# Patient Record
Sex: MALE | Race: WHITE | Employment: UNEMPLOYED | URBAN - METROPOLITAN AREA
[De-identification: names, ages, dates, MRNs, and addresses within clinical notes are randomized per-mention and may not be internally consistent; named-entity substitution may affect disease eponyms.]

---

## 2018-06-16 ENCOUNTER — APPOINTMENT (OUTPATIENT)
Dept: GENERAL RADIOLOGY | Age: 48
DRG: 055 | End: 2018-06-16
Payer: COMMERCIAL

## 2018-06-16 ENCOUNTER — APPOINTMENT (OUTPATIENT)
Dept: CT IMAGING | Age: 48
DRG: 055 | End: 2018-06-16
Payer: COMMERCIAL

## 2018-06-16 ENCOUNTER — HOSPITAL ENCOUNTER (INPATIENT)
Age: 48
LOS: 24 days | Discharge: HOME HEALTH | DRG: 055 | End: 2018-07-10
Attending: EMERGENCY MEDICINE | Admitting: SURGERY
Payer: COMMERCIAL

## 2018-06-16 DIAGNOSIS — T14.90XA TRAUMA: Primary | ICD-10-CM

## 2018-06-16 DIAGNOSIS — I60.9 SUBARACHNOID HEMORRHAGE (HCC): ICD-10-CM

## 2018-06-16 PROBLEM — S02.19XA CLOSED FRACTURE OF TEMPORAL BONE (HCC): Status: ACTIVE | Noted: 2018-06-16

## 2018-06-16 LAB
% INHIBITION AA: 0 %
% INHIBITION ADP: 9.1 %
ABO/RH: NORMAL
ACETAMINOPHEN LEVEL: <5 MCG/ML (ref 10–30)
ALBUMIN SERPL-MCNC: 3.9 G/DL (ref 3.5–5.2)
ALBUMIN SERPL-MCNC: 4.1 G/DL (ref 3.5–5.2)
ALP BLD-CCNC: 100 U/L (ref 40–129)
ALP BLD-CCNC: 107 U/L (ref 40–129)
ALT SERPL-CCNC: 26 U/L (ref 0–40)
ALT SERPL-CCNC: 28 U/L (ref 0–40)
AMPHETAMINE SCREEN, URINE: NOT DETECTED
ANGLE (CLOT STRENGTH): 72.8 DEGREE (ref 59–74)
ANION GAP SERPL CALCULATED.3IONS-SCNC: 13 MMOL/L (ref 7–16)
ANION GAP SERPL CALCULATED.3IONS-SCNC: 18 MMOL/L (ref 7–16)
ANTIBODY SCREEN: NORMAL
APTT: 27.2 SEC (ref 24.5–35.1)
AST SERPL-CCNC: 20 U/L (ref 0–39)
AST SERPL-CCNC: 23 U/L (ref 0–39)
B.E.: -1 MMOL/L (ref -3–3)
BARBITURATE SCREEN URINE: NOT DETECTED
BENZODIAZEPINE SCREEN, URINE: NOT DETECTED
BILIRUB SERPL-MCNC: 0.3 MG/DL (ref 0–1.2)
BILIRUB SERPL-MCNC: 0.4 MG/DL (ref 0–1.2)
BUN BLDV-MCNC: 24 MG/DL (ref 6–20)
BUN BLDV-MCNC: 25 MG/DL (ref 6–20)
CALCIUM IONIZED: 1.17 MMOL/L (ref 1.15–1.33)
CALCIUM SERPL-MCNC: 8.8 MG/DL (ref 8.6–10.2)
CALCIUM SERPL-MCNC: 8.9 MG/DL (ref 8.6–10.2)
CANNABINOID SCREEN URINE: POSITIVE
CHLORIDE BLD-SCNC: 93 MMOL/L (ref 98–107)
CHLORIDE BLD-SCNC: 95 MMOL/L (ref 98–107)
CO2: 25 MMOL/L (ref 22–29)
CO2: 28 MMOL/L (ref 22–29)
COCAINE METABOLITE SCREEN URINE: NOT DETECTED
COHB: 1.3 % (ref 0–1.5)
CREAT SERPL-MCNC: 1.2 MG/DL (ref 0.7–1.2)
CREAT SERPL-MCNC: 1.3 MG/DL (ref 0.7–1.2)
CRITICAL: ABNORMAL
DATE ANALYZED: ABNORMAL
DATE OF COLLECTION: ABNORMAL
ETHANOL: <10 MG/DL (ref 0–0.08)
G-TEG: 9.4 K D/SC (ref 4.5–11)
GFR AFRICAN AMERICAN: >60
GFR AFRICAN AMERICAN: >60
GFR NON-AFRICAN AMERICAN: 59 ML/MIN/1.73
GFR NON-AFRICAN AMERICAN: >60 ML/MIN/1.73
GLUCOSE BLD-MCNC: 254 MG/DL (ref 74–109)
GLUCOSE BLD-MCNC: 311 MG/DL (ref 74–109)
HCO3: 23.3 MMOL/L (ref 22–26)
HCT VFR BLD CALC: 41.3 % (ref 37–54)
HCT VFR BLD CALC: 42.4 % (ref 37–54)
HEMOGLOBIN: 14.1 G/DL (ref 12.5–16.5)
HEMOGLOBIN: 14.6 G/DL (ref 12.5–16.5)
HHB: 0.5 % (ref 0–5)
INR BLD: 1.1
K (CLOTTING TIME): 1.1 MIN (ref 1–3)
LAB: ABNORMAL
LACTIC ACID: 4.7 MMOL/L (ref 0.5–2.2)
Lab: 132
MA (MAX AMPLITUDE): 65.4 MM (ref 50–70)
MA-AA: 67.4 MM
MA-ACTIVATED: 14 MM
MA-ADP: 60.7 MM
MA-TEG BASELINE: 65.4 MM
MAGNESIUM: 1.2 MG/DL (ref 1.6–2.6)
MCH RBC QN AUTO: 27.6 PG (ref 26–35)
MCH RBC QN AUTO: 28.1 PG (ref 26–35)
MCHC RBC AUTO-ENTMCNC: 34.1 % (ref 32–34.5)
MCHC RBC AUTO-ENTMCNC: 34.4 % (ref 32–34.5)
MCV RBC AUTO: 80.8 FL (ref 80–99.9)
MCV RBC AUTO: 81.5 FL (ref 80–99.9)
METER GLUCOSE: 155 MG/DL (ref 70–110)
METER GLUCOSE: 224 MG/DL (ref 70–110)
METER GLUCOSE: 234 MG/DL (ref 70–110)
METER GLUCOSE: 85 MG/DL (ref 70–110)
METHADONE SCREEN, URINE: NOT DETECTED
METHB: 0.4 % (ref 0–1.5)
MODE: ABNORMAL
O2 CONTENT: 21.5 ML/DL
O2 SATURATION: 99.5 % (ref 92–98.5)
O2HB: 97.8 % (ref 94–97)
OPERATOR ID: ABNORMAL
OPIATE SCREEN URINE: NOT DETECTED
PATIENT TEMP: 37 C
PCO2: 38.1 MMHG (ref 35–45)
PDW BLD-RTO: 13.1 FL (ref 11.5–15)
PDW BLD-RTO: 13.2 FL (ref 11.5–15)
PH BLOOD GAS: 7.41 (ref 7.35–7.45)
PHENCYCLIDINE SCREEN URINE: NOT DETECTED
PHOSPHORUS: 2.8 MG/DL (ref 2.5–4.5)
PLATELET # BLD: 207 E9/L (ref 130–450)
PLATELET # BLD: 239 E9/L (ref 130–450)
PMV BLD AUTO: 9.5 FL (ref 7–12)
PMV BLD AUTO: 9.6 FL (ref 7–12)
PO2: 348.4 MMHG (ref 60–100)
POTASSIUM REFLEX MAGNESIUM: 3.6 MMOL/L (ref 3.5–5)
POTASSIUM SERPL-SCNC: 3.3 MMOL/L (ref 3.3–5.1)
POTASSIUM SERPL-SCNC: 3.6 MMOL/L (ref 3.5–5)
PROPOXYPHENE SCREEN: NOT DETECTED
PROTHROMBIN TIME: 12.6 SEC (ref 9.3–12.4)
R (REACTION TIME): 3.4 MIN (ref 5–10)
RBC # BLD: 5.11 E12/L (ref 3.8–5.8)
RBC # BLD: 5.2 E12/L (ref 3.8–5.8)
SALICYLATE, SERUM: <0.3 MG/DL (ref 0–30)
SODIUM BLD-SCNC: 136 MMOL/L (ref 132–146)
SODIUM BLD-SCNC: 136 MMOL/L (ref 132–146)
SOURCE, BLOOD GAS: ABNORMAL
THB: 15 G/DL (ref 11.5–16.5)
TIME ANALYZED: 135
TOTAL PROTEIN: 7.3 G/DL (ref 6.4–8.3)
TOTAL PROTEIN: 7.4 G/DL (ref 6.4–8.3)
TRICYCLIC ANTIDEPRESSANTS SCREEN SERUM: NEGATIVE NG/ML
WBC # BLD: 16 E9/L (ref 4.5–11.5)
WBC # BLD: 9.1 E9/L (ref 4.5–11.5)

## 2018-06-16 PROCEDURE — 87081 CULTURE SCREEN ONLY: CPT

## 2018-06-16 PROCEDURE — 2000000000 HC ICU R&B

## 2018-06-16 PROCEDURE — 94002 VENT MGMT INPAT INIT DAY: CPT

## 2018-06-16 PROCEDURE — 82962 GLUCOSE BLOOD TEST: CPT

## 2018-06-16 PROCEDURE — 2580000003 HC RX 258: Performed by: STUDENT IN AN ORGANIZED HEALTH CARE EDUCATION/TRAINING PROGRAM

## 2018-06-16 PROCEDURE — 99285 EMERGENCY DEPT VISIT HI MDM: CPT

## 2018-06-16 PROCEDURE — 83605 ASSAY OF LACTIC ACID: CPT

## 2018-06-16 PROCEDURE — 85384 FIBRINOGEN ACTIVITY: CPT

## 2018-06-16 PROCEDURE — 5A1955Z RESPIRATORY VENTILATION, GREATER THAN 96 CONSECUTIVE HOURS: ICD-10-PCS | Performed by: SURGERY

## 2018-06-16 PROCEDURE — 36415 COLL VENOUS BLD VENIPUNCTURE: CPT

## 2018-06-16 PROCEDURE — 71045 X-RAY EXAM CHEST 1 VIEW: CPT

## 2018-06-16 PROCEDURE — G0480 DRUG TEST DEF 1-7 CLASSES: HCPCS

## 2018-06-16 PROCEDURE — G0390 TRAUMA RESPONS W/HOSP CRITI: HCPCS

## 2018-06-16 PROCEDURE — 85610 PROTHROMBIN TIME: CPT

## 2018-06-16 PROCEDURE — 86900 BLOOD TYPING SEROLOGIC ABO: CPT

## 2018-06-16 PROCEDURE — 2500000003 HC RX 250 WO HCPCS: Performed by: STUDENT IN AN ORGANIZED HEALTH CARE EDUCATION/TRAINING PROGRAM

## 2018-06-16 PROCEDURE — 51702 INSERT TEMP BLADDER CATH: CPT

## 2018-06-16 PROCEDURE — 84100 ASSAY OF PHOSPHORUS: CPT

## 2018-06-16 PROCEDURE — 6360000002 HC RX W HCPCS

## 2018-06-16 PROCEDURE — 80307 DRUG TEST PRSMV CHEM ANLYZR: CPT

## 2018-06-16 PROCEDURE — 6370000000 HC RX 637 (ALT 250 FOR IP): Performed by: STUDENT IN AN ORGANIZED HEALTH CARE EDUCATION/TRAINING PROGRAM

## 2018-06-16 PROCEDURE — 85730 THROMBOPLASTIN TIME PARTIAL: CPT

## 2018-06-16 PROCEDURE — 70450 CT HEAD/BRAIN W/O DYE: CPT

## 2018-06-16 PROCEDURE — 82330 ASSAY OF CALCIUM: CPT

## 2018-06-16 PROCEDURE — 36556 INSERT NON-TUNNEL CV CATH: CPT | Performed by: NURSE PRACTITIONER

## 2018-06-16 PROCEDURE — 83735 ASSAY OF MAGNESIUM: CPT

## 2018-06-16 PROCEDURE — 72125 CT NECK SPINE W/O DYE: CPT

## 2018-06-16 PROCEDURE — 2500000003 HC RX 250 WO HCPCS

## 2018-06-16 PROCEDURE — C9113 INJ PANTOPRAZOLE SODIUM, VIA: HCPCS | Performed by: STUDENT IN AN ORGANIZED HEALTH CARE EDUCATION/TRAINING PROGRAM

## 2018-06-16 PROCEDURE — 86901 BLOOD TYPING SEROLOGIC RH(D): CPT

## 2018-06-16 PROCEDURE — 99291 CRITICAL CARE FIRST HOUR: CPT | Performed by: SURGERY

## 2018-06-16 PROCEDURE — 0BH17EZ INSERTION OF ENDOTRACHEAL AIRWAY INTO TRACHEA, VIA NATURAL OR ARTIFICIAL OPENING: ICD-10-PCS | Performed by: SURGERY

## 2018-06-16 PROCEDURE — 31500 INSERT EMERGENCY AIRWAY: CPT | Performed by: SURGERY

## 2018-06-16 PROCEDURE — 02HV33Z INSERTION OF INFUSION DEVICE INTO SUPERIOR VENA CAVA, PERCUTANEOUS APPROACH: ICD-10-PCS | Performed by: SURGERY

## 2018-06-16 PROCEDURE — 85027 COMPLETE CBC AUTOMATED: CPT

## 2018-06-16 PROCEDURE — 84132 ASSAY OF SERUM POTASSIUM: CPT

## 2018-06-16 PROCEDURE — 6360000002 HC RX W HCPCS: Performed by: STUDENT IN AN ORGANIZED HEALTH CARE EDUCATION/TRAINING PROGRAM

## 2018-06-16 PROCEDURE — 96374 THER/PROPH/DIAG INJ IV PUSH: CPT

## 2018-06-16 PROCEDURE — 82805 BLOOD GASES W/O2 SATURATION: CPT

## 2018-06-16 PROCEDURE — 85576 BLOOD PLATELET AGGREGATION: CPT

## 2018-06-16 PROCEDURE — 86850 RBC ANTIBODY SCREEN: CPT

## 2018-06-16 PROCEDURE — 72170 X-RAY EXAM OF PELVIS: CPT

## 2018-06-16 PROCEDURE — 80053 COMPREHEN METABOLIC PANEL: CPT

## 2018-06-16 PROCEDURE — 85347 COAGULATION TIME ACTIVATED: CPT

## 2018-06-16 RX ORDER — PANTOPRAZOLE SODIUM 40 MG/10ML
40 INJECTION, POWDER, LYOPHILIZED, FOR SOLUTION INTRAVENOUS DAILY
Status: DISCONTINUED | OUTPATIENT
Start: 2018-06-16 | End: 2018-06-17

## 2018-06-16 RX ORDER — POTASSIUM CHLORIDE 20 MEQ/1
40 TABLET, EXTENDED RELEASE ORAL ONCE
Status: DISCONTINUED | OUTPATIENT
Start: 2018-06-16 | End: 2018-06-16

## 2018-06-16 RX ORDER — SODIUM CHLORIDE 0.9 % (FLUSH) 0.9 %
10 SYRINGE (ML) INJECTION PRN
Status: DISCONTINUED | OUTPATIENT
Start: 2018-06-16 | End: 2018-06-23 | Stop reason: SDUPTHER

## 2018-06-16 RX ORDER — PROMETHAZINE HYDROCHLORIDE 25 MG/ML
6.25 INJECTION, SOLUTION INTRAMUSCULAR; INTRAVENOUS EVERY 6 HOURS PRN
Status: DISCONTINUED | OUTPATIENT
Start: 2018-06-16 | End: 2018-07-10 | Stop reason: HOSPADM

## 2018-06-16 RX ORDER — LEVETIRACETAM 5 MG/ML
500 INJECTION INTRAVASCULAR EVERY 12 HOURS
Status: DISCONTINUED | OUTPATIENT
Start: 2018-06-16 | End: 2018-06-17

## 2018-06-16 RX ORDER — MINERAL OIL AND WHITE PETROLATUM 150; 830 MG/G; MG/G
OINTMENT OPHTHALMIC
Status: DISCONTINUED | OUTPATIENT
Start: 2018-06-16 | End: 2018-07-06

## 2018-06-16 RX ORDER — NICOTINE POLACRILEX 4 MG
15 LOZENGE BUCCAL PRN
Status: DISCONTINUED | OUTPATIENT
Start: 2018-06-16 | End: 2018-07-10 | Stop reason: HOSPADM

## 2018-06-16 RX ORDER — ONDANSETRON 2 MG/ML
4 INJECTION INTRAMUSCULAR; INTRAVENOUS EVERY 6 HOURS PRN
Status: DISCONTINUED | OUTPATIENT
Start: 2018-06-16 | End: 2018-07-10 | Stop reason: HOSPADM

## 2018-06-16 RX ORDER — OXYCODONE HCL 5 MG/5 ML
5 SOLUTION, ORAL ORAL EVERY 4 HOURS
Status: DISCONTINUED | OUTPATIENT
Start: 2018-06-16 | End: 2018-06-17

## 2018-06-16 RX ORDER — LISINOPRIL 2.5 MG/1
2.5 TABLET ORAL DAILY
COMMUNITY

## 2018-06-16 RX ORDER — ACETAMINOPHEN 325 MG/1
650 TABLET ORAL ONCE
Status: DISCONTINUED | OUTPATIENT
Start: 2018-06-16 | End: 2018-06-16

## 2018-06-16 RX ORDER — PROMETHAZINE HYDROCHLORIDE 25 MG/ML
INJECTION, SOLUTION INTRAMUSCULAR; INTRAVENOUS
Status: COMPLETED
Start: 2018-06-16 | End: 2018-06-16

## 2018-06-16 RX ORDER — HALOPERIDOL 5 MG/ML
INJECTION INTRAMUSCULAR
Status: COMPLETED
Start: 2018-06-16 | End: 2018-06-16

## 2018-06-16 RX ORDER — ACETAMINOPHEN 325 MG/1
650 TABLET ORAL EVERY 4 HOURS PRN
Status: DISCONTINUED | OUTPATIENT
Start: 2018-06-16 | End: 2018-07-05

## 2018-06-16 RX ORDER — VECURONIUM BROMIDE 1 MG/ML
INJECTION, POWDER, LYOPHILIZED, FOR SOLUTION INTRAVENOUS
Status: DISCONTINUED
Start: 2018-06-16 | End: 2018-06-16 | Stop reason: WASHOUT

## 2018-06-16 RX ORDER — THIAMINE HYDROCHLORIDE 100 MG/ML
100 INJECTION, SOLUTION INTRAMUSCULAR; INTRAVENOUS DAILY
Status: DISCONTINUED | OUTPATIENT
Start: 2018-06-16 | End: 2018-06-17

## 2018-06-16 RX ORDER — BISACODYL 10 MG
10 SUPPOSITORY, RECTAL RECTAL DAILY PRN
Status: DISCONTINUED | OUTPATIENT
Start: 2018-06-16 | End: 2018-07-10 | Stop reason: HOSPADM

## 2018-06-16 RX ORDER — 0.9 % SODIUM CHLORIDE 0.9 %
10 VIAL (ML) INJECTION DAILY
Status: DISCONTINUED | OUTPATIENT
Start: 2018-06-16 | End: 2018-06-17

## 2018-06-16 RX ORDER — SODIUM CHLORIDE 0.9 % (FLUSH) 0.9 %
10 SYRINGE (ML) INJECTION PRN
Status: DISCONTINUED | OUTPATIENT
Start: 2018-06-16 | End: 2018-06-16 | Stop reason: SDUPTHER

## 2018-06-16 RX ORDER — 0.9 % SODIUM CHLORIDE 0.9 %
1000 INTRAVENOUS SOLUTION INTRAVENOUS ONCE
Status: COMPLETED | OUTPATIENT
Start: 2018-06-16 | End: 2018-06-16

## 2018-06-16 RX ORDER — POTASSIUM CHLORIDE 7.45 MG/ML
10 INJECTION INTRAVENOUS
Status: COMPLETED | OUTPATIENT
Start: 2018-06-16 | End: 2018-06-16

## 2018-06-16 RX ORDER — PROPOFOL 10 MG/ML
10 INJECTION, EMULSION INTRAVENOUS
Status: DISCONTINUED | OUTPATIENT
Start: 2018-06-16 | End: 2018-06-21

## 2018-06-16 RX ORDER — HALOPERIDOL 5 MG/ML
5 INJECTION INTRAMUSCULAR EVERY 6 HOURS PRN
Status: DISCONTINUED | OUTPATIENT
Start: 2018-06-16 | End: 2018-06-17

## 2018-06-16 RX ORDER — SODIUM CHLORIDE 0.9 % (FLUSH) 0.9 %
10 SYRINGE (ML) INJECTION EVERY 12 HOURS SCHEDULED
Status: DISCONTINUED | OUTPATIENT
Start: 2018-06-16 | End: 2018-06-16 | Stop reason: SDUPTHER

## 2018-06-16 RX ORDER — SODIUM CHLORIDE 9 MG/ML
INJECTION, SOLUTION INTRAVENOUS CONTINUOUS
Status: DISCONTINUED | OUTPATIENT
Start: 2018-06-16 | End: 2018-06-16

## 2018-06-16 RX ORDER — FOLIC ACID 5 MG/ML
1 INJECTION, SOLUTION INTRAMUSCULAR; INTRAVENOUS; SUBCUTANEOUS DAILY
Status: DISCONTINUED | OUTPATIENT
Start: 2018-06-16 | End: 2018-06-17

## 2018-06-16 RX ORDER — SUCCINYLCHOLINE CHLORIDE 20 MG/ML
3 INJECTION INTRAMUSCULAR; INTRAVENOUS ONCE
Status: COMPLETED | OUTPATIENT
Start: 2018-06-16 | End: 2018-06-16

## 2018-06-16 RX ORDER — ONDANSETRON 2 MG/ML
4 INJECTION INTRAMUSCULAR; INTRAVENOUS EVERY 6 HOURS PRN
Status: DISCONTINUED | OUTPATIENT
Start: 2018-06-16 | End: 2018-06-16

## 2018-06-16 RX ORDER — PROPOFOL 10 MG/ML
INJECTION, EMULSION INTRAVENOUS
Status: COMPLETED
Start: 2018-06-16 | End: 2018-06-16

## 2018-06-16 RX ORDER — CHLORHEXIDINE GLUCONATE 0.12 MG/ML
15 RINSE ORAL 2 TIMES DAILY
Status: DISCONTINUED | OUTPATIENT
Start: 2018-06-16 | End: 2018-06-26

## 2018-06-16 RX ORDER — ETOMIDATE 2 MG/ML
INJECTION INTRAVENOUS
Status: COMPLETED
Start: 2018-06-16 | End: 2018-06-16

## 2018-06-16 RX ORDER — HALOPERIDOL 5 MG/ML
5 INJECTION INTRAMUSCULAR ONCE
Status: COMPLETED | OUTPATIENT
Start: 2018-06-16 | End: 2018-06-16

## 2018-06-16 RX ORDER — SODIUM CHLORIDE 0.9 % (FLUSH) 0.9 %
10 SYRINGE (ML) INJECTION EVERY 12 HOURS SCHEDULED
Status: DISCONTINUED | OUTPATIENT
Start: 2018-06-16 | End: 2018-07-10 | Stop reason: HOSPADM

## 2018-06-16 RX ORDER — SODIUM CHLORIDE 9 MG/ML
INJECTION, SOLUTION INTRAVENOUS CONTINUOUS
Status: DISCONTINUED | OUTPATIENT
Start: 2018-06-16 | End: 2018-06-17

## 2018-06-16 RX ORDER — LORAZEPAM 2 MG/ML
1 INJECTION INTRAMUSCULAR
Status: COMPLETED | OUTPATIENT
Start: 2018-06-16 | End: 2018-06-16

## 2018-06-16 RX ORDER — ETOMIDATE 2 MG/ML
0.3 INJECTION INTRAVENOUS ONCE
Status: COMPLETED | OUTPATIENT
Start: 2018-06-16 | End: 2018-06-16

## 2018-06-16 RX ORDER — DEXTROSE MONOHYDRATE 25 G/50ML
12.5 INJECTION, SOLUTION INTRAVENOUS PRN
Status: DISCONTINUED | OUTPATIENT
Start: 2018-06-16 | End: 2018-07-10 | Stop reason: HOSPADM

## 2018-06-16 RX ORDER — MAGNESIUM SULFATE IN WATER 40 MG/ML
4 INJECTION, SOLUTION INTRAVENOUS ONCE
Status: COMPLETED | OUTPATIENT
Start: 2018-06-16 | End: 2018-06-16

## 2018-06-16 RX ORDER — SUCCINYLCHOLINE CHLORIDE 20 MG/ML
INJECTION INTRAMUSCULAR; INTRAVENOUS
Status: COMPLETED
Start: 2018-06-16 | End: 2018-06-16

## 2018-06-16 RX ORDER — DEXTROSE MONOHYDRATE 50 MG/ML
100 INJECTION, SOLUTION INTRAVENOUS PRN
Status: DISCONTINUED | OUTPATIENT
Start: 2018-06-16 | End: 2018-07-10 | Stop reason: HOSPADM

## 2018-06-16 RX ADMIN — PHENOBARBITAL SODIUM 842.4 MG: 130 INJECTION INTRAMUSCULAR; INTRAVENOUS at 09:28

## 2018-06-16 RX ADMIN — Medication 10 ML: at 08:16

## 2018-06-16 RX ADMIN — SODIUM CHLORIDE 100 ML/HR: 9 INJECTION, SOLUTION INTRAVENOUS at 01:57

## 2018-06-16 RX ADMIN — ETOMIDATE 25.2 MG: 20 INJECTION, SOLUTION INTRAVENOUS at 15:06

## 2018-06-16 RX ADMIN — POTASSIUM CHLORIDE 10 MEQ: 7.46 INJECTION, SOLUTION INTRAVENOUS at 08:18

## 2018-06-16 RX ADMIN — PROPOFOL 40 MCG/KG/MIN: 10 INJECTION, EMULSION INTRAVENOUS at 18:18

## 2018-06-16 RX ADMIN — OXYCODONE HYDROCHLORIDE 5 MG: 5 SOLUTION ORAL at 19:34

## 2018-06-16 RX ADMIN — ETOMIDATE 25.2 MG: 2 INJECTION INTRAVENOUS at 15:06

## 2018-06-16 RX ADMIN — INSULIN LISPRO 6 UNITS: 100 INJECTION, SOLUTION INTRAVENOUS; SUBCUTANEOUS at 08:10

## 2018-06-16 RX ADMIN — CHLORHEXIDINE GLUCONATE 15 ML: 1.2 RINSE ORAL at 20:47

## 2018-06-16 RX ADMIN — HALOPERIDOL LACTATE 5 MG: 5 INJECTION INTRAMUSCULAR at 15:06

## 2018-06-16 RX ADMIN — ONDANSETRON 4 MG: 2 INJECTION, SOLUTION INTRAMUSCULAR; INTRAVENOUS at 01:48

## 2018-06-16 RX ADMIN — THIAMINE HYDROCHLORIDE 100 MG: 100 INJECTION, SOLUTION INTRAMUSCULAR; INTRAVENOUS at 14:18

## 2018-06-16 RX ADMIN — HALOPERIDOL LACTATE 5 MG: 5 INJECTION INTRAMUSCULAR at 09:15

## 2018-06-16 RX ADMIN — INSULIN LISPRO 6 UNITS: 100 INJECTION, SOLUTION INTRAVENOUS; SUBCUTANEOUS at 12:04

## 2018-06-16 RX ADMIN — HALOPERIDOL 5 MG: 5 INJECTION INTRAMUSCULAR at 09:15

## 2018-06-16 RX ADMIN — POTASSIUM CHLORIDE 10 MEQ: 7.46 INJECTION, SOLUTION INTRAVENOUS at 09:00

## 2018-06-16 RX ADMIN — LEVETIRACETAM 500 MG: 5 INJECTION INTRAVENOUS at 03:40

## 2018-06-16 RX ADMIN — SUCCINYLCHOLINE CHLORIDE 252 MG: 20 INJECTION INTRAMUSCULAR; INTRAVENOUS at 15:07

## 2018-06-16 RX ADMIN — PROPOFOL 25 MCG/KG/MIN: 10 INJECTION, EMULSION INTRAVENOUS at 15:00

## 2018-06-16 RX ADMIN — FOLIC ACID 1 MG: 5 INJECTION, SOLUTION INTRAMUSCULAR; INTRAVENOUS; SUBCUTANEOUS at 11:40

## 2018-06-16 RX ADMIN — HALOPERIDOL LACTATE 5 MG: 5 INJECTION INTRAMUSCULAR at 09:20

## 2018-06-16 RX ADMIN — Medication 10 ML: at 20:46

## 2018-06-16 RX ADMIN — MAGNESIUM SULFATE IN WATER 4 G: 40 INJECTION, SOLUTION INTRAVENOUS at 08:21

## 2018-06-16 RX ADMIN — SODIUM CHLORIDE: 9 INJECTION, SOLUTION INTRAVENOUS at 17:17

## 2018-06-16 RX ADMIN — POTASSIUM PHOSPHATE, MONOBASIC AND POTASSIUM PHOSPHATE, DIBASIC 15 MMOL: 224; 236 INJECTION, SOLUTION INTRAVENOUS at 08:21

## 2018-06-16 RX ADMIN — PANTOPRAZOLE SODIUM 40 MG: 40 INJECTION, POWDER, FOR SOLUTION INTRAVENOUS at 08:16

## 2018-06-16 RX ADMIN — PROMETHAZINE HYDROCHLORIDE 6.25 MG: 25 INJECTION INTRAMUSCULAR; INTRAVENOUS at 03:53

## 2018-06-16 RX ADMIN — PROPOFOL 30 MCG/KG/MIN: 10 INJECTION, EMULSION INTRAVENOUS at 15:00

## 2018-06-16 RX ADMIN — LEVETIRACETAM 500 MG: 5 INJECTION INTRAVENOUS at 17:05

## 2018-06-16 RX ADMIN — LORAZEPAM 1 MG: 2 INJECTION INTRAMUSCULAR; INTRAVENOUS at 15:06

## 2018-06-16 RX ADMIN — SODIUM CHLORIDE: 9 INJECTION, SOLUTION INTRAVENOUS at 04:08

## 2018-06-16 RX ADMIN — PROPOFOL 35 MCG/KG/MIN: 10 INJECTION, EMULSION INTRAVENOUS at 23:03

## 2018-06-16 RX ADMIN — SODIUM CHLORIDE 1000 ML: 9 INJECTION, SOLUTION INTRAVENOUS at 03:36

## 2018-06-16 RX ADMIN — INSULIN LISPRO 3 UNITS: 100 INJECTION, SOLUTION INTRAVENOUS; SUBCUTANEOUS at 17:30

## 2018-06-16 RX ADMIN — SUCCINYLCHOLINE CHLORIDE 252 MG: 20 INJECTION, SOLUTION INTRAMUSCULAR; INTRAVENOUS; PARENTERAL at 15:07

## 2018-06-16 RX ADMIN — POTASSIUM CHLORIDE 10 MEQ: 7.46 INJECTION, SOLUTION INTRAVENOUS at 11:40

## 2018-06-16 RX ADMIN — POTASSIUM CHLORIDE 10 MEQ: 7.46 INJECTION, SOLUTION INTRAVENOUS at 10:39

## 2018-06-16 RX ADMIN — PROMETHAZINE HYDROCHLORIDE 6.25 MG: 25 INJECTION INTRAMUSCULAR; INTRAVENOUS at 03:48

## 2018-06-16 ASSESSMENT — PULMONARY FUNCTION TESTS
PIF_VALUE: 40
PIF_VALUE: 17
PIF_VALUE: 30

## 2018-06-16 NOTE — H&P
TRAUMA HISTORY & PHYSICAL  Advanced Practice Nurse/Resident  6/16/2018  1:48 AM    PRIMARY SURVEY    CHIEF COMPLAINT:  52yo male fall backwards from standing height at a party. + ETOH.  Vomiting blood en route    AIRWAY:   Airway normal  EMS ETT Absent   Noisy respirations Absent   Retractions: Absent   Vomiting/bleeding: Present    BREATHING:    Midaxillary breath sound left:  Present  Midaxillary breath sound right: Present  Cough sound intensity:  Good  Respiratory rate: 17  FiO2: 15 liters/min via non-rebreather face mask  SpO2: 100%    SMI: unreliable    CIRCULATION:   Femoral pulse rate: within normal limits  Femoral pulse intensity: present  Palpebral conjunctiva: Pink      BP      P     SpO2       T      F    Patient Vitals for the past 8 hrs:   BP Temp Pulse Resp SpO2 Height Weight   06/16/18 0139 - 96 °F (35.6 °C) - - - - -   06/16/18 0138 127/81 - 90 16 99 % - -   06/16/18 0136 (!) 147/92 - 96 22 97 % - -   06/16/18 0135 - - 93 17 - - -   06/16/18 0130 - - - - - 6' 2\" (1.88 m) 190 lb (86.2 kg)   06/16/18 0129 - - 90 17 100 % - -   06/16/18 0127 122/82 - - - - - -       FAST EXAM: deferred    Central Nervous System    GCS Initial 15 minutes   Eye  Motor  Verbal 4 - Opens eyes on own  5 - Pushes away noxious stimulus  4 - Seems confused, disoriented 4 - Opens eyes on own  5 - Pushes away noxious stimulus  4 - Seems confused, disoriented     Neuromuscular blockade: No  Pupil size:  Left 4 mm    Right 4 mm  Pupil reaction: Yes  Wiggles fingers: Left Yes, not on command Right Yes and not on command  Wiggles toes: Left Yes    Right Yes    Hand grasp:   Left normal       Right normal  Plantar flexion: Left normal     Right normal  Loss of consciousness: unknown    History Obtained From:  EMS  Private Medical Doctor: unknown    Pre-exisiting Medical History:  unknown    Conditions: unknown    Medications: unknown    Allergies: unknown    Social History: unknown    Past Surgical History:  unknown    NSAID use in last 72 hours: unknown  Taken PCN in past:  unknown  Last food/drink: unknown  Last tetanus: unknown    Family History: Noncontributory     Complaints: nausea, vomiting    SECONDARY SURVEY    Head/scalp: posterior cephalohematoma, no laceration noted    Face: Atraumatic    Eyes/ears/nose: blood occluding right tympanic membrane. Cerumen left ear canal    Pharynx/mouth: Atraumatic    Neck: Atraumatic   Cervical spine tenderness: none  ROM:  collar    Chest wall:  Atraumatic    Heart:  Atraumatic, RRR    Abdomen: Atraumatic, Soft,  nondistended  Tenderness:  none    Pelvis: Atraumatic  Tenderness: none    Thoracolumbar spine: Atraumatic  Tenderness:  None apparent    Genitourinary:  Atraumatic. No blood or urine noted    Rectum: Atraumatic. No blood noted. Perineum: Atraumatic. No blood or urine noted.       Extremities:   Sensory normal  Motor normal    Distal Pulses  Left arm Normal   Right arm Normal  Left leg Normal  Right leg Normal    Capillary refill  Left arm normal  Right arm normal  Left leg normal   Right leg normal    Procedures in ED:      Lacerations sutured by: none  Description of repair: none    Radiology: CXR, PXR, CT head, c spine    Consultations:  Neurosurgery    Admission/Diagnosis: 52yo male fall while intoxicated with left intracranial hemorrhage, blood at left ear canal    CODE Status: full    Plan of Treatment:  Admit to SICU  Await final CT reads  Await lab results  IVF  Pain management    Plan discussed with Dr. Estefani Willis    Workup pending: tertiary survey    Jeramie Britton MD on 6/16/2018 at 1:48 AM

## 2018-06-16 NOTE — CONSULTS
[address]                   [address] [id number],  [facility]                                   CONSULTATION    PATIENT NAME: Haja Hill                          :        1970  MED REC NO:   [id number]                            ROOM:       2451  ACCOUNT NO:   [de-identified]                           ADMIT DATE: 2018  PROVIDER:     Magali Naranjo MD    CONSULT DATE:  2018    CHIEF COMPLAINT:  History of fall. HISTORY OF PRESENT ILLNESS:  This 67-year-old male fell backwards from  standing height at a party. Ethanol on board. Vomiting blood en route. The patient underwent CT scan of the brain which was reported to show  stable left temporal subarachnoid hemorrhage and mild hemorrhagic contusion  of the inferior right frontal lobe with mild associated edema and stable  nondisplaced left mastoid temporal bone fracture. CT scan of the cervical  spine was reported to show no acute finding of fractures. PAST MEDICAL HISTORY:  Diabetes mellitus. PAST SURGICAL HISTORY:  No surgical history available. ALLERGIES:  No known allergies. SOCIAL HISTORY:  He is a current smoker. Does use alcohol. PHYSICAL EXAMINATION:  On examination, he is a well developed,  well-nourished male who is intubated and sedated at this time. Pupillary  ciliospinal reflex present. At this time he is not responding to stimuli. He apparently was moving all extremities well spontaneously and extremities  also to command prior to intubation and sedation. DIAGNOSTIC DATA:  I have reviewed the CT scan. IMPRESSION:  Cerebral contusion with subarachnoid hemorrhage, left  frontotemporal lobe. Blood in the left ear canal, possibly basal skull  fracture of temporal bone. RECOMMENDATIONS:  Keppra 500 b.i.d. The repeat scan is relatively stable. We will follow along at this time.   There is no indication for any  neurosurgical

## 2018-06-16 NOTE — PROGRESS NOTES
Trauma Tertiary Survey    Admit Date: 6/16/2018  Hospital day 0    Other fall from standing     CC:  [facility]        Past Medical History:   Diagnosis Date    Diabetes mellitus ([facility] [address]        Alcohol pre-screening:  Men: Unable to assess      Scheduled Meds:   sodium chloride flush  10 mL Intravenous 2 times per day    pantoprazole  40 mg Intravenous Daily    And    sodium chloride (PF)  10 mL Intravenous Daily    levetiracetam  500 mg Intravenous Q12H    insulin lispro  0-18 Units Subcutaneous TID WC    insulin lispro  0-9 Units Subcutaneous Nightly    thiamine  100 mg Intravenous Daily    folic acid  1 mg Intravenous Daily     Continuous Infusions:   sodium chloride 100 mL/hr at 06/16/18 0408    dextrose       PRN Meds:sodium chloride flush, acetaminophen, ondansetron, glucose, dextrose, glucagon (rDNA), dextrose, promethazine, haloperidol lactate, LORazepam    Subjective:     Patient is sedated due to acute agitation and risk of self harm    Objective:   Patient Vitals for the past 8 hrs:   BP Temp Temp src Pulse Resp SpO2   06/16/18 1200 136/84 99.9 °F (37.7 °C) CORE 105 24 100 %   06/16/18 1000 135/86 100.1 °F (37.8 °C) CORE 102 21 97 %   06/16/18 0900 (!) 146/89 - - 96 22 -   06/16/18 0800 (!) 147/86 100.1 °F (37.8 °C) CORE 90 20 92 %   06/16/18 0700 (!) 150/96 - - 89 20 96 %   06/16/18 0600 (!) 150/98 99.1 °F (37.3 °C) CORE 93 22 95 %       I/O last 3 completed shifts: In: 2039 [I.V.:1039; IV Piggyback:1000]  Out: 5598 [BZXRY:1681]  I/O this shift: In: 65 [IV Piggyback:850]  Out: 46 [Urine:347]    Past Medical History:   Diagnosis Date    Diabetes mellitus ([facility] [address]        Radiology:  XR PELVIS (1-2 VIEWS)   Final Result   Limited study, no gross abnormality identified      XR CHEST PORTABLE   Final Result   Nonacute chest.            CT Head WO Contrast   Final Result     Stable left temporal subarachnoid hemorrhage.         Suspect mild hemorrhagic contusion of the inferior right frontal lobe with extra-axial region, possibly secondary to    fracture. THIS REPORT CONTAINS FINDINGS THAT MAY BE CRITICAL TO PATIENT CARE. The    findings were verbally communicated via telephone conference with Dr. Walter Calabrese at    2:41 AM EDT on 6/16/2018. The findings were acknowledged and understood. This addendum has been electronically signed by Tara Benavides MD.      Final      1. Small amount of subarachnoid hemorrhage within the left temporal sulci. 2.  Mild left posterior soft tissue swelling/contusion. 3.  Diffuse paranasal sinusitis. This report has been electronically signed by Tara Benavides MD.      CT Cervical Spine WO Contrast   Final Result      1. No acute findings. 2. Non-acute findings are described above.       This report has been electronically signed by Tara Benavides MD.      CT Head WO Contrast    (Results Pending)       PHYSICAL EXAM:   GCS:  2 - Opens eyes with pain   5 - Pushes away noxious stimulus  4 - Seems confused, disoriented    Pupil size:  Left 3 mm Right 3 mm  Pupil reaction: Yes  Wiggles fingers: Left unable to assess Right unable to assess  Hand grasp:   Left unable to assess    Right unable to assess  Wiggles toes: Left unable to assess    Right unable to assess  Plantar flexion: Left unable to assess  Right unable to assess     CONSTITUTIONAL: NAD  NEURO: GCS 11, E2, V4, M5,   HEENT: PERRL  LUNGS: CTA  CARDIOVASCULAR: RRR, no mumur  ABDOMEN: soft, non distended, no gaurding  MUSCULOSKELETAL: MAEx4      Spine:     Spine Tenderness ROM   Cervical 0 /10 c-collar in place, unable to assess   Thoracic 0 /10 Unable to assess   Lumbar 0 /10 Unable to assess     Musculoskeletal    Joint Tenderness Swelling ROM   Right shoulder absent absent Unable to assess   Left shoulder absent absent Unable to assess   Right elbow absent absent Unable to assess   Left elbow absent absent Unable to assess   Right wrist absent absent Unable to assess   Left wrist absent absent Unable to Citlaly Langley, DO PGY3 on 6/16/18 at 1:14 PM

## 2018-06-16 NOTE — FLOWSHEET NOTE
Restraints released for repositioning and reassessment of restraints. Patient continues to grab IV sites in both arms. Also continues to take aspen collar off and remove EKG leads, blood pressure cuff, and pulse ox. All of which are vital to patient care at this point. Poor safety awareness. Unable to redirect at this time. Bilateral soft wrist restraints continued for patient safety.

## 2018-06-16 NOTE — FLOWSHEET NOTE
Restraints released for repositioning and reassessment of restraints. Patient is extremely agitated and combative. Continues to grab ETT/ng. Poor safety awareness. Unable to redirect at this time. Bilateral soft wrist restraints continued for patient safety.

## 2018-06-16 NOTE — ED PROVIDER NOTES
Department of Emergency Medicine   ED  Provider Note  Admit Date/RoomTime: 6/16/2018  1:22 AM  ED Room: 3807/3807-A                HPI:  6/16/18, Time: 1:40 AM  .       Michaeleen Kanner is a 56 y.o. male presenting to the ED as a trauma fall, beginning just prior to arrival .  The complaint has been constant, moderate in severity, and worsened by nothing. Patient was reportedly drinking alcohol at a party and fell backwards per EMS. GCS was initially 5 per EMS. He had multiple episodes of brown emesis prior to arrival. Unable to obtain a complete HPI due to this patient's due to condition and mental status. Please note, this patient arrived as a Trauma Alert and the trauma service assumed the care of this patient on their arrival    Initial evaluation occurred with trauma services at bedside. This patients disposition will be determined by trauma services. Glascow Coma Scale at time of initial examination  Best Eye Response 4 - Opens eyes on own   Best Verbal Response 3 - Talks, but nonsensical   Best Motor Response 6 - Follows simple motor commands   Total 13         Review of Systems:   Unable to obtain a complete ROS due to this patient's due to condition and intoxication.    --------------------------------------------- PAST HISTORY ---------------------------------------------  Past Medical History:  has a past medical history of Diabetes mellitus ([facility] [address] Past Surgical History:  has no past surgical history on file. Social History:  reports that he has been smoking. He does not have any smokeless tobacco history on file. He reports that he drinks alcohol. Family History: family history is not on file. The patients home medications have been reviewed. Allergies: Patient has no known allergies. ------------------------- NURSING NOTES AND VITALS REVIEWED ---------------------------   The nursing notes within the ED encounter and vital signs as below have been reviewed.    /77 Range    CULTURE, RESPIRATORY Oral Pharyngeal Rhonda absent (A)     Smear, Respiratory       Group 6: <25 PMN's/LPF and <25 Epithelial cells/LPF  Few Polymorphonuclear leukocytes  Rare Epithelial cells  Rare gram positive rods Diphtheroid-like  Rare Gram positive diplococci      Organism Staphylococcus aureus (A)     CULTURE, RESPIRATORY Light growth        Susceptibility    Staphylococcus aureus - BACTERIAL SUSCEPTIBILITY PANEL BY KRISTINE     clindamycin =^0.25 Sensitive mcg/mL     doxycycline <=^0.5 Sensitive mcg/mL     erythromycin >=^8 Resistant mcg/mL     gentamicin <=^0.5 Sensitive mcg/mL     oxacillin =^0.5 Sensitive mcg/mL     tigecycline <=^0.12 Sensitive mcg/mL     trimethoprim-sulfamethoxazole <=^10 Sensitive mcg/mL     vancomycin <=^0.5 Sensitive mcg/mL   Blood Gas, Arterial   Result Value Ref Range    Date Analyzed [id number]     Time Analyzed 0135     Source: Blood Arterial     pH, Blood Gas 7.405 7.350 - 7.450    PCO2 38.1 35.0 - 45.0 mmHg    PO2 348.4 (H) 60.0 - 100.0 mmHg    HCO3 23.3 22.0 - 26.0 mmol/L    B.E. -1.0 -3.0 - 3.0 mmol/L    O2 Sat 99.5 (H) 92.0 - 98.5 %    O2Hb 97.8 (H) 94.0 - 97.0 %    COHb 1.3 0.0 - 1.5 %    MetHb 0.4 0.0 - 1.5 %    O2 Content 21.5 mL/dL    HHb 0.5 0.0 - 5.0 %    tHb (est) 15.0 11.5 - 16.5 g/dL    Potassium 3.30 3.30 - 5.10 mmol/L    Mode NRB 15L     Date Of Collection [id number]     Time Collected 0132     Pt Temp 37.0 C     ID [id number]     Lab [id number]     Critical(s) Notified .  No Critical Values    Comprehensive Metabolic Panel   Result Value Ref Range    Sodium 136 132 - 146 mmol/L    Potassium 3.6 3.5 - 5.0 mmol/L    Chloride 93 (L) 98 - 107 mmol/L    CO2 25 22 - 29 mmol/L    Anion Gap 18 (H) 7 - 16 mmol/L    Glucose 311 (H) 74 - 109 mg/dL    BUN 25 (H) 6 - 20 mg/dL    CREATININE 1.3 (H) 0.7 - 1.2 mg/dL    GFR Non-African American 59 >=60 mL/min/1.73    GFR African American >60     Calcium 8.8 8.6 - 10.2 mg/dL    Total Protein 7.3 6.4 - 8.3 g/dL    Alb 3.9 3.5 - 5.2 American >60     Calcium 8.9 8.6 - 10.2 mg/dL    Total Protein 7.4 6.4 - 8.3 g/dL    Alb 4.1 3.5 - 5.2 g/dL    Total Bilirubin 0.4 0.0 - 1.2 mg/dL    Alkaline Phosphatase 107 40 - 129 U/L    ALT 26 0 - 40 U/L    AST 20 0 - 39 U/L   CBC   Result Value Ref Range    WBC 16.0 (H) 4.5 - 11.5 E9/L    RBC 5.20 3.80 - 5.80 E12/L    Hemoglobin 14.6 12.5 - 16.5 g/dL    Hematocrit 42.4 37.0 - 54.0 %    MCV 81.5 80.0 - 99.9 fL    MCH 28.1 26.0 - 35.0 pg    MCHC 34.4 32.0 - 34.5 %    RDW 13.2 11.5 - 15.0 fL    Platelets 040 901 - 879 E9/L    MPV 9.6 7.0 - 12.0 fL   URINE DRUG SCREEN   Result Value Ref Range    Amphetamine Screen, Urine NOT DETECTED Negative <1000 ng/mL    Barbiturate Screen, Ur NOT DETECTED Negative < 200 ng/mL    Benzodiazepine Screen, Urine NOT DETECTED Negative < 200 ng/mL    Cannabinoid Scrn, Ur POSITIVE (A) Negative < 50ng/mL    COCAINE METABOLITE SCREEN URINE NOT DETECTED Negative < 300 ng/mL    Opiate Scrn, Ur NOT DETECTED Negative < 300ng/mL    PCP Scrn, Ur NOT DETECTED Negative < 25 ng/mL    Methadone Screen, Urine NOT DETECTED Negative <300 ng/mL    Propoxyphene Scrn, Ur NOT DETECTED Negative <300 ng/mL   Magnesium   Result Value Ref Range    Magnesium 1.6 1.6 - 2.6 mg/dL   Phosphorus   Result Value Ref Range    Phosphorus 1.6 (L) 2.5 - 4.5 mg/dL   Calcium, Ionized   Result Value Ref Range    Calcium, Ion 1.21 1.15 - 1.33 mmol/L   Blood gases   Result Value Ref Range    Date Analyzed [id number]     Time Analyzed 0556     Source: Blood Arterial     pH, Blood Gas 7.556 (H) 7.350 - 7.450    PCO2 28.6 (L) 35.0 - 45.0 mmHg    PO2 133.2 (H) 60.0 - 100.0 mmHg    HCO3 24.8 22.0 - 26.0 mmol/L    B.E. 3.3 (H) -3.0 - 3.0 mmol/L    O2 Sat 99.2 (H) 92.0 - 98.5 %    PO2/FIO2 2.66 mmHg/%    AaDO2 179.6 mmHg    RI(T) 135 %    O2Hb 97.8 (H) 94.0 - 97.0 %    COHb 1.2 0.0 - 1.5 %    MetHb 0.2 0.0 - 1.5 %    HHb 0.8 0.0 - 5.0 %    tHb (est) 12.8 11.5 - 16.5 g/dL    Mode AC     FIO2 50.0 %    Rr Mechanical 14.0 b/min    Vt - 10.2 mg/dL    Total Protein 6.2 (L) 6.4 - 8.3 g/dL    Alb 3.0 (L) 3.5 - 5.2 g/dL    Total Bilirubin 0.6 0.0 - 1.2 mg/dL    Alkaline Phosphatase 108 40 - 129 U/L    ALT 56 (H) 0 - 40 U/L    AST 50 (H) 0 - 39 U/L   Cannabinoid, Urine   Result Value Ref Range    Cannabinoids Conf Ur 84 ng/mL   Urinalysis with Microscopic   Result Value Ref Range    Color, UA Yellow Straw/Yellow    Clarity, UA Clear Clear    Glucose, Ur >=1000 (A) Negative mg/dL    Bilirubin Urine Negative Negative    Ketones, Urine TRACE (A) Negative mg/dL    Specific Gravity, UA <=1.005 1.005 - 1.030    Blood, Urine MODERATE (A) Negative    pH, UA 6.0 5.0 - 9.0    Protein, UA Negative Negative mg/dL    Urobilinogen, Urine >=8.0 (A) <2.0 E.U./dL    Nitrite, Urine Negative Negative    Leukocyte Esterase, Urine Negative Negative    WBC, UA 1-3 0 - 5 /HPF    RBC, UA >20 0 - 2 /HPF    Bacteria, UA NONE /HPF   Magnesium   Result Value Ref Range    Magnesium 1.2 (L) 1.6 - 2.6 mg/dL   Phosphorus   Result Value Ref Range    Phosphorus 1.8 (L) 2.5 - 4.5 mg/dL   Calcium, Ionized   Result Value Ref Range    Calcium, Ion 1.21 1.15 - 1.33 mmol/L   Blood gases   Result Value Ref Range    Date Analyzed [id number]     Time Analyzed 0501     Source: Blood Arterial     pH, Blood Gas 7.472 (H) 7.350 - 7.450    PCO2 44.7 35.0 - 45.0 mmHg    PO2 64.1 60.0 - 100.0 mmHg    HCO3 31.9 (H) 22.0 - 26.0 mmol/L    B.E. 7.4 (H) -3.0 - 3.0 mmol/L    O2 Sat 93.3 92.0 - 98.5 %    PO2/FIO2 1.60 mmHg/%    AaDO2 158.9 mmHg    RI(T) 248 %    O2Hb 92.1 (L) 94.0 - 97.0 %    COHb 1.1 0.0 - 1.5 %    MetHb 0.2 0.0 - 1.5 %    HHb 6.6 (H) 0.0 - 5.0 %    tHb (est) 12.1 11.5 - 16.5 g/dL    Mode PSV     FIO2 40.0 %    Peep/Cpap 5.0 cmH? O    PS 8 cmH20    Date Of Collection [id number]     Time Collected 0500     Pt Temp 37.0 C     ID [id number]     Lab [id number]     Critical(s) Notified .  No Critical Values    CBC auto differential   Result Value Ref Range    WBC 14.4 (H) 4.5 - 11.5 E9/L    RBC 4.25 % 1.2 0.0 - 6.0 %    Basophils % 0.2 0.0 - 2.0 %    Neutrophils # 7.97 (H) 1.80 - 7.30 E9/L    Immature Granulocytes # 0.18 E9/L    Lymphocytes # 1.58 1.50 - 4.00 E9/L    Monocytes # 1.15 (H) 0.10 - 0.95 E9/L    Eosinophils # 0.13 0.05 - 0.50 E9/L    Basophils # 0.02 0.00 - 0.20 E9/L   Comprehensive Metabolic Panel w/ Reflex to MG   Result Value Ref Range    Sodium 150 (H) 132 - 146 mmol/L    Potassium reflex Magnesium 3.9 3.5 - 5.0 mmol/L    Chloride 104 98 - 107 mmol/L    CO2 33 (H) 22 - 29 mmol/L    Anion Gap 13 7 - 16 mmol/L    Glucose 230 (H) 74 - 109 mg/dL    BUN 20 6 - 20 mg/dL    CREATININE 1.1 0.7 - 1.2 mg/dL    GFR Non-African American >60 >=60 mL/min/1.73    GFR African American >60     Calcium 9.1 8.6 - 10.2 mg/dL    Total Protein 6.9 6.4 - 8.3 g/dL    Alb 2.9 (L) 3.5 - 5.2 g/dL    Total Bilirubin 0.4 0.0 - 1.2 mg/dL    Alkaline Phosphatase 153 (H) 40 - 129 U/L    ALT 49 (H) 0 - 40 U/L    AST 30 0 - 39 U/L   Magnesium   Result Value Ref Range    Magnesium 2.0 1.6 - 2.6 mg/dL   Phosphorus   Result Value Ref Range    Phosphorus 3.2 2.5 - 4.5 mg/dL   Calcium, Ionized   Result Value Ref Range    Calcium, Ion 1.27 1.15 - 1.33 mmol/L   Blood gases   Result Value Ref Range    Date Analyzed [id number]     Time Analyzed 0515     Source: Blood Arterial     pH, Blood Gas 7.478 (H) 7.350 - 7.450    PCO2 49.6 (H) 35.0 - 45.0 mmHg    PO2 70.9 60.0 - 100.0 mmHg    HCO3 35.9 (H) 22.0 - 26.0 mmol/L    B.E. 10.8 (H) -3.0 - 3.0 mmol/L    O2 Sat 94.2 92.0 - 98.5 %    PO2/FIO2 1.77 mmHg/%    AaDO2 147.3 mmHg    RI(T) 208 %    O2Hb 93.4 (L) 94.0 - 97.0 %    COHb 0.0 0.0 - 1.5 %    MetHb 0.8 0.0 - 1.5 %    O2 Content 17.5 mL/dL    HHb 5.8 (H) 0.0 - 5.0 %    tHb (est) 13.3 11.5 - 16.5 g/dL    Mode SPONT     FIO2 40.0 %    Peep/Cpap 5.0 cmH? O    PS 8 cmH20    Date Of Collection [id number]     Time Collected 0500     Pt Temp 37.0 C     ID [id number]     Lab [id number]     Critical(s) Notified .  No Critical Values    CBC auto differential   Result Value Ref Range    WBC 9.9 4.5 - 11.5 E9/L    RBC 4.04 3.80 - 5.80 E12/L    Hemoglobin 11.2 (L) 12.5 - 16.5 g/dL    Hematocrit 34.8 (L) 37.0 - 54.0 %    MCV 86.1 80.0 - 99.9 fL    MCH 27.7 26.0 - 35.0 pg    MCHC 32.2 32.0 - 34.5 %    RDW 14.2 11.5 - 15.0 fL    Platelets 716 034 - 011 E9/L    MPV 9.2 7.0 - 12.0 fL    Neutrophils % 65.9 43.0 - 80.0 %    Immature Granulocytes % 1.8 0.0 - 5.0 %    Lymphocytes % 19.8 (L) 20.0 - 42.0 %    Monocytes % 10.7 2.0 - 12.0 %    Eosinophils % 1.4 0.0 - 6.0 %    Basophils % 0.4 0.0 - 2.0 %    Neutrophils # 6.55 1.80 - 7.30 E9/L    Immature Granulocytes # 0.18 E9/L    Lymphocytes # 1.97 1.50 - 4.00 E9/L    Monocytes # 1.06 (H) 0.10 - 0.95 E9/L    Eosinophils # 0.14 0.05 - 0.50 E9/L    Basophils # 0.04 0.00 - 0.20 E9/L   Comprehensive Metabolic Panel w/ Reflex to MG   Result Value Ref Range    Sodium 150 (H) 132 - 146 mmol/L    Potassium reflex Magnesium 3.6 3.5 - 5.0 mmol/L    Chloride 103 98 - 107 mmol/L    CO2 36 (H) 22 - 29 mmol/L    Anion Gap 11 7 - 16 mmol/L    Glucose 178 (H) 74 - 109 mg/dL    BUN 29 (H) 6 - 20 mg/dL    CREATININE 1.2 0.7 - 1.2 mg/dL    GFR Non-African American >60 >=60 mL/min/1.73    GFR African American >60     Calcium 9.3 8.6 - 10.2 mg/dL    Total Protein 7.4 6.4 - 8.3 g/dL    Alb 2.9 (L) 3.5 - 5.2 g/dL    Total Bilirubin 0.3 0.0 - 1.2 mg/dL    Alkaline Phosphatase 161 (H) 40 - 129 U/L    ALT 43 (H) 0 - 40 U/L    AST 49 (H) 0 - 39 U/L   Blood gases   Result Value Ref Range    Date Analyzed [id number]     Time Analyzed 7763     Source: Blood Arterial     pH, Blood Gas 7.499 (H) 7.350 - 7.450    PCO2 48.8 (H) 35.0 - 45.0 mmHg    PO2 78.3 60.0 - 100.0 mmHg    HCO3 37.1 (H) 22.0 - 26.0 mmol/L    B.E. 12.4 (H) -3.0 - 3.0 mmol/L    O2 Sat 95.8 92.0 - 98.5 %    PO2/FIO2 1.96 mmHg/%    AaDO2 138.4 mmHg    RI(T) 177 %    O2Hb 94.6 94.0 - 97.0 %    COHb 1.0 0.0 - 1.5 %    MetHb 0.3 0.0 - 1.5 %    HHb 4.1 0.0 - 5.0 %    tHb (est) 11.2 (L) 11.5 - HHb 3.0 0.0 - 5.0 %    tHb (est) 11.9 11.5 - 16.5 g/dL    Mode PSV     FIO2 40.0 %    Peep/Cpap 5.0 cmH? O    PS 5 cmH20    Date Of Collection [id number]     Time Collected 0450     Pt Temp 37.0 C     ID [id number]     Lab 37535     Critical(s) Notified .  No Critical Values    CBC auto differential   Result Value Ref Range    WBC 13.2 (H) 4.5 - 11.5 E9/L    RBC 3.98 3.80 - 5.80 E12/L    Hemoglobin 11.0 (L) 12.5 - 16.5 g/dL    Hematocrit 34.3 (L) 37.0 - 54.0 %    MCV 86.2 80.0 - 99.9 fL    MCH 27.6 26.0 - 35.0 pg    MCHC 32.1 32.0 - 34.5 %    RDW 13.8 11.5 - 15.0 fL    Platelets 927 345 - 030 E9/L    MPV 9.9 7.0 - 12.0 fL    Neutrophils % 71.1 43.0 - 80.0 %    Immature Granulocytes % 1.4 0.0 - 5.0 %    Lymphocytes % 17.7 (L) 20.0 - 42.0 %    Monocytes % 6.4 2.0 - 12.0 %    Eosinophils % 3.1 0.0 - 6.0 %    Basophils % 0.3 0.0 - 2.0 %    Neutrophils # 9.37 (H) 1.80 - 7.30 E9/L    Immature Granulocytes # 0.18 E9/L    Lymphocytes # 2.33 1.50 - 4.00 E9/L    Monocytes # 0.84 0.10 - 0.95 E9/L    Eosinophils # 0.41 0.05 - 0.50 E9/L    Basophils # 0.04 0.00 - 0.20 E9/L    Polychromasia 1+     Poikilocytes 1+     Spherocytes 1+    Basic Metabolic Panel   Result Value Ref Range    Sodium 140 132 - 146 mmol/L    Potassium 3.9 3.5 - 5.0 mmol/L    Chloride 93 (L) 98 - 107 mmol/L    CO2 35 (H) 22 - 29 mmol/L    Anion Gap 12 7 - 16 mmol/L    Glucose 186 (H) 74 - 109 mg/dL    BUN 31 (H) 6 - 20 mg/dL    CREATININE 1.0 0.7 - 1.2 mg/dL    GFR Non-African American >60 >=60 mL/min/1.73    GFR African American >60     Calcium 9.3 8.6 - 10.2 mg/dL   POCT Glucose   Result Value Ref Range    Meter Glucose 234 (H) 70 - 110 mg/dL   POCT Glucose   Result Value Ref Range    Meter Glucose 224 (H) 70 - 110 mg/dL   POCT Glucose   Result Value Ref Range    Meter Glucose 155 (H) 70 - 110 mg/dL   POCT Glucose   Result Value Ref Range    Meter Glucose 85 70 - 110 mg/dL   POCT Glucose   Result Value Ref Range    Meter Glucose 100 70 - 110 mg/dL   POCT effusion with associated right lower lobe airspace opacity. Recommend   clinical correlation. 2. Unchanged lines and tubes as described above. XR CHEST PORTABLE   Final Result   1. Since examination dated 06/17/2018, unchanged right-sided pleural   effusion with associated right lower lobe airspace opacity. Recommend   clinical correlation. 2. Unchanged lines and tubes as described above. XR CHEST PORTABLE   Final Result   Stable abnormal chest.            XR CHEST PORTABLE   Final Result   Interval placement of right internal jugular central venous catheter. Interval repositioning of orogastric tube; distal tip excluded from   view on this study. Patchy and hazy opacities in the right lower lung. Appearance   suggestive of airspace infiltrate and atelectasis in the right lung   base. There may be a small right pleural effusion. XR Chest Abdomen Ng Placement   Final Result      CT Head WO Contrast   Final Result   Persistent but slightly improved subarachnoid and small subdural   hematoma in the left temporal /frontal region. Horizontal fracture through the left temporal bone and pansinusitis            XR CHEST PORTABLE   Final Result   Orogastric tube appears coiled in the esophagus. The proximal sidehole   of the orogastric tube remains above the level of the gastroesophageal   junction. Small patchy foci in the right lung, concerning for airspace   infiltrate. ALERT:  THIS IS AN ABNORMAL REPORT      XR Chest Abdomen Ng Placement   Final Result   Nasogastric tube courses below level of diaphragm with sidehole above   level of gastroesophageal junction. This tube may need to be advanced   8 or 9 cm. XR PELVIS (1-2 VIEWS)   Final Result   Limited study, no gross abnormality identified      XR CHEST PORTABLE   Final Result   Nonacute chest.            CT Head WO Contrast   Final Result     Stable left temporal subarachnoid hemorrhage.         Suspect mild locule of gas    within    the left posterior fossa extra-axial region, possibly secondary to    fracture. THIS REPORT CONTAINS FINDINGS THAT MAY BE CRITICAL TO PATIENT CARE. The    findings were verbally communicated via telephone conference with Dr. Juhi Shrestha at    2:41 AM EDT on 6/16/2018. The findings were acknowledged and understood. This addendum has been electronically signed by Bj Saleh MD.      Final      1. Small amount of subarachnoid hemorrhage within the left temporal sulci. 2.  Mild left posterior soft tissue swelling/contusion. 3.  Diffuse paranasal sinusitis. This report has been electronically signed by Bj Saleh MD.      CT Cervical Spine WO Contrast   Final Result      1. No acute findings. 2. Non-acute findings are described above. This report has been electronically signed by Bj Saleh MD.      XR CHEST PORTABLE    (Results Pending)   XR CHEST PORTABLE    (Results Pending)           ---------------------------------------------------PHYSICAL EXAM--------------------------------------      Primary Survey:  Airway: patient, trachea midline   Breathing: Spontaneous, breath sounds equal bilaterally, symmetric chest rise  Circulation: 2+ femoral pulses, 2+ DP/PT pulses  Disability: GCS 13    Constitutional/General: Alert and awake, says \"thank you\" and \"sorry\"  Head: Normocephalic. Large hematoma - occipital scalp, no lacerations. Eyes: PERRL, EOMI, globes intact, no hyphema, no evidence of entrapment  Ears: Left TM - not visualized due to large amount of red blood. Right TM - cerumen occluded. Mouth: Oropharynx clear, handling secretions, no trismus. No dental trauma, no oral trauma  Neck: Immobilized in cervical collar. Back: No midline cervical, thoracic, lumbar spine tenderness. No Stepoffs, lacerations, or deformities. Abrasions to coccyx. Pulmonary: Lungs clear to auscultation bilaterally, no wheezes, rales, or rhonchi.  Not in respiratory No other abrasions noted. Radiologist noted fluid within the mastoid and then later noted that there was a linear skull fracture into that area which would explain the bright red blood. Critical Care: None    Counseling: The emergency provider has spoken with the patient and discussed todays results, in addition to providing specific details for the plan of care and counseling regarding the diagnosis and prognosis. Questions are answered at this time and they are agreeable with the plan.       --------------------------------- IMPRESSION AND DISPOSITION ---------------------------------    IMPRESSION  1. Trauma    2. Subarachnoid hemorrhage ([facility] [address]        DISPOSITION  Disposition: as per consultation   Patient condition is stable    6/16/18, 1:40 AM.    This note is prepared by Maria Del Carmen Noland, acting as Scribe for Pat Vela DO. Pat Vela DO:  The scribe's documentation has been prepared under my direction and personally reviewed by me in its entirety. I confirm that the note above accurately reflects all work, treatment, procedures, and medical decision making performed by me.            Pat Vela DO  06/25/18 1941

## 2018-06-16 NOTE — PROCEDURES
Magalis Reynoso is a 56 y.o. male patient. 1. Trauma    2. Subarachnoid hemorrhage (HCC)      Past Medical History:   Diagnosis Date    Diabetes mellitus ([facility] [address]      Blood pressure 123/82, pulse 93, temperature 99.9 °F (37.7 °C), temperature source Core, resp. rate 21, height 6' 2\" (1.88 m), weight 185 lb 14.4 oz (84.3 kg), SpO2 100 %. Intubation  Date/Time: 6/16/2018 5:35 PM  Performed by: Neha Gale  Authorized by: Sagar Zepeda   Consent: The procedure was performed in an emergent situation. Indications: respiratory distress and  airway protection  Intubation method: fiberoptic oral  Patient status: paralyzed (RSI)  Preoxygenation: BVM  Sedatives: etomidate  Paralytic: succinylcholine  Laryngoscope size: Hendricks 2  Tube size: 8.0 mm  Tube type: cuffed  Number of attempts: 1  Cricoid pressure: yes  Cords visualized: yes  Post-procedure assessment: chest rise and ETCO2 monitor  Breath sounds: equal and absent over the epigastrium  Cuff inflated: yes  ETT to lip: 24 cm  Tube secured with: ETT kidd  Patient tolerance: Patient tolerated the procedure well with no immediate complications  Comments: Bo Wheeler 135   Attending Physician Statement:  I was present during the entire procedure and was actively supervising and directing the resident. There were no complications.     Jarad Johnson MD, FACS  6/16/2018  5:36 PM            Shivam Luna MD  6/16/2018

## 2018-06-16 NOTE — FLOWSHEET NOTE
Patient extremely agitated. Sitting up in bed, thrashing body over side rails. Kicking legs and trying to pull off cardiac monitor, oxygen, collar. Still not following commands. Patient putting staff and self at risk. Bilateral soft wrist restraints continued and bilateral soft ankle restraints started for patient and staff safety.

## 2018-06-16 NOTE — ED NOTES
GCS 13, pt eyes equal and reactive at 4mm     [facility] Mueller RN  06/16/18 [id number]

## 2018-06-16 NOTE — ED NOTES
Bed: 17A-17  Expected date:   Expected time:   Means of arrival:   Comments:  Hold trauma     Ronit Syed RN  06/16/18 0157

## 2018-06-17 ENCOUNTER — APPOINTMENT (OUTPATIENT)
Dept: GENERAL RADIOLOGY | Age: 48
DRG: 055 | End: 2018-06-17
Payer: COMMERCIAL

## 2018-06-17 LAB
AADO2: 179.6 MMHG
ALBUMIN SERPL-MCNC: 3.2 G/DL (ref 3.5–5.2)
ALP BLD-CCNC: 78 U/L (ref 40–129)
ALT SERPL-CCNC: 18 U/L (ref 0–40)
ANION GAP SERPL CALCULATED.3IONS-SCNC: 11 MMOL/L (ref 7–16)
AST SERPL-CCNC: 19 U/L (ref 0–39)
B.E.: 3.3 MMOL/L (ref -3–3)
BASOPHILS ABSOLUTE: 0.03 E9/L (ref 0–0.2)
BASOPHILS RELATIVE PERCENT: 0.3 % (ref 0–2)
BILIRUB SERPL-MCNC: 0.7 MG/DL (ref 0–1.2)
BUN BLDV-MCNC: 14 MG/DL (ref 6–20)
CALCIUM IONIZED: 1.21 MMOL/L (ref 1.15–1.33)
CALCIUM SERPL-MCNC: 8.3 MG/DL (ref 8.6–10.2)
CHLORIDE BLD-SCNC: 102 MMOL/L (ref 98–107)
CO2: 26 MMOL/L (ref 22–29)
COHB: 1.2 % (ref 0–1.5)
CREAT SERPL-MCNC: 1.2 MG/DL (ref 0.7–1.2)
CRITICAL: ABNORMAL
DATE ANALYZED: ABNORMAL
DATE OF COLLECTION: ABNORMAL
EOSINOPHILS ABSOLUTE: 0.02 E9/L (ref 0.05–0.5)
EOSINOPHILS RELATIVE PERCENT: 0.2 % (ref 0–6)
FIO2: 50 %
GFR AFRICAN AMERICAN: >60
GFR NON-AFRICAN AMERICAN: >60 ML/MIN/1.73
GLUCOSE BLD-MCNC: 141 MG/DL (ref 74–109)
HCO3: 24.8 MMOL/L (ref 22–26)
HCT VFR BLD CALC: 34.8 % (ref 37–54)
HEMOGLOBIN: 12.1 G/DL (ref 12.5–16.5)
HHB: 0.8 % (ref 0–5)
IMMATURE GRANULOCYTES #: 0.06 E9/L
IMMATURE GRANULOCYTES %: 0.5 % (ref 0–5)
LAB: ABNORMAL
LYMPHOCYTES ABSOLUTE: 2.6 E9/L (ref 1.5–4)
LYMPHOCYTES RELATIVE PERCENT: 21.7 % (ref 20–42)
Lab: 550
MAGNESIUM: 1.6 MG/DL (ref 1.6–2.6)
MCH RBC QN AUTO: 28.3 PG (ref 26–35)
MCHC RBC AUTO-ENTMCNC: 34.8 % (ref 32–34.5)
MCV RBC AUTO: 81.3 FL (ref 80–99.9)
METER GLUCOSE: 100 MG/DL (ref 70–110)
METER GLUCOSE: 124 MG/DL (ref 70–110)
METER GLUCOSE: 170 MG/DL (ref 70–110)
METER GLUCOSE: 172 MG/DL (ref 70–110)
METER GLUCOSE: 177 MG/DL (ref 70–110)
METER GLUCOSE: 188 MG/DL (ref 70–110)
METER GLUCOSE: 223 MG/DL (ref 70–110)
METHB: 0.2 % (ref 0–1.5)
MODE: AC
MONOCYTES ABSOLUTE: 1.37 E9/L (ref 0.1–0.95)
MONOCYTES RELATIVE PERCENT: 11.4 % (ref 2–12)
MRSA CULTURE ONLY: NORMAL
NEUTROPHILS ABSOLUTE: 7.91 E9/L (ref 1.8–7.3)
NEUTROPHILS RELATIVE PERCENT: 65.9 % (ref 43–80)
O2 SATURATION: 99.2 % (ref 92–98.5)
O2HB: 97.8 % (ref 94–97)
OPERATOR ID: ABNORMAL
PATIENT TEMP: 37 C
PCO2: 28.6 MMHG (ref 35–45)
PDW BLD-RTO: 13.5 FL (ref 11.5–15)
PEEP/CPAP: 5 CMH?O
PFO2: 2.66 MMHG/%
PH BLOOD GAS: 7.56 (ref 7.35–7.45)
PHOSPHORUS: 1.6 MG/DL (ref 2.5–4.5)
PLATELET # BLD: 166 E9/L (ref 130–450)
PMV BLD AUTO: 9.4 FL (ref 7–12)
PO2: 133.2 MMHG (ref 60–100)
POTASSIUM REFLEX MAGNESIUM: 3 MMOL/L (ref 3.5–5)
RBC # BLD: 4.28 E12/L (ref 3.8–5.8)
RI(T): 135 %
RR MECHANICAL: 14 B/MIN
SODIUM BLD-SCNC: 139 MMOL/L (ref 132–146)
SOURCE, BLOOD GAS: ABNORMAL
THB: 12.8 G/DL (ref 11.5–16.5)
TIME ANALYZED: 556
TOTAL PROTEIN: 6.1 G/DL (ref 6.4–8.3)
VT MECHANICAL: 600 ML
WBC # BLD: 12 E9/L (ref 4.5–11.5)

## 2018-06-17 PROCEDURE — 99291 CRITICAL CARE FIRST HOUR: CPT | Performed by: SURGERY

## 2018-06-17 PROCEDURE — 36415 COLL VENOUS BLD VENIPUNCTURE: CPT

## 2018-06-17 PROCEDURE — 94640 AIRWAY INHALATION TREATMENT: CPT

## 2018-06-17 PROCEDURE — 2000000000 HC ICU R&B

## 2018-06-17 PROCEDURE — 6360000002 HC RX W HCPCS: Performed by: STUDENT IN AN ORGANIZED HEALTH CARE EDUCATION/TRAINING PROGRAM

## 2018-06-17 PROCEDURE — 2580000003 HC RX 258: Performed by: STUDENT IN AN ORGANIZED HEALTH CARE EDUCATION/TRAINING PROGRAM

## 2018-06-17 PROCEDURE — 82962 GLUCOSE BLOOD TEST: CPT

## 2018-06-17 PROCEDURE — 2500000003 HC RX 250 WO HCPCS: Performed by: STUDENT IN AN ORGANIZED HEALTH CARE EDUCATION/TRAINING PROGRAM

## 2018-06-17 PROCEDURE — C9113 INJ PANTOPRAZOLE SODIUM, VIA: HCPCS | Performed by: STUDENT IN AN ORGANIZED HEALTH CARE EDUCATION/TRAINING PROGRAM

## 2018-06-17 PROCEDURE — 80053 COMPREHEN METABOLIC PANEL: CPT

## 2018-06-17 PROCEDURE — 71045 X-RAY EXAM CHEST 1 VIEW: CPT

## 2018-06-17 PROCEDURE — 85025 COMPLETE CBC W/AUTO DIFF WBC: CPT

## 2018-06-17 PROCEDURE — 94667 MNPJ CHEST WALL 1ST: CPT

## 2018-06-17 PROCEDURE — 84100 ASSAY OF PHOSPHORUS: CPT

## 2018-06-17 PROCEDURE — 6370000000 HC RX 637 (ALT 250 FOR IP): Performed by: STUDENT IN AN ORGANIZED HEALTH CARE EDUCATION/TRAINING PROGRAM

## 2018-06-17 PROCEDURE — 82805 BLOOD GASES W/O2 SATURATION: CPT

## 2018-06-17 PROCEDURE — 83735 ASSAY OF MAGNESIUM: CPT

## 2018-06-17 PROCEDURE — 82330 ASSAY OF CALCIUM: CPT

## 2018-06-17 PROCEDURE — 94668 MNPJ CHEST WALL SBSQ: CPT

## 2018-06-17 PROCEDURE — 94003 VENT MGMT INPAT SUBQ DAY: CPT

## 2018-06-17 RX ORDER — MAGNESIUM SULFATE IN WATER 40 MG/ML
4 INJECTION, SOLUTION INTRAVENOUS ONCE
Status: COMPLETED | OUTPATIENT
Start: 2018-06-17 | End: 2018-06-17

## 2018-06-17 RX ORDER — DOCUSATE SODIUM 50 MG/5ML
100 LIQUID ORAL 2 TIMES DAILY
Status: DISCONTINUED | OUTPATIENT
Start: 2018-06-17 | End: 2018-07-09 | Stop reason: CLARIF

## 2018-06-17 RX ORDER — LORAZEPAM 2 MG/ML
1 INJECTION INTRAMUSCULAR EVERY 6 HOURS PRN
Status: DISCONTINUED | OUTPATIENT
Start: 2018-06-17 | End: 2018-06-27

## 2018-06-17 RX ORDER — OXYCODONE HCL 5 MG/5 ML
5 SOLUTION, ORAL ORAL EVERY 4 HOURS
Status: DISCONTINUED | OUTPATIENT
Start: 2018-06-17 | End: 2018-06-20

## 2018-06-17 RX ORDER — THIAMINE MONONITRATE (VIT B1) 100 MG
100 TABLET ORAL DAILY
Status: COMPLETED | OUTPATIENT
Start: 2018-06-18 | End: 2018-06-20

## 2018-06-17 RX ORDER — POTASSIUM CHLORIDE 29.8 MG/ML
20 INJECTION INTRAVENOUS ONCE
Status: COMPLETED | OUTPATIENT
Start: 2018-06-17 | End: 2018-06-17

## 2018-06-17 RX ORDER — OXYCODONE HCL 5 MG/5 ML
10 SOLUTION, ORAL ORAL EVERY 4 HOURS PRN
Status: DISCONTINUED | OUTPATIENT
Start: 2018-06-17 | End: 2018-06-26

## 2018-06-17 RX ORDER — ACETAMINOPHEN 160 MG/5ML
650 SOLUTION ORAL EVERY 4 HOURS PRN
Status: DISCONTINUED | OUTPATIENT
Start: 2018-06-17 | End: 2018-07-06

## 2018-06-17 RX ORDER — LEVETIRACETAM 100 MG/ML
500 SOLUTION ORAL 2 TIMES DAILY
Status: COMPLETED | OUTPATIENT
Start: 2018-06-17 | End: 2018-06-23

## 2018-06-17 RX ORDER — IPRATROPIUM BROMIDE AND ALBUTEROL SULFATE 2.5; .5 MG/3ML; MG/3ML
1 SOLUTION RESPIRATORY (INHALATION)
Status: DISCONTINUED | OUTPATIENT
Start: 2018-06-17 | End: 2018-06-26

## 2018-06-17 RX ORDER — PANTOPRAZOLE SODIUM 40 MG/1
40 GRANULE, DELAYED RELEASE ORAL
Status: DISCONTINUED | OUTPATIENT
Start: 2018-06-18 | End: 2018-07-09 | Stop reason: CLARIF

## 2018-06-17 RX ORDER — FOLIC ACID 1 MG/1
1 TABLET ORAL DAILY
Status: COMPLETED | OUTPATIENT
Start: 2018-06-18 | End: 2018-06-20

## 2018-06-17 RX ADMIN — INSULIN LISPRO 3 UNITS: 100 INJECTION, SOLUTION INTRAVENOUS; SUBCUTANEOUS at 23:52

## 2018-06-17 RX ADMIN — OXYCODONE HYDROCHLORIDE 5 MG: 5 SOLUTION ORAL at 00:01

## 2018-06-17 RX ADMIN — ACETAMINOPHEN 650 MG: 650 SOLUTION ORAL at 01:36

## 2018-06-17 RX ADMIN — LEVETIRACETAM 500 MG: 100 SOLUTION ORAL at 21:14

## 2018-06-17 RX ADMIN — LEVETIRACETAM 500 MG: 5 INJECTION INTRAVENOUS at 02:51

## 2018-06-17 RX ADMIN — Medication 10 ML: at 08:28

## 2018-06-17 RX ADMIN — OXYCODONE HYDROCHLORIDE 5 MG: 5 SOLUTION ORAL at 19:57

## 2018-06-17 RX ADMIN — POTASSIUM CHLORIDE 20 MEQ: 29.8 INJECTION, SOLUTION INTRAVENOUS at 08:29

## 2018-06-17 RX ADMIN — INSULIN LISPRO 3 UNITS: 100 INJECTION, SOLUTION INTRAVENOUS; SUBCUTANEOUS at 11:50

## 2018-06-17 RX ADMIN — POTASSIUM & SODIUM PHOSPHATES POWDER PACK 280-160-250 MG 250 MG: 280-160-250 PACK at 21:14

## 2018-06-17 RX ADMIN — PROPOFOL 35 MCG/KG/MIN: 10 INJECTION, EMULSION INTRAVENOUS at 10:07

## 2018-06-17 RX ADMIN — INSULIN LISPRO 3 UNITS: 100 INJECTION, SOLUTION INTRAVENOUS; SUBCUTANEOUS at 16:02

## 2018-06-17 RX ADMIN — MINERAL OIL AND WHITE PETROLATUM: 150; 830 OINTMENT OPHTHALMIC at 10:15

## 2018-06-17 RX ADMIN — ACETAMINOPHEN 650 MG: 650 SOLUTION ORAL at 23:37

## 2018-06-17 RX ADMIN — POTASSIUM & SODIUM PHOSPHATES POWDER PACK 280-160-250 MG 250 MG: 280-160-250 PACK at 16:02

## 2018-06-17 RX ADMIN — PROPOFOL 35 MCG/KG/MIN: 10 INJECTION, EMULSION INTRAVENOUS at 21:37

## 2018-06-17 RX ADMIN — MINERAL OIL AND WHITE PETROLATUM: 150; 830 OINTMENT OPHTHALMIC at 04:56

## 2018-06-17 RX ADMIN — ACETAMINOPHEN 650 MG: 650 SOLUTION ORAL at 18:26

## 2018-06-17 RX ADMIN — Medication 5 ML: at 21:13

## 2018-06-17 RX ADMIN — CHLORHEXIDINE GLUCONATE 15 ML: 1.2 RINSE ORAL at 08:30

## 2018-06-17 RX ADMIN — MINERAL OIL AND WHITE PETROLATUM: 150; 830 OINTMENT OPHTHALMIC at 14:00

## 2018-06-17 RX ADMIN — LEVETIRACETAM 500 MG: 5 INJECTION INTRAVENOUS at 14:40

## 2018-06-17 RX ADMIN — POTASSIUM PHOSPHATE, MONOBASIC AND POTASSIUM PHOSPHATE, DIBASIC 30 MMOL: 224; 236 INJECTION, SOLUTION INTRAVENOUS at 08:45

## 2018-06-17 RX ADMIN — Medication 10 ML: at 08:29

## 2018-06-17 RX ADMIN — OXYCODONE HYDROCHLORIDE 5 MG: 5 SOLUTION ORAL at 04:24

## 2018-06-17 RX ADMIN — POTASSIUM & SODIUM PHOSPHATES POWDER PACK 280-160-250 MG 250 MG: 280-160-250 PACK at 08:23

## 2018-06-17 RX ADMIN — OXYCODONE HYDROCHLORIDE 5 MG: 5 SOLUTION ORAL at 11:56

## 2018-06-17 RX ADMIN — OXYCODONE HYDROCHLORIDE 5 MG: 5 SOLUTION ORAL at 23:37

## 2018-06-17 RX ADMIN — INSULIN LISPRO 6 UNITS: 100 INJECTION, SOLUTION INTRAVENOUS; SUBCUTANEOUS at 20:03

## 2018-06-17 RX ADMIN — Medication 10 ML: at 21:14

## 2018-06-17 RX ADMIN — IPRATROPIUM BROMIDE AND ALBUTEROL SULFATE 1 AMPULE: 2.5; .5 SOLUTION RESPIRATORY (INHALATION) at 15:50

## 2018-06-17 RX ADMIN — MAGNESIUM SULFATE IN WATER 4 G: 40 INJECTION, SOLUTION INTRAVENOUS at 11:43

## 2018-06-17 RX ADMIN — OXYCODONE HYDROCHLORIDE 5 MG: 5 SOLUTION ORAL at 08:24

## 2018-06-17 RX ADMIN — FOLIC ACID 1 MG: 5 INJECTION, SOLUTION INTRAMUSCULAR; INTRAVENOUS; SUBCUTANEOUS at 08:04

## 2018-06-17 RX ADMIN — PROPOFOL 35 MCG/KG/MIN: 10 INJECTION, EMULSION INTRAVENOUS at 04:45

## 2018-06-17 RX ADMIN — CHLORHEXIDINE GLUCONATE 15 ML: 1.2 RINSE ORAL at 21:14

## 2018-06-17 RX ADMIN — MINERAL OIL AND WHITE PETROLATUM: 150; 830 OINTMENT OPHTHALMIC at 18:31

## 2018-06-17 RX ADMIN — IPRATROPIUM BROMIDE AND ALBUTEROL SULFATE 1 AMPULE: 2.5; .5 SOLUTION RESPIRATORY (INHALATION) at 19:35

## 2018-06-17 RX ADMIN — OXYCODONE HYDROCHLORIDE 5 MG: 5 SOLUTION ORAL at 16:02

## 2018-06-17 RX ADMIN — SODIUM CHLORIDE: 9 INJECTION, SOLUTION INTRAVENOUS at 02:50

## 2018-06-17 RX ADMIN — PANTOPRAZOLE SODIUM 40 MG: 40 INJECTION, POWDER, FOR SOLUTION INTRAVENOUS at 08:25

## 2018-06-17 RX ADMIN — INSULIN LISPRO 3 UNITS: 100 INJECTION, SOLUTION INTRAVENOUS; SUBCUTANEOUS at 04:23

## 2018-06-17 RX ADMIN — PROPOFOL 35 MCG/KG/MIN: 10 INJECTION, EMULSION INTRAVENOUS at 14:38

## 2018-06-17 RX ADMIN — POTASSIUM & SODIUM PHOSPHATES POWDER PACK 280-160-250 MG 250 MG: 280-160-250 PACK at 12:00

## 2018-06-17 RX ADMIN — THIAMINE HYDROCHLORIDE 100 MG: 100 INJECTION, SOLUTION INTRAMUSCULAR; INTRAVENOUS at 08:25

## 2018-06-17 RX ADMIN — DOCUSATE SODIUM 100 MG: 50 LIQUID ORAL at 21:13

## 2018-06-17 ASSESSMENT — PULMONARY FUNCTION TESTS
PIF_VALUE: 18
PIF_VALUE: 13
PIF_VALUE: 21
PIF_VALUE: 18
PIF_VALUE: 18

## 2018-06-17 NOTE — FLOWSHEET NOTE
Restraints released for repositioning and reassessment of restraints. Patient continues to grab ETT/ng. Poor safety awareness. Unable to redirect at this time. Bilateral soft wrist restraints continued for patient safety.

## 2018-06-18 ENCOUNTER — APPOINTMENT (OUTPATIENT)
Dept: GENERAL RADIOLOGY | Age: 48
DRG: 055 | End: 2018-06-18
Payer: COMMERCIAL

## 2018-06-18 LAB
AADO2: 154.7 MMHG
ALBUMIN SERPL-MCNC: 3 G/DL (ref 3.5–5.2)
ALP BLD-CCNC: 108 U/L (ref 40–129)
ALT SERPL-CCNC: 56 U/L (ref 0–40)
ANION GAP SERPL CALCULATED.3IONS-SCNC: 11 MMOL/L (ref 7–16)
AST SERPL-CCNC: 50 U/L (ref 0–39)
B.E.: 5.2 MMOL/L (ref -3–3)
BACTERIA: ABNORMAL /HPF
BASOPHILS ABSOLUTE: 0.03 E9/L (ref 0–0.2)
BASOPHILS RELATIVE PERCENT: 0.2 % (ref 0–2)
BILIRUB SERPL-MCNC: 0.6 MG/DL (ref 0–1.2)
BILIRUBIN URINE: NEGATIVE
BLOOD, URINE: ABNORMAL
BUN BLDV-MCNC: 13 MG/DL (ref 6–20)
CALCIUM IONIZED: 1.18 MMOL/L (ref 1.15–1.33)
CALCIUM SERPL-MCNC: 8.2 MG/DL (ref 8.6–10.2)
CHLORIDE BLD-SCNC: 100 MMOL/L (ref 98–107)
CLARITY: CLEAR
CO2: 30 MMOL/L (ref 22–29)
COHB: 1.4 % (ref 0–1.5)
COLOR: YELLOW
CREAT SERPL-MCNC: 1.2 MG/DL (ref 0.7–1.2)
CRITICAL: ABNORMAL
DATE ANALYZED: ABNORMAL
DATE OF COLLECTION: ABNORMAL
EOSINOPHILS ABSOLUTE: 0.02 E9/L (ref 0.05–0.5)
EOSINOPHILS RELATIVE PERCENT: 0.1 % (ref 0–6)
FIO2: 40 %
GFR AFRICAN AMERICAN: >60
GFR NON-AFRICAN AMERICAN: >60 ML/MIN/1.73
GLUCOSE BLD-MCNC: 215 MG/DL (ref 74–109)
GLUCOSE URINE: >=1000 MG/DL
HCO3: 28 MMOL/L (ref 22–26)
HCT VFR BLD CALC: 36.7 % (ref 37–54)
HEMOGLOBIN: 12 G/DL (ref 12.5–16.5)
HHB: 3.2 % (ref 0–5)
IMMATURE GRANULOCYTES #: 0.11 E9/L
IMMATURE GRANULOCYTES %: 0.7 % (ref 0–5)
KETONES, URINE: ABNORMAL MG/DL
LAB: ABNORMAL
LEUKOCYTE ESTERASE, URINE: NEGATIVE
LYMPHOCYTES ABSOLUTE: 1.96 E9/L (ref 1.5–4)
LYMPHOCYTES RELATIVE PERCENT: 13.3 % (ref 20–42)
Lab: 435
MAGNESIUM: 1.9 MG/DL (ref 1.6–2.6)
MCH RBC QN AUTO: 27.3 PG (ref 26–35)
MCHC RBC AUTO-ENTMCNC: 32.7 % (ref 32–34.5)
MCV RBC AUTO: 83.4 FL (ref 80–99.9)
METER GLUCOSE: 231 MG/DL (ref 70–110)
METER GLUCOSE: 243 MG/DL (ref 70–110)
METER GLUCOSE: 265 MG/DL (ref 70–110)
METER GLUCOSE: 266 MG/DL (ref 70–110)
METER GLUCOSE: 282 MG/DL (ref 70–110)
METER GLUCOSE: 285 MG/DL (ref 70–110)
METHB: 0.3 % (ref 0–1.5)
MODE: AC
MONOCYTES ABSOLUTE: 1.15 E9/L (ref 0.1–0.95)
MONOCYTES RELATIVE PERCENT: 7.8 % (ref 2–12)
NEUTROPHILS ABSOLUTE: 11.51 E9/L (ref 1.8–7.3)
NEUTROPHILS RELATIVE PERCENT: 77.9 % (ref 43–80)
NITRITE, URINE: NEGATIVE
O2 SATURATION: 96.7 % (ref 92–98.5)
O2HB: 95.1 % (ref 94–97)
OPERATOR ID: ABNORMAL
PATIENT TEMP: 37 C
PCO2: 35 MMHG (ref 35–45)
PDW BLD-RTO: 13.6 FL (ref 11.5–15)
PEEP/CPAP: 5 CMH?O
PFO2: 2.02 MMHG/%
PH BLOOD GAS: 7.52 (ref 7.35–7.45)
PH UA: 6 (ref 5–9)
PHOSPHORUS: 2.2 MG/DL (ref 2.5–4.5)
PLATELET # BLD: 167 E9/L (ref 130–450)
PMV BLD AUTO: 9.6 FL (ref 7–12)
PO2: 80.7 MMHG (ref 60–100)
POTASSIUM REFLEX MAGNESIUM: 3.1 MMOL/L (ref 3.5–5)
PROTEIN UA: NEGATIVE MG/DL
RBC # BLD: 4.4 E12/L (ref 3.8–5.8)
RBC UA: >20 /HPF (ref 0–2)
RI(T): 192 %
RR MECHANICAL: 14 B/MIN
SODIUM BLD-SCNC: 141 MMOL/L (ref 132–146)
SOURCE, BLOOD GAS: ABNORMAL
SPECIFIC GRAVITY UA: <=1.005 (ref 1–1.03)
THB: 13.2 G/DL (ref 11.5–16.5)
TIME ANALYZED: 442
TOTAL PROTEIN: 6.2 G/DL (ref 6.4–8.3)
UROBILINOGEN, URINE: >=8 E.U./DL
VT MECHANICAL: 550 ML
WBC # BLD: 14.8 E9/L (ref 4.5–11.5)
WBC UA: ABNORMAL /HPF (ref 0–5)

## 2018-06-18 PROCEDURE — 87070 CULTURE OTHR SPECIMN AEROBIC: CPT

## 2018-06-18 PROCEDURE — 71045 X-RAY EXAM CHEST 1 VIEW: CPT

## 2018-06-18 PROCEDURE — 87186 SC STD MICRODIL/AGAR DIL: CPT

## 2018-06-18 PROCEDURE — 80053 COMPREHEN METABOLIC PANEL: CPT

## 2018-06-18 PROCEDURE — 2580000003 HC RX 258: Performed by: STUDENT IN AN ORGANIZED HEALTH CARE EDUCATION/TRAINING PROGRAM

## 2018-06-18 PROCEDURE — 94668 MNPJ CHEST WALL SBSQ: CPT

## 2018-06-18 PROCEDURE — 36415 COLL VENOUS BLD VENIPUNCTURE: CPT

## 2018-06-18 PROCEDURE — 94003 VENT MGMT INPAT SUBQ DAY: CPT

## 2018-06-18 PROCEDURE — 81001 URINALYSIS AUTO W/SCOPE: CPT

## 2018-06-18 PROCEDURE — 84100 ASSAY OF PHOSPHORUS: CPT

## 2018-06-18 PROCEDURE — 99291 CRITICAL CARE FIRST HOUR: CPT | Performed by: SURGERY

## 2018-06-18 PROCEDURE — 6360000002 HC RX W HCPCS: Performed by: STUDENT IN AN ORGANIZED HEALTH CARE EDUCATION/TRAINING PROGRAM

## 2018-06-18 PROCEDURE — 6370000000 HC RX 637 (ALT 250 FOR IP): Performed by: STUDENT IN AN ORGANIZED HEALTH CARE EDUCATION/TRAINING PROGRAM

## 2018-06-18 PROCEDURE — 94640 AIRWAY INHALATION TREATMENT: CPT

## 2018-06-18 PROCEDURE — 87040 BLOOD CULTURE FOR BACTERIA: CPT

## 2018-06-18 PROCEDURE — 2000000000 HC ICU R&B

## 2018-06-18 PROCEDURE — 82805 BLOOD GASES W/O2 SATURATION: CPT

## 2018-06-18 PROCEDURE — 83735 ASSAY OF MAGNESIUM: CPT

## 2018-06-18 PROCEDURE — 82962 GLUCOSE BLOOD TEST: CPT

## 2018-06-18 PROCEDURE — 82330 ASSAY OF CALCIUM: CPT

## 2018-06-18 PROCEDURE — 87205 SMEAR GRAM STAIN: CPT

## 2018-06-18 PROCEDURE — 85025 COMPLETE CBC W/AUTO DIFF WBC: CPT

## 2018-06-18 RX ORDER — POTASSIUM CHLORIDE 20MEQ/15ML
40 LIQUID (ML) ORAL DAILY
Status: DISCONTINUED | OUTPATIENT
Start: 2018-06-18 | End: 2018-06-24

## 2018-06-18 RX ORDER — INSULIN GLARGINE 100 [IU]/ML
15 INJECTION, SOLUTION SUBCUTANEOUS NIGHTLY
Status: DISCONTINUED | OUTPATIENT
Start: 2018-06-18 | End: 2018-06-19

## 2018-06-18 RX ADMIN — OXYCODONE HYDROCHLORIDE 5 MG: 5 SOLUTION ORAL at 11:49

## 2018-06-18 RX ADMIN — POTASSIUM & SODIUM PHOSPHATES POWDER PACK 280-160-250 MG 500 MG: 280-160-250 PACK at 20:47

## 2018-06-18 RX ADMIN — ENOXAPARIN SODIUM 30 MG: 30 INJECTION SUBCUTANEOUS at 20:47

## 2018-06-18 RX ADMIN — OXYCODONE HYDROCHLORIDE 5 MG: 5 SOLUTION ORAL at 20:47

## 2018-06-18 RX ADMIN — PROPOFOL 30 MCG/KG/MIN: 10 INJECTION, EMULSION INTRAVENOUS at 18:24

## 2018-06-18 RX ADMIN — INSULIN LISPRO 6 UNITS: 100 INJECTION, SOLUTION INTRAVENOUS; SUBCUTANEOUS at 04:30

## 2018-06-18 RX ADMIN — PANTOPRAZOLE SODIUM 40 MG: 40 GRANULE, DELAYED RELEASE ORAL at 05:17

## 2018-06-18 RX ADMIN — INSULIN LISPRO 9 UNITS: 100 INJECTION, SOLUTION INTRAVENOUS; SUBCUTANEOUS at 16:31

## 2018-06-18 RX ADMIN — CHLORHEXIDINE GLUCONATE 15 ML: 1.2 RINSE ORAL at 08:19

## 2018-06-18 RX ADMIN — DOCUSATE SODIUM 100 MG: 50 LIQUID ORAL at 20:44

## 2018-06-18 RX ADMIN — INSULIN GLARGINE 15 UNITS: 100 INJECTION, SOLUTION SUBCUTANEOUS at 20:32

## 2018-06-18 RX ADMIN — PROPOFOL 35 MCG/KG/MIN: 10 INJECTION, EMULSION INTRAVENOUS at 02:21

## 2018-06-18 RX ADMIN — POTASSIUM CHLORIDE 40 MEQ: 20 SOLUTION ORAL at 09:46

## 2018-06-18 RX ADMIN — OXYCODONE HYDROCHLORIDE 5 MG: 5 SOLUTION ORAL at 04:24

## 2018-06-18 RX ADMIN — INSULIN LISPRO 9 UNITS: 100 INJECTION, SOLUTION INTRAVENOUS; SUBCUTANEOUS at 20:32

## 2018-06-18 RX ADMIN — CHLORHEXIDINE GLUCONATE 15 ML: 1.2 RINSE ORAL at 20:45

## 2018-06-18 RX ADMIN — Medication 100 MG: at 08:19

## 2018-06-18 RX ADMIN — POTASSIUM & SODIUM PHOSPHATES POWDER PACK 280-160-250 MG 250 MG: 280-160-250 PACK at 08:19

## 2018-06-18 RX ADMIN — ACETAMINOPHEN 650 MG: 650 SOLUTION ORAL at 04:24

## 2018-06-18 RX ADMIN — Medication 10 ML: at 20:44

## 2018-06-18 RX ADMIN — INSULIN LISPRO 9 UNITS: 100 INJECTION, SOLUTION INTRAVENOUS; SUBCUTANEOUS at 11:57

## 2018-06-18 RX ADMIN — Medication 10 ML: at 08:19

## 2018-06-18 RX ADMIN — POTASSIUM & SODIUM PHOSPHATES POWDER PACK 280-160-250 MG 500 MG: 280-160-250 PACK at 16:23

## 2018-06-18 RX ADMIN — INSULIN LISPRO 9 UNITS: 100 INJECTION, SOLUTION INTRAVENOUS; SUBCUTANEOUS at 08:10

## 2018-06-18 RX ADMIN — PROPOFOL 30 MCG/KG/MIN: 10 INJECTION, EMULSION INTRAVENOUS at 22:52

## 2018-06-18 RX ADMIN — FOLIC ACID 1 MG: 1 TABLET ORAL at 08:19

## 2018-06-18 RX ADMIN — OXYCODONE HYDROCHLORIDE 5 MG: 5 SOLUTION ORAL at 16:23

## 2018-06-18 RX ADMIN — OXYCODONE HYDROCHLORIDE 5 MG: 5 SOLUTION ORAL at 23:52

## 2018-06-18 RX ADMIN — OXYCODONE HYDROCHLORIDE 5 MG: 5 SOLUTION ORAL at 08:21

## 2018-06-18 RX ADMIN — INSULIN LISPRO 6 UNITS: 100 INJECTION, SOLUTION INTRAVENOUS; SUBCUTANEOUS at 23:52

## 2018-06-18 RX ADMIN — POTASSIUM & SODIUM PHOSPHATES POWDER PACK 280-160-250 MG 500 MG: 280-160-250 PACK at 13:05

## 2018-06-18 RX ADMIN — IPRATROPIUM BROMIDE AND ALBUTEROL SULFATE 1 AMPULE: 2.5; .5 SOLUTION RESPIRATORY (INHALATION) at 15:49

## 2018-06-18 RX ADMIN — Medication 5 ML: at 20:44

## 2018-06-18 RX ADMIN — ACETAMINOPHEN 650 MG: 650 SOLUTION ORAL at 18:54

## 2018-06-18 RX ADMIN — PROPOFOL 30 MCG/KG/MIN: 10 INJECTION, EMULSION INTRAVENOUS at 06:58

## 2018-06-18 RX ADMIN — LEVETIRACETAM 500 MG: 100 SOLUTION ORAL at 20:47

## 2018-06-18 RX ADMIN — ACETAMINOPHEN 650 MG: 650 SOLUTION ORAL at 11:51

## 2018-06-18 RX ADMIN — LEVETIRACETAM 500 MG: 100 SOLUTION ORAL at 08:19

## 2018-06-18 RX ADMIN — IPRATROPIUM BROMIDE AND ALBUTEROL SULFATE 1 AMPULE: 2.5; .5 SOLUTION RESPIRATORY (INHALATION) at 12:22

## 2018-06-18 RX ADMIN — IPRATROPIUM BROMIDE AND ALBUTEROL SULFATE 1 AMPULE: 2.5; .5 SOLUTION RESPIRATORY (INHALATION) at 08:07

## 2018-06-18 RX ADMIN — ACETAMINOPHEN 650 MG: 650 SOLUTION ORAL at 23:52

## 2018-06-18 RX ADMIN — DOCUSATE SODIUM 100 MG: 50 LIQUID ORAL at 08:19

## 2018-06-18 ASSESSMENT — PAIN SCALES - GENERAL
PAINLEVEL_OUTOF10: 4
PAINLEVEL_OUTOF10: 0

## 2018-06-18 ASSESSMENT — PULMONARY FUNCTION TESTS
PIF_VALUE: 13
PIF_VALUE: 22
PIF_VALUE: 14
PIF_VALUE: 14
PIF_VALUE: 20
PIF_VALUE: 14
PIF_VALUE: 14
PIF_VALUE: 18
PIF_VALUE: 14
PIF_VALUE: 13
PIF_VALUE: 40

## 2018-06-18 NOTE — PROGRESS NOTES
Surgical Intensive Care Unit   Daily Progress Note     Date of admission: 6/16/2018    Reason for ICU: fall from standing, SAH, temporal bone fx    Pertinent Hospital Course Events:   6/16 admitted to SICU for [facility], became extremely agitated throughout the day, intubated for airway protection, RIJ CL placed, thiamine, folate started  6/17, febrile, tolerated PS trial    Overnight Events:   Continues to have periods of agitation,remains febrile, increased nasal and tracheal secretions    Subjective: intubated and sedated    Physical Exam:   /75   Pulse 77   Temp 100 °F (37.8 °C) (Core)   Resp 14   Ht 6' 2\" (1.88 m)   Wt 183 lb 1.6 oz (83.1 kg)   SpO2 100%   BMI 23.51 kg/m²      CONSTITUTIONAL: intubated  NEURO: intubated and sedated, purposeful movement, not following commands  HEENT: PERRL, 3 mm  NECK aspen collar in place  LUNGS: coarse BS  CARDIOVASCULAR: RRR, no mumur  ABDOMEN: soft, non distended, no gaurding  MUSCULOSKELETAL: intermittently MAEx4    ASSESSMENT / PLAN:     Assessment/Plan:       · Neuro:      s.p fall while standing, SAH, L displaced mastoid fx, temporal bone fx  new R inf frontal lobe contusion on 2nd CT, repeat decrease SAH/frontal lobe, small subdural, pansinusitis  Hx ETOH abuse/use,on thiamine, folate, cont  Monitor neuro status    Neurosurgery following, no interventions    Keppra for seizure prophylaxis  Patient became extremely agitated, given ativan, haldol and phenobarb loading dose, intubated and sedated on propofol with ativan, haldol prn-cont      · CV:   No acute issues   Monitor hemodynamics      · Pulm: Intubated 6/16 for airway protection, on ventilator /14/5/50%  Monitor RR & SpO2    Bronchodilators  Daily CXR,ABG      · GI:   No acute issues. On TF    Monitor bowel function  Phenergan prn    · Renal:   No acute issues.     Monitor electrolytes & replace as needed    Monitor urine output    · ID:   Pansinusitis  Remains febrile  Leukocytosis - obtain BC, U/A, sputum cx     · Endocrine:     Hyperglycemia, HX of diabetes. ISS, lantus started  Monitor BS    · MSK:   No acute issues   PT/OT when able      · Heme:   No acute issues    Monitor CBC      Bowel regimen: MOM  Pain control/Sedation:  Tylenol, Roxicodone  DVT prophylaxis: SCDs, lovenox     GI: Protonix. Glucose protocol:  ISS  Mouth/Eye care: As needed  Cifuentes: Keep in place for critical care monitoring of fluid balance.     Code status:    Full Code    Patient/Family update: as available    Disposition:  Continue current care  Signed:  Saran Coe DO, PGY3  6/18/2018

## 2018-06-18 NOTE — FLOWSHEET NOTE
Pt sedated on ventilator. Pulling at tubes and lines. No evidence of learning with verbal re-direction. Bilateral soft wrist restraints continued.

## 2018-06-18 NOTE — PROGRESS NOTES
[facility] Quality Flow/Interdisciplinary Rounds Progress Note        Quality Flow Rounds held on June 18, 2018    Disciplines Attending:  , Nursing Unit Leadership, Pharmacist, ICU Physician Team.    Goldie Peacock was admitted on 6/16/2018  1:22 AM    Anticipated Discharge Date:  Expected Discharge Date: 06/20/18    Disposition:    Cl Score:  Cl Scale Score: 16    Readmission Risk              Risk of Unplanned Readmission:        13             Discussed patient goal for the day, patient clinical progression, and barriers to discharge. The following Goal(s) of the Day/Commitment(s) have been identified:  Wean ventilator as able. Continue tube feeds. Correct electrolyte imbalance. Continue ICU care.     Ulysses Broker  June 18, 2018

## 2018-06-18 NOTE — PROGRESS NOTES
[facility]  SURGICAL INTENSIVE CARE UNIT (SICU)  ATTENDING PHYSICIAN CRITICAL CARE PROGRESS NOTE     I have examined the patient, reviewed the record, and discussed the case with the APN/ resident. Please refer to the APN/ resident's note. I agree with the assessment and plan. I have reviewed all relevant labs and imaging data. The following summarizes my clinical findings and independent assessment. CC:  Critical care management after fall    Hospital Course/Overnight Events:  6/16--admitted after fall; intubated for agitation  6/18--no issues overnight    Intubated/sedated/narcotized  Agitated off of sedation; purposeful  Not following commands  Eyes to voice  Heart: Regular  Lungs: Fairly clear on the left; coarse on the right  Abdomen: Soft; bowel sounds active; nontender/nondistended  Skin: Warm/dry    Patient Active Problem List    Diagnosis Date Noted    Trauma 06/16/2018    Subarachnoid hemorrhage ([facility] [address] 06/16/2018    Closed fracture of temporal bone ([facility] [address] 06/16/2018       Status post fall  SAH--monitor neuro exam  Temporal bone fracture--pain control  Acute respiratory failure--continue mechanical vent support; wean as able  Hypoalbuminemia/moderate protein calorie malnutrition--continue tube feeds  Elevated LFTs--monitor labs  Electrolyte imbalance (hypokalemia/hypophosphatemia)--correct as able  Leukocytosis/pyrexia--monitor; check U/A  DVT risk--PCDs/Start Lovenox    Patient is at risk for neurologic/respiratory/metabolic deterioration requires ongoing ICU care    Millie Huang MD, FACS  6/18/2018  9:03 AM      Critical care time exclusive of teaching and procedures = 41 minutes     NOTE: This report was transcribed using voice recognition software. Every effort was made to ensure accuracy; however, inadvertent computerized transcription errors may be present.

## 2018-06-18 NOTE — PROGRESS NOTES
Nothing new to add from neurosurgical stand point at this time. IMPRESSION:  Cerebral contusion with subarachnoid hemorrhage, left  frontotemporal lobe.  Blood in the left ear canal, possibly basal skull  fracture of temporal bone. Sedated & intubated at this time.   Reported to move all extremities when not sedated

## 2018-06-18 NOTE — FLOWSHEET NOTE
Patient reaches for ETT and NG when unrestrained. Unable to redirect and reorient patient. Remains in two point soft wrist restraints for patient safety.

## 2018-06-18 NOTE — PLAN OF CARE
Patient's PCP Beatriz Mchugh notified of patient's admission to trauma service at [facility]

## 2018-06-19 ENCOUNTER — APPOINTMENT (OUTPATIENT)
Dept: GENERAL RADIOLOGY | Age: 48
DRG: 055 | End: 2018-06-19
Payer: COMMERCIAL

## 2018-06-19 LAB
AADO2: 158.9 MMHG
ALBUMIN SERPL-MCNC: 2.3 G/DL (ref 3.5–5.2)
ALP BLD-CCNC: 102 U/L (ref 40–129)
ALT SERPL-CCNC: 60 U/L (ref 0–40)
ANION GAP SERPL CALCULATED.3IONS-SCNC: 11 MMOL/L (ref 7–16)
ANION GAP SERPL CALCULATED.3IONS-SCNC: 11 MMOL/L (ref 7–16)
AST SERPL-CCNC: 38 U/L (ref 0–39)
B.E.: 7.4 MMOL/L (ref -3–3)
BASOPHILS ABSOLUTE: 0.04 E9/L (ref 0–0.2)
BASOPHILS RELATIVE PERCENT: 0.3 % (ref 0–2)
BILIRUB SERPL-MCNC: 0.5 MG/DL (ref 0–1.2)
BUN BLDV-MCNC: 10 MG/DL (ref 6–20)
BUN BLDV-MCNC: 12 MG/DL (ref 6–20)
CALCIUM IONIZED: 1.21 MMOL/L (ref 1.15–1.33)
CALCIUM SERPL-MCNC: 6.7 MG/DL (ref 8.6–10.2)
CALCIUM SERPL-MCNC: 8.7 MG/DL (ref 8.6–10.2)
CHLORIDE BLD-SCNC: 104 MMOL/L (ref 98–107)
CHLORIDE BLD-SCNC: 110 MMOL/L (ref 98–107)
CO2: 26 MMOL/L (ref 22–29)
CO2: 32 MMOL/L (ref 22–29)
COHB: 1.1 % (ref 0–1.5)
CREAT SERPL-MCNC: 1.1 MG/DL (ref 0.7–1.2)
CREAT SERPL-MCNC: 1.2 MG/DL (ref 0.7–1.2)
CRITICAL: ABNORMAL
DATE ANALYZED: ABNORMAL
DATE OF COLLECTION: ABNORMAL
EOSINOPHILS ABSOLUTE: 0.04 E9/L (ref 0.05–0.5)
EOSINOPHILS RELATIVE PERCENT: 0.3 % (ref 0–6)
FIO2: 40 %
GFR AFRICAN AMERICAN: >60
GFR AFRICAN AMERICAN: >60
GFR NON-AFRICAN AMERICAN: >60 ML/MIN/1.73
GFR NON-AFRICAN AMERICAN: >60 ML/MIN/1.73
GLUCOSE BLD-MCNC: 187 MG/DL (ref 74–109)
GLUCOSE BLD-MCNC: 274 MG/DL (ref 74–109)
HCO3: 31.9 MMOL/L (ref 22–26)
HCT VFR BLD CALC: 36.5 % (ref 37–54)
HEMOGLOBIN: 11.8 G/DL (ref 12.5–16.5)
HHB: 6.6 % (ref 0–5)
IMMATURE GRANULOCYTES #: 0.11 E9/L
IMMATURE GRANULOCYTES %: 0.8 % (ref 0–5)
LAB: ABNORMAL
LYMPHOCYTES ABSOLUTE: 1.83 E9/L (ref 1.5–4)
LYMPHOCYTES RELATIVE PERCENT: 12.7 % (ref 20–42)
Lab: 500
MAGNESIUM: 1.2 MG/DL (ref 1.6–2.6)
MAGNESIUM: 2.2 MG/DL (ref 1.6–2.6)
MCH RBC QN AUTO: 27.8 PG (ref 26–35)
MCHC RBC AUTO-ENTMCNC: 32.3 % (ref 32–34.5)
MCV RBC AUTO: 85.9 FL (ref 80–99.9)
METER GLUCOSE: 215 MG/DL (ref 70–110)
METER GLUCOSE: 223 MG/DL (ref 70–110)
METER GLUCOSE: 236 MG/DL (ref 70–110)
METER GLUCOSE: 252 MG/DL (ref 70–110)
METER GLUCOSE: 319 MG/DL (ref 70–110)
METER GLUCOSE: 336 MG/DL (ref 70–110)
METHB: 0.2 % (ref 0–1.5)
MODE: ABNORMAL
MONOCYTES ABSOLUTE: 1.17 E9/L (ref 0.1–0.95)
MONOCYTES RELATIVE PERCENT: 8.1 % (ref 2–12)
NEUTROPHILS ABSOLUTE: 11.19 E9/L (ref 1.8–7.3)
NEUTROPHILS RELATIVE PERCENT: 77.8 % (ref 43–80)
O2 SATURATION: 93.3 % (ref 92–98.5)
O2HB: 92.1 % (ref 94–97)
OPERATOR ID: ABNORMAL
PATIENT TEMP: 37 C
PCO2: 44.7 MMHG (ref 35–45)
PDW BLD-RTO: 13.7 FL (ref 11.5–15)
PEEP/CPAP: 5 CMH?O
PFO2: 1.6 MMHG/%
PH BLOOD GAS: 7.47 (ref 7.35–7.45)
PHOSPHORUS: 1.8 MG/DL (ref 2.5–4.5)
PHOSPHORUS: 2.8 MG/DL (ref 2.5–4.5)
PLATELET # BLD: 177 E9/L (ref 130–450)
PMV BLD AUTO: 9.9 FL (ref 7–12)
PO2: 64.1 MMHG (ref 60–100)
POTASSIUM REFLEX MAGNESIUM: 2.8 MMOL/L (ref 3.5–5)
POTASSIUM SERPL-SCNC: 3.9 MMOL/L (ref 3.5–5)
PS: 8 CMH20
RBC # BLD: 4.25 E12/L (ref 3.8–5.8)
RI(T): 248 %
SODIUM BLD-SCNC: 147 MMOL/L (ref 132–146)
SODIUM BLD-SCNC: 147 MMOL/L (ref 132–146)
SOURCE, BLOOD GAS: ABNORMAL
THB: 12.1 G/DL (ref 11.5–16.5)
TIME ANALYZED: 503
TOTAL PROTEIN: 5.3 G/DL (ref 6.4–8.3)
WBC # BLD: 14.4 E9/L (ref 4.5–11.5)

## 2018-06-19 PROCEDURE — 80053 COMPREHEN METABOLIC PANEL: CPT

## 2018-06-19 PROCEDURE — 83735 ASSAY OF MAGNESIUM: CPT

## 2018-06-19 PROCEDURE — 36592 COLLECT BLOOD FROM PICC: CPT | Performed by: NURSE PRACTITIONER

## 2018-06-19 PROCEDURE — 94003 VENT MGMT INPAT SUBQ DAY: CPT

## 2018-06-19 PROCEDURE — 2000000000 HC ICU R&B

## 2018-06-19 PROCEDURE — 84100 ASSAY OF PHOSPHORUS: CPT

## 2018-06-19 PROCEDURE — 82330 ASSAY OF CALCIUM: CPT

## 2018-06-19 PROCEDURE — 6360000002 HC RX W HCPCS: Performed by: STUDENT IN AN ORGANIZED HEALTH CARE EDUCATION/TRAINING PROGRAM

## 2018-06-19 PROCEDURE — 99291 CRITICAL CARE FIRST HOUR: CPT | Performed by: SURGERY

## 2018-06-19 PROCEDURE — 6370000000 HC RX 637 (ALT 250 FOR IP): Performed by: STUDENT IN AN ORGANIZED HEALTH CARE EDUCATION/TRAINING PROGRAM

## 2018-06-19 PROCEDURE — 80048 BASIC METABOLIC PNL TOTAL CA: CPT

## 2018-06-19 PROCEDURE — 2580000003 HC RX 258: Performed by: STUDENT IN AN ORGANIZED HEALTH CARE EDUCATION/TRAINING PROGRAM

## 2018-06-19 PROCEDURE — 71045 X-RAY EXAM CHEST 1 VIEW: CPT

## 2018-06-19 PROCEDURE — 85025 COMPLETE CBC W/AUTO DIFF WBC: CPT

## 2018-06-19 PROCEDURE — 82805 BLOOD GASES W/O2 SATURATION: CPT

## 2018-06-19 PROCEDURE — 94640 AIRWAY INHALATION TREATMENT: CPT

## 2018-06-19 PROCEDURE — 2500000003 HC RX 250 WO HCPCS: Performed by: STUDENT IN AN ORGANIZED HEALTH CARE EDUCATION/TRAINING PROGRAM

## 2018-06-19 PROCEDURE — 36415 COLL VENOUS BLD VENIPUNCTURE: CPT

## 2018-06-19 PROCEDURE — 82962 GLUCOSE BLOOD TEST: CPT

## 2018-06-19 RX ORDER — INSULIN GLARGINE 100 [IU]/ML
30 INJECTION, SOLUTION SUBCUTANEOUS NIGHTLY
Status: DISCONTINUED | OUTPATIENT
Start: 2018-06-19 | End: 2018-06-20

## 2018-06-19 RX ORDER — MAGNESIUM SULFATE IN WATER 40 MG/ML
4 INJECTION, SOLUTION INTRAVENOUS ONCE
Status: COMPLETED | OUTPATIENT
Start: 2018-06-19 | End: 2018-06-19

## 2018-06-19 RX ADMIN — OXYCODONE HYDROCHLORIDE 5 MG: 5 SOLUTION ORAL at 15:30

## 2018-06-19 RX ADMIN — POTASSIUM & SODIUM PHOSPHATES POWDER PACK 280-160-250 MG 500 MG: 280-160-250 PACK at 12:20

## 2018-06-19 RX ADMIN — POTASSIUM CHLORIDE 40 MEQ: 20 SOLUTION ORAL at 08:10

## 2018-06-19 RX ADMIN — PROPOFOL 20 MCG/KG/MIN: 10 INJECTION, EMULSION INTRAVENOUS at 12:19

## 2018-06-19 RX ADMIN — Medication 10 ML: at 10:14

## 2018-06-19 RX ADMIN — DOCUSATE SODIUM 100 MG: 50 LIQUID ORAL at 20:22

## 2018-06-19 RX ADMIN — ENOXAPARIN SODIUM 30 MG: 30 INJECTION SUBCUTANEOUS at 08:34

## 2018-06-19 RX ADMIN — PROPOFOL 20 MCG/KG/MIN: 10 INJECTION, EMULSION INTRAVENOUS at 20:20

## 2018-06-19 RX ADMIN — CHLORHEXIDINE GLUCONATE 15 ML: 1.2 RINSE ORAL at 08:17

## 2018-06-19 RX ADMIN — IPRATROPIUM BROMIDE AND ALBUTEROL SULFATE 1 AMPULE: 2.5; .5 SOLUTION RESPIRATORY (INHALATION) at 07:57

## 2018-06-19 RX ADMIN — IPRATROPIUM BROMIDE AND ALBUTEROL SULFATE 1 AMPULE: 2.5; .5 SOLUTION RESPIRATORY (INHALATION) at 11:49

## 2018-06-19 RX ADMIN — INSULIN LISPRO 6 UNITS: 100 INJECTION, SOLUTION INTRAVENOUS; SUBCUTANEOUS at 20:21

## 2018-06-19 RX ADMIN — INSULIN GLARGINE 30 UNITS: 100 INJECTION, SOLUTION SUBCUTANEOUS at 20:21

## 2018-06-19 RX ADMIN — OXYCODONE HYDROCHLORIDE 5 MG: 5 SOLUTION ORAL at 12:19

## 2018-06-19 RX ADMIN — PROPOFOL 35 MCG/KG/MIN: 10 INJECTION, EMULSION INTRAVENOUS at 04:38

## 2018-06-19 RX ADMIN — VANCOMYCIN HYDROCHLORIDE 1500 MG: 10 INJECTION, POWDER, LYOPHILIZED, FOR SOLUTION INTRAVENOUS at 12:44

## 2018-06-19 RX ADMIN — CHLORHEXIDINE GLUCONATE 15 ML: 1.2 RINSE ORAL at 20:20

## 2018-06-19 RX ADMIN — POTASSIUM & SODIUM PHOSPHATES POWDER PACK 280-160-250 MG 500 MG: 280-160-250 PACK at 20:21

## 2018-06-19 RX ADMIN — DOCUSATE SODIUM 100 MG: 50 LIQUID ORAL at 08:15

## 2018-06-19 RX ADMIN — ACETAMINOPHEN 650 MG: 650 SOLUTION ORAL at 08:10

## 2018-06-19 RX ADMIN — MINERAL OIL AND WHITE PETROLATUM: 150; 830 OINTMENT OPHTHALMIC at 16:33

## 2018-06-19 RX ADMIN — LEVETIRACETAM 500 MG: 100 SOLUTION ORAL at 20:20

## 2018-06-19 RX ADMIN — Medication 100 MG: at 08:15

## 2018-06-19 RX ADMIN — MAGNESIUM SULFATE IN WATER 4 G: 40 INJECTION, SOLUTION INTRAVENOUS at 08:17

## 2018-06-19 RX ADMIN — INSULIN LISPRO 9 UNITS: 100 INJECTION, SOLUTION INTRAVENOUS; SUBCUTANEOUS at 16:36

## 2018-06-19 RX ADMIN — INSULIN LISPRO 6 UNITS: 100 INJECTION, SOLUTION INTRAVENOUS; SUBCUTANEOUS at 04:05

## 2018-06-19 RX ADMIN — LORAZEPAM 1 MG: 2 INJECTION INTRAMUSCULAR; INTRAVENOUS at 04:00

## 2018-06-19 RX ADMIN — LORAZEPAM 1 MG: 2 INJECTION INTRAMUSCULAR; INTRAVENOUS at 10:13

## 2018-06-19 RX ADMIN — OXYCODONE HYDROCHLORIDE 5 MG: 5 SOLUTION ORAL at 08:10

## 2018-06-19 RX ADMIN — Medication 5 ML: at 20:20

## 2018-06-19 RX ADMIN — Medication 10 ML: at 08:28

## 2018-06-19 RX ADMIN — MINERAL OIL AND WHITE PETROLATUM: 150; 830 OINTMENT OPHTHALMIC at 20:20

## 2018-06-19 RX ADMIN — OXYCODONE HYDROCHLORIDE 5 MG: 5 SOLUTION ORAL at 20:20

## 2018-06-19 RX ADMIN — IPRATROPIUM BROMIDE AND ALBUTEROL SULFATE 1 AMPULE: 2.5; .5 SOLUTION RESPIRATORY (INHALATION) at 19:38

## 2018-06-19 RX ADMIN — PANTOPRAZOLE SODIUM 40 MG: 40 GRANULE, DELAYED RELEASE ORAL at 06:30

## 2018-06-19 RX ADMIN — ENOXAPARIN SODIUM 30 MG: 30 INJECTION SUBCUTANEOUS at 20:20

## 2018-06-19 RX ADMIN — INSULIN LISPRO 12 UNITS: 100 INJECTION, SOLUTION INTRAVENOUS; SUBCUTANEOUS at 14:01

## 2018-06-19 RX ADMIN — FOLIC ACID 1 MG: 1 TABLET ORAL at 08:15

## 2018-06-19 RX ADMIN — POTASSIUM PHOSPHATE, MONOBASIC AND POTASSIUM PHOSPHATE, DIBASIC 20 MMOL: 224; 236 INJECTION, SOLUTION INTRAVENOUS at 08:27

## 2018-06-19 RX ADMIN — Medication 10 ML: at 20:22

## 2018-06-19 RX ADMIN — POTASSIUM & SODIUM PHOSPHATES POWDER PACK 280-160-250 MG 500 MG: 280-160-250 PACK at 15:30

## 2018-06-19 RX ADMIN — MINERAL OIL AND WHITE PETROLATUM: 150; 830 OINTMENT OPHTHALMIC at 08:10

## 2018-06-19 RX ADMIN — IPRATROPIUM BROMIDE AND ALBUTEROL SULFATE 1 AMPULE: 2.5; .5 SOLUTION RESPIRATORY (INHALATION) at 16:29

## 2018-06-19 RX ADMIN — INSULIN LISPRO 6 UNITS: 100 INJECTION, SOLUTION INTRAVENOUS; SUBCUTANEOUS at 08:18

## 2018-06-19 RX ADMIN — LEVETIRACETAM 500 MG: 100 SOLUTION ORAL at 08:17

## 2018-06-19 RX ADMIN — OXYCODONE HYDROCHLORIDE 5 MG: 5 SOLUTION ORAL at 04:05

## 2018-06-19 RX ADMIN — ACETAMINOPHEN 650 MG: 650 SOLUTION ORAL at 16:33

## 2018-06-19 RX ADMIN — POTASSIUM & SODIUM PHOSPHATES POWDER PACK 280-160-250 MG 500 MG: 280-160-250 PACK at 08:13

## 2018-06-19 ASSESSMENT — PULMONARY FUNCTION TESTS
PIF_VALUE: 17
PIF_VALUE: 14
PIF_VALUE: 18
PIF_VALUE: 18
PIF_VALUE: 16
PIF_VALUE: 17
PIF_VALUE: 13
PIF_VALUE: 17
PIF_VALUE: 14
PIF_VALUE: 17
PIF_VALUE: 19
PIF_VALUE: 14
PIF_VALUE: 17
PIF_VALUE: 14
PIF_VALUE: 17
PIF_VALUE: 19
PIF_VALUE: 17

## 2018-06-19 ASSESSMENT — PAIN SCALES - GENERAL
PAINLEVEL_OUTOF10: 0

## 2018-06-19 NOTE — PROGRESS NOTES
[facility] Quality Flow/Interdisciplinary Rounds Progress Note        Quality Flow Rounds held on June 19, 2018    Disciplines Attending:  , Nursing Unit Leadership, Pharmacist, ICU Physician Team.    Chay Genao was admitted on 6/16/2018  1:22 AM    Anticipated Discharge Date:  Expected Discharge Date: 06/20/18    Disposition:    Cl Score:  Cl Scale Score: 17    Readmission Risk              Risk of Unplanned Readmission:        14             Discussed patient goal for the day, patient clinical progression, and barriers to discharge. The following Goal(s) of the Day/Commitment(s) have been identified:  Wean vent as able. Start TF. Continue ICU care.     Karissa Orona  June 19, 2018

## 2018-06-19 NOTE — PROGRESS NOTES
Nutrition Assessment    Type and Reason for Visit: Initial, Consult    Nutrition Recommendations: Modify current Tube Feeding   Current propofol providing an additional 200kcals  Recommend Glucerna @60 w/ protein modular BID to provide: 1440ml, 1728kcals, 86gm pro, 1159ml water (with protein modular BID: 1928kcals (2128kcals w/ propofol), 138gm pro)    Malnutrition Assessment:  · Malnutrition Status: At risk for malnutrition  · Context: Acute illness or injury  · Findings of the 6 clinical characteristics of malnutrition (Minimum of 2 out of 6 clinical characteristics is required to make the diagnosis of moderate or severe Protein Calorie Malnutrition based on AND/ASPEN Guidelines):  1. Energy Intake-Less than or equal to 50%,  (x2 days)    2. Weight Loss-Unable to assess, unable to assess  3. Fat Loss-No significant subcutaneous fat loss,    4. Muscle Loss-No significant muscle mass loss,    5. Fluid Accumulation-No significant fluid accumulation,    6.   Strength-Not measured    Nutrition Diagnosis:   · Problem: Inadequate oral intake  · Etiology: related to Impaired respiratory function-inability to consume food     Signs and symptoms:  as evidenced by Intubation, NPO status due to medical condition, Nutrition support - EN    Nutrition Assessment:  · Nutrition-Focused Physical Findings: pt intubated/sedated, MAPS WNL, abd soft, +BS, trace edema, -I/O, hypernatremia, agitation  · Wound Type: None (noted abrasions)  · Current Nutrition Therapies:  · Oral Diet Orders: NPO   · Tube Feeding (TF) Orders:   · Feeding Route: Nasogastric  · Formula: Standard w/Fiber  · Rate (ml/hr):50 ml/hr    · Volume (ml/day): 1200 ml/day  · Duration: Continuous 24hrs  · TF Residuals: Less than or equal to 250ml  · Water Flushes: Per Nursing Protocol (30cc q 4h)  · Current TF & Flush Orders Provides: 1200ml TV, 1440 kcal, 67 g pro, 968 ml water, 1148 ml total water   · Additional Calories: Propofol 7.6mL/hr providing an additional 200 kcals  · Anthropometric Measures:  · Ht: 6' 2\" (188 cm)   · Current Body Wt: 183 lb (83 kg) (Bed Scale 6/19)  · Admission Body Wt: 185 lb (83.9 kg) (6/16 Bed Scale)  · Usual Body Wt:  (UTO)  · % Weight Change: noted wt loss since adm, current fluids negative at this time. Unable to assess overall wt hx at this time ,     · Ideal Body Wt: 190 lb (86.2 kg), % Ideal Body 96%  · BMI Classification: BMI 18.5 - 24.9 Normal Weight  · Comparative Standards (Estimated Nutrition Needs):  · Estimated Daily Total Kcal: 6520-4397 (PS3B: 2146 kcal, MV 5.96, TMax 38.7)  · Estimated Daily Protein (g): 125-150 (1.5-1.8gm/kg CBW)    Estimated Intake vs Estimated Needs: Intake Less Than Needs    Nutrition Risk Level: Moderate    Nutrition Interventions:   Continued Inpatient Monitoring, Education not appropriate at this time, Coordination of Care    Nutrition Evaluation:   · Evaluation: Goals set   · Goals: Tolerate TF at goal rate   · Monitoring: TF Intake, Gastric Residuals, Skin Integrity, Fluid Balance, Ascites/Edema, Mental Status/Confusion, Weight, Comparative Standards, Pertinent Labs, NPO Status, TF Tolerance    See Adult Nutrition Doc Flowsheet for more detail.      Electronically signed by Estuardo Gongora RD, LD on 6/19/18 at 10:25 AM  Contact Number: [phone]

## 2018-06-19 NOTE — PROGRESS NOTES
[facility]  SURGICAL INTENSIVE CARE UNIT (SICU)  ATTENDING PHYSICIAN CRITICAL CARE PROGRESS NOTE     I have examined the patient, reviewed the record, and discussed the case with the APN/ resident. Please refer to the APN/ resident's note. I agree with the assessment and plan. I have reviewed all relevant labs and imaging data. The following summarizes my clinical findings and independent assessment. CC:  Critical care management after fall    Hospital Course/Overnight Events:  6/16--admitted after fall; intubated for agitation  6/18--no issues overnight  6/19--intermittent agitation    Intubated/sedated/narcotized  Agitated off of sedation; purposeful  Not following commands  Eyes to voice  Heart: Regular  Lungs: Fairly clear bilaterally  Abdomen: Soft; bowel sounds active; nontender/nondistended  Skin: Warm/dry    Patient Active Problem List    Diagnosis Date Noted    Trauma 06/16/2018    Subarachnoid hemorrhage ([facility] [address] 06/16/2018    Closed fracture of temporal bone ([facility] [address] 06/16/2018       Status post fall  SAH--monitor neuro exam  Temporal bone fracture--pain control  Acute respiratory failure--continue mechanical vent support; wean as able  Hypoalbuminemia/moderate protein calorie malnutrition--continue tube feeds  Elevated LFTs--monitor labs  Electrolyte imbalance (hypokalemia/hypophosphatemia/hypomagnesemia)--correct as able  Staph in sputum--start vanco--await final cultures  DVT risk--PCDs/Start Lovenox    Patient is at risk for neurologic/respiratory/metabolic deterioration requires ongoing ICU care    Ginny Patel MD, FACS  6/19/2018  11:16 AM      Critical care time exclusive of teaching and procedures = 39 minutes     NOTE: This report was transcribed using voice recognition software. Every effort was made to ensure accuracy; however, inadvertent computerized transcription errors may be present.

## 2018-06-19 NOTE — PROGRESS NOTES
Surgical Intensive Care Unit   Daily Progress Note     Date of admission: 6/16/2018    Reason for ICU: fall from standing, SAH, temporal bone fx    Pertinent Hospital Course Events:   6/16 admitted to SICU for [facility], became extremely agitated throughout the day, intubated for airway protection, RIJ CL placed, thiamine, folate started  6/17, febrile, tolerated PS trial  6/8 remains febrile, pancx    Overnight Events:   Continues to have periods of agitation,remains febrile, with increased tracheal secretions    Subjective: intubated and sedated    Physical Exam:   /71   Pulse 88   Temp 101.2 °F (38.4 °C) (Core)   Resp 18   Ht 6' 2\" (1.88 m)   Wt 183 lb 14.4 oz (83.4 kg)   SpO2 95%   BMI 23.61 kg/m²      CONSTITUTIONAL: intubated  NEURO: intubated and sedated, not following commands  HEENT: PERRL, 3 mm  NECK aspen collar in place  LUNGS: coarse BS  CARDIOVASCULAR: RRR, no mumur  ABDOMEN: soft, non distended, no gaurding  MUSCULOSKELETAL: intermittently MAEx4    ASSESSMENT / PLAN:     Assessment/Plan:       · Neuro:      s.p fall while standing, SAH, L displaced mastoid fx, temporal bone fx  new R inf frontal lobe contusion on 2nd CT, repeat decrease SAH/frontal lobe, small subdural, pansinusitis  Hx ETOH abuse/use,on thiamine, folate, cont  Monitor neuro status    Neurosurgery following, no interventions    Keppra for seizure prophylaxis  Patient became extremely agitated, given ativan, haldol and phenobarb loading dose, intubated and sedated on propofol with ativan, haldol prn-cont      · CV:   No acute issues   Monitor hemodynamics      · Pulm: Intubated 6/16 for airway protection, on ventilator PS 8/5 40%  Monitor RR & SpO2    Bronchodilators  Daily CXR- worsening RLL PNA      · GI:   No acute issues.     On TF    Monitor bowel function  Phenergan prn    · Renal:   Hypokalemia,hypomagnesemia, will replace  Monitor electrolytes & replace as needed    Monitor urine output    · ID:   RLL PNA  Remains

## 2018-06-19 NOTE — PLAN OF CARE
Problem: Nutrition  Goal: Optimal nutrition therapy  Outcome: Ongoing  Nutrition Problem: Inadequate oral intake  Nutritional Goals:  Tolerate TF at goal rate

## 2018-06-19 NOTE — FLOWSHEET NOTE
Patient continues to pull at lines and tubes, education provided to patient. No evidence of learning at this time. Will continue to monitor need for restraints.

## 2018-06-19 NOTE — FLOWSHEET NOTE
Attempts to pull at lines and tubes when not restrained. Currently vented and sedated. Education and re direction ineffective at this time as no evidence of learning. Bilateral soft wrist restraints continue for safety.

## 2018-06-20 ENCOUNTER — APPOINTMENT (OUTPATIENT)
Dept: GENERAL RADIOLOGY | Age: 48
DRG: 055 | End: 2018-06-20
Payer: COMMERCIAL

## 2018-06-20 LAB
AADO2: 148.1 MMHG
ALBUMIN SERPL-MCNC: 2.8 G/DL (ref 3.5–5.2)
ALP BLD-CCNC: 145 U/L (ref 40–129)
ALT SERPL-CCNC: 62 U/L (ref 0–40)
ANION GAP SERPL CALCULATED.3IONS-SCNC: 10 MMOL/L (ref 7–16)
AST SERPL-CCNC: 30 U/L (ref 0–39)
B.E.: 6.4 MMOL/L (ref -3–3)
BASOPHILS ABSOLUTE: 0.03 E9/L (ref 0–0.2)
BASOPHILS RELATIVE PERCENT: 0.2 % (ref 0–2)
BILIRUB SERPL-MCNC: 0.5 MG/DL (ref 0–1.2)
BUN BLDV-MCNC: 14 MG/DL (ref 6–20)
CALCIUM IONIZED: 1.26 MMOL/L (ref 1.15–1.33)
CALCIUM SERPL-MCNC: 8.6 MG/DL (ref 8.6–10.2)
CHLORIDE BLD-SCNC: 102 MMOL/L (ref 98–107)
CO2: 33 MMOL/L (ref 22–29)
COHB: 0.3 % (ref 0–1.5)
CREAT SERPL-MCNC: 1.1 MG/DL (ref 0.7–1.2)
CRITICAL: ABNORMAL
CULTURE, RESPIRATORY: ABNORMAL
CULTURE, RESPIRATORY: ABNORMAL
DATE ANALYZED: ABNORMAL
DATE OF COLLECTION: ABNORMAL
EOSINOPHILS ABSOLUTE: 0.09 E9/L (ref 0.05–0.5)
EOSINOPHILS RELATIVE PERCENT: 0.7 % (ref 0–6)
FIO2: 40 %
GFR AFRICAN AMERICAN: >60
GFR NON-AFRICAN AMERICAN: >60 ML/MIN/1.73
GLUCOSE BLD-MCNC: 242 MG/DL (ref 74–109)
HCO3: 31.7 MMOL/L (ref 22–26)
HCT VFR BLD CALC: 35.7 % (ref 37–54)
HEMOGLOBIN: 11.3 G/DL (ref 12.5–16.5)
HHB: 5.6 % (ref 0–5)
IMMATURE GRANULOCYTES #: 0.12 E9/L
IMMATURE GRANULOCYTES %: 0.9 % (ref 0–5)
LAB: ABNORMAL
LYMPHOCYTES ABSOLUTE: 1.82 E9/L (ref 1.5–4)
LYMPHOCYTES RELATIVE PERCENT: 13.2 % (ref 20–42)
Lab: 440
MAGNESIUM: 1.7 MG/DL (ref 1.6–2.6)
MCH RBC QN AUTO: 27.4 PG (ref 26–35)
MCHC RBC AUTO-ENTMCNC: 31.7 % (ref 32–34.5)
MCV RBC AUTO: 86.7 FL (ref 80–99.9)
METER GLUCOSE: 190 MG/DL (ref 70–110)
METER GLUCOSE: 211 MG/DL (ref 70–110)
METER GLUCOSE: 230 MG/DL (ref 70–110)
METER GLUCOSE: 238 MG/DL (ref 70–110)
METER GLUCOSE: 238 MG/DL (ref 70–110)
METER GLUCOSE: 267 MG/DL (ref 70–110)
METER GLUCOSE: 283 MG/DL (ref 70–110)
METHB: 0.2 % (ref 0–1.5)
MODE: ABNORMAL
MONOCYTES ABSOLUTE: 1.44 E9/L (ref 0.1–0.95)
MONOCYTES RELATIVE PERCENT: 10.5 % (ref 2–12)
NEUTROPHILS ABSOLUTE: 10.27 E9/L (ref 1.8–7.3)
NEUTROPHILS RELATIVE PERCENT: 74.5 % (ref 43–80)
O2 CONTENT: 16.4 ML/DL
O2 SATURATION: 94.4 % (ref 92–98.5)
O2HB: 93.9 % (ref 94–97)
OPERATOR ID: 2067
ORGANISM: ABNORMAL
PATIENT TEMP: 37 C
PCO2: 48.4 MMHG (ref 35–45)
PDW BLD-RTO: 14 FL (ref 11.5–15)
PEEP/CPAP: 8 CMH?O
PFO2: 1.79 MMHG/%
PH BLOOD GAS: 7.43 (ref 7.35–7.45)
PHOSPHORUS: 3.6 MG/DL (ref 2.5–4.5)
PLATELET # BLD: 188 E9/L (ref 130–450)
PMV BLD AUTO: 9.4 FL (ref 7–12)
PO2: 71.4 MMHG (ref 60–100)
POTASSIUM REFLEX MAGNESIUM: 4 MMOL/L (ref 3.5–5)
PS: 8 CMH20
RBC # BLD: 4.12 E12/L (ref 3.8–5.8)
RI(T): 207 %
SMEAR, RESPIRATORY: ABNORMAL
SODIUM BLD-SCNC: 145 MMOL/L (ref 132–146)
SOURCE, BLOOD GAS: ABNORMAL
THB: 12.4 G/DL (ref 11.5–16.5)
TIME ANALYZED: 457
TOTAL PROTEIN: 6.6 G/DL (ref 6.4–8.3)
WBC # BLD: 13.8 E9/L (ref 4.5–11.5)

## 2018-06-20 PROCEDURE — 6360000002 HC RX W HCPCS: Performed by: SURGERY

## 2018-06-20 PROCEDURE — 85025 COMPLETE CBC W/AUTO DIFF WBC: CPT

## 2018-06-20 PROCEDURE — 6360000002 HC RX W HCPCS: Performed by: STUDENT IN AN ORGANIZED HEALTH CARE EDUCATION/TRAINING PROGRAM

## 2018-06-20 PROCEDURE — 6370000000 HC RX 637 (ALT 250 FOR IP): Performed by: STUDENT IN AN ORGANIZED HEALTH CARE EDUCATION/TRAINING PROGRAM

## 2018-06-20 PROCEDURE — 2000000000 HC ICU R&B

## 2018-06-20 PROCEDURE — 83735 ASSAY OF MAGNESIUM: CPT

## 2018-06-20 PROCEDURE — 71045 X-RAY EXAM CHEST 1 VIEW: CPT

## 2018-06-20 PROCEDURE — 2580000003 HC RX 258: Performed by: STUDENT IN AN ORGANIZED HEALTH CARE EDUCATION/TRAINING PROGRAM

## 2018-06-20 PROCEDURE — 36415 COLL VENOUS BLD VENIPUNCTURE: CPT

## 2018-06-20 PROCEDURE — 82962 GLUCOSE BLOOD TEST: CPT

## 2018-06-20 PROCEDURE — 6370000000 HC RX 637 (ALT 250 FOR IP): Performed by: SURGERY

## 2018-06-20 PROCEDURE — 82805 BLOOD GASES W/O2 SATURATION: CPT

## 2018-06-20 PROCEDURE — 84100 ASSAY OF PHOSPHORUS: CPT

## 2018-06-20 PROCEDURE — 80053 COMPREHEN METABOLIC PANEL: CPT

## 2018-06-20 PROCEDURE — 94003 VENT MGMT INPAT SUBQ DAY: CPT

## 2018-06-20 PROCEDURE — 94640 AIRWAY INHALATION TREATMENT: CPT

## 2018-06-20 PROCEDURE — 99291 CRITICAL CARE FIRST HOUR: CPT | Performed by: SURGERY

## 2018-06-20 PROCEDURE — 82330 ASSAY OF CALCIUM: CPT

## 2018-06-20 RX ORDER — MAGNESIUM SULFATE IN WATER 40 MG/ML
2 INJECTION, SOLUTION INTRAVENOUS ONCE
Status: COMPLETED | OUTPATIENT
Start: 2018-06-20 | End: 2018-06-20

## 2018-06-20 RX ORDER — INSULIN GLARGINE 100 [IU]/ML
35 INJECTION, SOLUTION SUBCUTANEOUS 2 TIMES DAILY
Status: DISCONTINUED | OUTPATIENT
Start: 2018-06-20 | End: 2018-06-21

## 2018-06-20 RX ORDER — METHYL SALICYLATE
OIL (ML) MISCELLANEOUS PRN
Status: DISCONTINUED | OUTPATIENT
Start: 2018-06-20 | End: 2018-06-25

## 2018-06-20 RX ORDER — BISACODYL 10 MG
10 SUPPOSITORY, RECTAL RECTAL ONCE
Status: COMPLETED | OUTPATIENT
Start: 2018-06-20 | End: 2018-06-20

## 2018-06-20 RX ORDER — BISACODYL 10 MG
10 SUPPOSITORY, RECTAL RECTAL
Status: ACTIVE | OUTPATIENT
Start: 2018-06-20 | End: 2018-06-20

## 2018-06-20 RX ADMIN — INSULIN LISPRO 9 UNITS: 100 INJECTION, SOLUTION INTRAVENOUS; SUBCUTANEOUS at 16:11

## 2018-06-20 RX ADMIN — VANCOMYCIN HYDROCHLORIDE 1500 MG: 10 INJECTION, POWDER, LYOPHILIZED, FOR SOLUTION INTRAVENOUS at 00:30

## 2018-06-20 RX ADMIN — IPRATROPIUM BROMIDE AND ALBUTEROL SULFATE 1 AMPULE: 2.5; .5 SOLUTION RESPIRATORY (INHALATION) at 15:40

## 2018-06-20 RX ADMIN — Medication 10 ML: at 08:23

## 2018-06-20 RX ADMIN — BISACODYL 10 MG: 10 SUPPOSITORY RECTAL at 11:32

## 2018-06-20 RX ADMIN — INSULIN LISPRO 9 UNITS: 100 INJECTION, SOLUTION INTRAVENOUS; SUBCUTANEOUS at 11:37

## 2018-06-20 RX ADMIN — INSULIN LISPRO 6 UNITS: 100 INJECTION, SOLUTION INTRAVENOUS; SUBCUTANEOUS at 23:38

## 2018-06-20 RX ADMIN — POTASSIUM & SODIUM PHOSPHATES POWDER PACK 280-160-250 MG 250 MG: 280-160-250 PACK at 16:09

## 2018-06-20 RX ADMIN — LORAZEPAM 1 MG: 2 INJECTION INTRAMUSCULAR; INTRAVENOUS at 06:15

## 2018-06-20 RX ADMIN — INSULIN LISPRO 6 UNITS: 100 INJECTION, SOLUTION INTRAVENOUS; SUBCUTANEOUS at 08:15

## 2018-06-20 RX ADMIN — LEVETIRACETAM 500 MG: 100 SOLUTION ORAL at 08:20

## 2018-06-20 RX ADMIN — INSULIN LISPRO 6 UNITS: 100 INJECTION, SOLUTION INTRAVENOUS; SUBCUTANEOUS at 04:33

## 2018-06-20 RX ADMIN — OXYCODONE HYDROCHLORIDE 5 MG: 5 SOLUTION ORAL at 16:09

## 2018-06-20 RX ADMIN — MAGNESIUM SULFATE HEPTAHYDRATE 2 G: 40 INJECTION, SOLUTION INTRAVENOUS at 09:12

## 2018-06-20 RX ADMIN — ACETAMINOPHEN 650 MG: 650 SOLUTION ORAL at 20:24

## 2018-06-20 RX ADMIN — INSULIN GLARGINE 35 UNITS: 100 INJECTION, SOLUTION SUBCUTANEOUS at 09:12

## 2018-06-20 RX ADMIN — Medication 100 MG: at 08:22

## 2018-06-20 RX ADMIN — POTASSIUM CHLORIDE 40 MEQ: 20 SOLUTION ORAL at 08:17

## 2018-06-20 RX ADMIN — INSULIN LISPRO 6 UNITS: 100 INJECTION, SOLUTION INTRAVENOUS; SUBCUTANEOUS at 20:03

## 2018-06-20 RX ADMIN — POTASSIUM & SODIUM PHOSPHATES POWDER PACK 280-160-250 MG 250 MG: 280-160-250 PACK at 20:01

## 2018-06-20 RX ADMIN — FOLIC ACID 1 MG: 1 TABLET ORAL at 08:22

## 2018-06-20 RX ADMIN — IPRATROPIUM BROMIDE AND ALBUTEROL SULFATE 1 AMPULE: 2.5; .5 SOLUTION RESPIRATORY (INHALATION) at 11:21

## 2018-06-20 RX ADMIN — IPRATROPIUM BROMIDE AND ALBUTEROL SULFATE 1 AMPULE: 2.5; .5 SOLUTION RESPIRATORY (INHALATION) at 19:56

## 2018-06-20 RX ADMIN — PANTOPRAZOLE SODIUM 40 MG: 40 GRANULE, DELAYED RELEASE ORAL at 06:15

## 2018-06-20 RX ADMIN — ACETAMINOPHEN 650 MG: 650 SOLUTION ORAL at 08:17

## 2018-06-20 RX ADMIN — Medication 10 ML: at 20:02

## 2018-06-20 RX ADMIN — POTASSIUM & SODIUM PHOSPHATES POWDER PACK 280-160-250 MG 500 MG: 280-160-250 PACK at 08:21

## 2018-06-20 RX ADMIN — DOCUSATE SODIUM 100 MG: 50 LIQUID ORAL at 20:01

## 2018-06-20 RX ADMIN — OXYCODONE HYDROCHLORIDE 5 MG: 5 SOLUTION ORAL at 11:32

## 2018-06-20 RX ADMIN — POTASSIUM & SODIUM PHOSPHATES POWDER PACK 280-160-250 MG 250 MG: 280-160-250 PACK at 13:26

## 2018-06-20 RX ADMIN — ACETAMINOPHEN 650 MG: 650 SOLUTION ORAL at 00:30

## 2018-06-20 RX ADMIN — PROPOFOL 30 MCG/KG/MIN: 10 INJECTION, EMULSION INTRAVENOUS at 04:40

## 2018-06-20 RX ADMIN — ACETAMINOPHEN 650 MG: 650 SOLUTION ORAL at 14:43

## 2018-06-20 RX ADMIN — LORAZEPAM 1 MG: 2 INJECTION INTRAMUSCULAR; INTRAVENOUS at 23:35

## 2018-06-20 RX ADMIN — OXYCODONE HYDROCHLORIDE 5 MG: 5 SOLUTION ORAL at 04:33

## 2018-06-20 RX ADMIN — ENOXAPARIN SODIUM 30 MG: 30 INJECTION SUBCUTANEOUS at 20:02

## 2018-06-20 RX ADMIN — LEVETIRACETAM 500 MG: 100 SOLUTION ORAL at 20:01

## 2018-06-20 RX ADMIN — CEFAZOLIN SODIUM 2 G: 2 SOLUTION INTRAVENOUS at 11:33

## 2018-06-20 RX ADMIN — INSULIN LISPRO 3 UNITS: 100 INJECTION, SOLUTION INTRAVENOUS; SUBCUTANEOUS at 00:29

## 2018-06-20 RX ADMIN — CEFAZOLIN SODIUM 2 G: 2 SOLUTION INTRAVENOUS at 20:02

## 2018-06-20 RX ADMIN — PROPOFOL 19.77 MCG/KG/MIN: 10 INJECTION, EMULSION INTRAVENOUS at 10:45

## 2018-06-20 RX ADMIN — INSULIN GLARGINE 35 UNITS: 100 INJECTION, SOLUTION SUBCUTANEOUS at 20:03

## 2018-06-20 RX ADMIN — OXYCODONE HYDROCHLORIDE 5 MG: 5 SOLUTION ORAL at 08:18

## 2018-06-20 RX ADMIN — DOCUSATE SODIUM 100 MG: 50 LIQUID ORAL at 09:15

## 2018-06-20 RX ADMIN — CHLORHEXIDINE GLUCONATE 15 ML: 1.2 RINSE ORAL at 08:24

## 2018-06-20 RX ADMIN — IPRATROPIUM BROMIDE AND ALBUTEROL SULFATE 1 AMPULE: 2.5; .5 SOLUTION RESPIRATORY (INHALATION) at 07:44

## 2018-06-20 RX ADMIN — Medication 5 ML: at 20:01

## 2018-06-20 RX ADMIN — ENOXAPARIN SODIUM 30 MG: 30 INJECTION SUBCUTANEOUS at 08:24

## 2018-06-20 RX ADMIN — OXYCODONE HYDROCHLORIDE 5 MG: 5 SOLUTION ORAL at 00:29

## 2018-06-20 RX ADMIN — PROPOFOL 19.77 MCG/KG/MIN: 10 INJECTION, EMULSION INTRAVENOUS at 20:01

## 2018-06-20 RX ADMIN — CHLORHEXIDINE GLUCONATE 15 ML: 1.2 RINSE ORAL at 20:01

## 2018-06-20 RX ADMIN — MINERAL OIL AND WHITE PETROLATUM: 150; 830 OINTMENT OPHTHALMIC at 20:02

## 2018-06-20 ASSESSMENT — PULMONARY FUNCTION TESTS
PIF_VALUE: 20
PIF_VALUE: 21
PIF_VALUE: 27
PIF_VALUE: 17
PIF_VALUE: 21
PIF_VALUE: 22
PIF_VALUE: 19
PIF_VALUE: 17

## 2018-06-20 ASSESSMENT — PAIN SCALES - GENERAL
PAINLEVEL_OUTOF10: 0
PAINLEVEL_OUTOF10: 2
PAINLEVEL_OUTOF10: 0
PAINLEVEL_OUTOF10: 3
PAINLEVEL_OUTOF10: 0

## 2018-06-20 NOTE — FLOWSHEET NOTE
Patient continues to pull at lines and tubes. Education provided, no evidence of learning at this time. Will continue to monitor need for restraints.

## 2018-06-20 NOTE — PROGRESS NOTES
P Quality Flow/Interdisciplinary Rounds Progress Note        Quality Flow Rounds held on June 20, 2018    Disciplines Attending:  , Nursing Unit Leadership, Pharmacist, ICU Physician Team.    Barbra Swanson was admitted on 6/16/2018  1:22 AM    Anticipated Discharge Date:  Expected Discharge Date: 06/20/18    Disposition:    Cl Score:  Cl Scale Score: 13    Readmission Risk              Risk of Unplanned Readmission:        12             Discussed patient goal for the day, patient clinical progression, and barriers to discharge. The following Goal(s) of the Day/Commitment(s) have been identified:  Wean vent and sedation as able. Continue ICU care.       Lm Mccartney  June 20, 2018

## 2018-06-20 NOTE — PROGRESS NOTES
DAILY VENTILATOR WEANING ASSESSMENT PERFORMED    P/FIO2 Ratio =  179        (<100= do not Wean)                  Cs =     57                     (<32= Instability)  Plat. Pressure = 16  MV =9  RSBI =    Instabilities:       Cardiovascular =       CNS =       Respiratory =       Metabolic =    Parameters  no    Wean per protocol  no    Ask Physician for a weaning plan yes    Additional Comments:     Performed by Em Butler RRT      Reference Table:    Cardiovascular     CNS      1. Mean BP less than or equal to 75   1. Neuromuscular blockade  2. Heart Rate greater than 130   2. RASS of -3, -4, -5  3. Myocardial Ischemia    3. RASS of +3, +4  4. Mechanical Assist Device    4. ICP greater than 15 or             Intracranial Hypertension         Respiratory      Metabolic  1. PEEP equal to or greater than 10cm/H20  1. Temp. (8hrs) less than 95 or > 103  2. Respiratory Rate greater than 35   2. WBC < 5000 or > 55961  3. Minute Volume greater than 15L  4. pH less than 7.30  5.  Deteriorating chest X-ray

## 2018-06-20 NOTE — FLOWSHEET NOTE
Patient continues to pull at lines and tubes. Education was provided to patient. No evidence of learning will continue to monitor need for restraints.

## 2018-06-21 ENCOUNTER — APPOINTMENT (OUTPATIENT)
Dept: GENERAL RADIOLOGY | Age: 48
DRG: 055 | End: 2018-06-21
Payer: COMMERCIAL

## 2018-06-21 LAB
AADO2: 124.3 MMHG
ALBUMIN SERPL-MCNC: 2.9 G/DL (ref 3.5–5.2)
ALP BLD-CCNC: 153 U/L (ref 40–129)
ALT SERPL-CCNC: 49 U/L (ref 0–40)
ANION GAP SERPL CALCULATED.3IONS-SCNC: 13 MMOL/L (ref 7–16)
AST SERPL-CCNC: 30 U/L (ref 0–39)
B.E.: 11 MMOL/L (ref -3–3)
BASOPHILS ABSOLUTE: 0.02 E9/L (ref 0–0.2)
BASOPHILS RELATIVE PERCENT: 0.2 % (ref 0–2)
BILIRUB SERPL-MCNC: 0.4 MG/DL (ref 0–1.2)
BUN BLDV-MCNC: 20 MG/DL (ref 6–20)
CALCIUM IONIZED: 1.3 MMOL/L (ref 1.15–1.33)
CALCIUM SERPL-MCNC: 9.1 MG/DL (ref 8.6–10.2)
CHLORIDE BLD-SCNC: 104 MMOL/L (ref 98–107)
CO2: 33 MMOL/L (ref 22–29)
COHB: 1.1 % (ref 0–1.5)
CREAT SERPL-MCNC: 1.1 MG/DL (ref 0.7–1.2)
CRITICAL: ABNORMAL
DATE ANALYZED: ABNORMAL
DATE OF COLLECTION: ABNORMAL
EOSINOPHILS ABSOLUTE: 0.13 E9/L (ref 0.05–0.5)
EOSINOPHILS RELATIVE PERCENT: 1.2 % (ref 0–6)
FIO2: 40 %
GFR AFRICAN AMERICAN: >60
GFR NON-AFRICAN AMERICAN: >60 ML/MIN/1.73
GLUCOSE BLD-MCNC: 230 MG/DL (ref 74–109)
HCO3: 35.9 MMOL/L (ref 22–26)
HCT VFR BLD CALC: 35.9 % (ref 37–54)
HEMOGLOBIN: 11.3 G/DL (ref 12.5–16.5)
HHB: 2.2 % (ref 0–5)
IMMATURE GRANULOCYTES #: 0.18 E9/L
IMMATURE GRANULOCYTES %: 1.6 % (ref 0–5)
LAB: ABNORMAL
LYMPHOCYTES ABSOLUTE: 1.58 E9/L (ref 1.5–4)
LYMPHOCYTES RELATIVE PERCENT: 14.3 % (ref 20–42)
Lab: 445
MAGNESIUM: 1.6 MG/DL (ref 1.6–2.6)
MCH RBC QN AUTO: 27.3 PG (ref 26–35)
MCHC RBC AUTO-ENTMCNC: 31.5 % (ref 32–34.5)
MCV RBC AUTO: 86.7 FL (ref 80–99.9)
METER GLUCOSE: 213 MG/DL (ref 70–110)
METER GLUCOSE: 214 MG/DL (ref 70–110)
METER GLUCOSE: 235 MG/DL (ref 70–110)
METER GLUCOSE: 251 MG/DL (ref 70–110)
METER GLUCOSE: 253 MG/DL (ref 70–110)
METER GLUCOSE: 262 MG/DL (ref 70–110)
METHB: 0.1 % (ref 0–1.5)
MODE: ABNORMAL
MONOCYTES ABSOLUTE: 1.15 E9/L (ref 0.1–0.95)
MONOCYTES RELATIVE PERCENT: 10.4 % (ref 2–12)
NEUTROPHILS ABSOLUTE: 7.97 E9/L (ref 1.8–7.3)
NEUTROPHILS RELATIVE PERCENT: 72.3 % (ref 43–80)
O2 SATURATION: 97.8 % (ref 92–98.5)
O2HB: 96.6 % (ref 94–97)
OPERATOR ID: ABNORMAL
PATIENT TEMP: 37 C
PCO2: 49 MMHG (ref 35–45)
PDW BLD-RTO: 14.1 FL (ref 11.5–15)
PEEP/CPAP: 8 CMH?O
PFO2: 2.34 MMHG/%
PH BLOOD GAS: 7.48 (ref 7.35–7.45)
PHOSPHORUS: 3.6 MG/DL (ref 2.5–4.5)
PLATELET # BLD: 214 E9/L (ref 130–450)
PMV BLD AUTO: 9.4 FL (ref 7–12)
PO2: 93.4 MMHG (ref 60–100)
POTASSIUM REFLEX MAGNESIUM: 3.9 MMOL/L (ref 3.5–5)
PS: 8 CMH20
RBC # BLD: 4.14 E12/L (ref 3.8–5.8)
RI(T): 133 %
SODIUM BLD-SCNC: 150 MMOL/L (ref 132–146)
SOURCE, BLOOD GAS: ABNORMAL
THB: 12.1 G/DL (ref 11.5–16.5)
TIME ANALYZED: 451
TOTAL PROTEIN: 6.9 G/DL (ref 6.4–8.3)
WBC # BLD: 11 E9/L (ref 4.5–11.5)

## 2018-06-21 PROCEDURE — 94003 VENT MGMT INPAT SUBQ DAY: CPT

## 2018-06-21 PROCEDURE — 6370000000 HC RX 637 (ALT 250 FOR IP): Performed by: SURGERY

## 2018-06-21 PROCEDURE — 6370000000 HC RX 637 (ALT 250 FOR IP): Performed by: STUDENT IN AN ORGANIZED HEALTH CARE EDUCATION/TRAINING PROGRAM

## 2018-06-21 PROCEDURE — 84100 ASSAY OF PHOSPHORUS: CPT

## 2018-06-21 PROCEDURE — 82962 GLUCOSE BLOOD TEST: CPT

## 2018-06-21 PROCEDURE — 82805 BLOOD GASES W/O2 SATURATION: CPT

## 2018-06-21 PROCEDURE — 94640 AIRWAY INHALATION TREATMENT: CPT

## 2018-06-21 PROCEDURE — 6360000002 HC RX W HCPCS: Performed by: SURGERY

## 2018-06-21 PROCEDURE — 83735 ASSAY OF MAGNESIUM: CPT

## 2018-06-21 PROCEDURE — 2500000003 HC RX 250 WO HCPCS: Performed by: SURGERY

## 2018-06-21 PROCEDURE — 99291 CRITICAL CARE FIRST HOUR: CPT | Performed by: SURGERY

## 2018-06-21 PROCEDURE — 2000000000 HC ICU R&B

## 2018-06-21 PROCEDURE — 2580000003 HC RX 258: Performed by: STUDENT IN AN ORGANIZED HEALTH CARE EDUCATION/TRAINING PROGRAM

## 2018-06-21 PROCEDURE — 6360000002 HC RX W HCPCS: Performed by: STUDENT IN AN ORGANIZED HEALTH CARE EDUCATION/TRAINING PROGRAM

## 2018-06-21 PROCEDURE — 71045 X-RAY EXAM CHEST 1 VIEW: CPT

## 2018-06-21 PROCEDURE — 80053 COMPREHEN METABOLIC PANEL: CPT

## 2018-06-21 PROCEDURE — 36415 COLL VENOUS BLD VENIPUNCTURE: CPT

## 2018-06-21 PROCEDURE — 85025 COMPLETE CBC W/AUTO DIFF WBC: CPT

## 2018-06-21 PROCEDURE — 2580000003 HC RX 258: Performed by: SURGERY

## 2018-06-21 PROCEDURE — 82330 ASSAY OF CALCIUM: CPT

## 2018-06-21 RX ORDER — LORAZEPAM 2 MG/ML
2 CONCENTRATE ORAL EVERY 4 HOURS
Status: DISCONTINUED | OUTPATIENT
Start: 2018-06-21 | End: 2018-06-21 | Stop reason: SDUPTHER

## 2018-06-21 RX ORDER — BISACODYL 10 MG
10 SUPPOSITORY, RECTAL RECTAL ONCE
Status: COMPLETED | OUTPATIENT
Start: 2018-06-21 | End: 2018-06-21

## 2018-06-21 RX ORDER — INSULIN GLARGINE 100 [IU]/ML
45 INJECTION, SOLUTION SUBCUTANEOUS 2 TIMES DAILY
Status: DISCONTINUED | OUTPATIENT
Start: 2018-06-21 | End: 2018-06-22

## 2018-06-21 RX ORDER — LORAZEPAM 1 MG/1
2 TABLET ORAL EVERY 4 HOURS
Status: DISCONTINUED | OUTPATIENT
Start: 2018-06-21 | End: 2018-06-25

## 2018-06-21 RX ORDER — BISACODYL 10 MG
10 SUPPOSITORY, RECTAL RECTAL
Status: ACTIVE | OUTPATIENT
Start: 2018-06-21 | End: 2018-06-21

## 2018-06-21 RX ORDER — MAGNESIUM SULFATE IN WATER 40 MG/ML
4 INJECTION, SOLUTION INTRAVENOUS ONCE
Status: COMPLETED | OUTPATIENT
Start: 2018-06-21 | End: 2018-06-21

## 2018-06-21 RX ADMIN — LEVETIRACETAM 500 MG: 100 SOLUTION ORAL at 08:07

## 2018-06-21 RX ADMIN — ACETAMINOPHEN 650 MG: 650 SOLUTION ORAL at 20:01

## 2018-06-21 RX ADMIN — INSULIN LISPRO 9 UNITS: 100 INJECTION, SOLUTION INTRAVENOUS; SUBCUTANEOUS at 07:51

## 2018-06-21 RX ADMIN — CHLORHEXIDINE GLUCONATE 15 ML: 1.2 RINSE ORAL at 08:07

## 2018-06-21 RX ADMIN — Medication 10 ML: at 20:02

## 2018-06-21 RX ADMIN — ACETAMINOPHEN 650 MG: 650 SOLUTION ORAL at 11:09

## 2018-06-21 RX ADMIN — DOCUSATE SODIUM 100 MG: 50 LIQUID ORAL at 08:07

## 2018-06-21 RX ADMIN — ACETAMINOPHEN 650 MG: 650 SOLUTION ORAL at 02:07

## 2018-06-21 RX ADMIN — INSULIN LISPRO 6 UNITS: 100 INJECTION, SOLUTION INTRAVENOUS; SUBCUTANEOUS at 15:56

## 2018-06-21 RX ADMIN — CEFAZOLIN SODIUM 2 G: 2 SOLUTION INTRAVENOUS at 04:58

## 2018-06-21 RX ADMIN — LORAZEPAM 2 MG: 1 TABLET ORAL at 20:01

## 2018-06-21 RX ADMIN — POTASSIUM & SODIUM PHOSPHATES POWDER PACK 280-160-250 MG 250 MG: 280-160-250 PACK at 15:57

## 2018-06-21 RX ADMIN — ACETAMINOPHEN 650 MG: 650 SOLUTION ORAL at 15:58

## 2018-06-21 RX ADMIN — INSULIN LISPRO 9 UNITS: 100 INJECTION, SOLUTION INTRAVENOUS; SUBCUTANEOUS at 23:59

## 2018-06-21 RX ADMIN — IPRATROPIUM BROMIDE AND ALBUTEROL SULFATE 1 AMPULE: 2.5; .5 SOLUTION RESPIRATORY (INHALATION) at 06:28

## 2018-06-21 RX ADMIN — OXYCODONE HYDROCHLORIDE 10 MG: 5 SOLUTION ORAL at 15:57

## 2018-06-21 RX ADMIN — PANTOPRAZOLE SODIUM 40 MG: 40 GRANULE, DELAYED RELEASE ORAL at 05:12

## 2018-06-21 RX ADMIN — IPRATROPIUM BROMIDE AND ALBUTEROL SULFATE 1 AMPULE: 2.5; .5 SOLUTION RESPIRATORY (INHALATION) at 16:15

## 2018-06-21 RX ADMIN — POTASSIUM & SODIUM PHOSPHATES POWDER PACK 280-160-250 MG 250 MG: 280-160-250 PACK at 14:00

## 2018-06-21 RX ADMIN — Medication 10 ML: at 08:51

## 2018-06-21 RX ADMIN — INSULIN GLARGINE 45 UNITS: 100 INJECTION, SOLUTION SUBCUTANEOUS at 20:04

## 2018-06-21 RX ADMIN — SODIUM CHLORIDE 0.2 MCG/KG/HR: 9 INJECTION, SOLUTION INTRAVENOUS at 10:49

## 2018-06-21 RX ADMIN — MAGNESIUM SULFATE IN WATER 4 G: 40 INJECTION, SOLUTION INTRAVENOUS at 10:49

## 2018-06-21 RX ADMIN — INSULIN LISPRO 6 UNITS: 100 INJECTION, SOLUTION INTRAVENOUS; SUBCUTANEOUS at 04:58

## 2018-06-21 RX ADMIN — IPRATROPIUM BROMIDE AND ALBUTEROL SULFATE 1 AMPULE: 2.5; .5 SOLUTION RESPIRATORY (INHALATION) at 19:57

## 2018-06-21 RX ADMIN — CHLORHEXIDINE GLUCONATE 15 ML: 1.2 RINSE ORAL at 20:01

## 2018-06-21 RX ADMIN — CEFAZOLIN SODIUM 2 G: 2 SOLUTION INTRAVENOUS at 20:02

## 2018-06-21 RX ADMIN — ENOXAPARIN SODIUM 30 MG: 30 INJECTION SUBCUTANEOUS at 08:51

## 2018-06-21 RX ADMIN — POTASSIUM & SODIUM PHOSPHATES POWDER PACK 280-160-250 MG 250 MG: 280-160-250 PACK at 20:03

## 2018-06-21 RX ADMIN — INSULIN GLARGINE 35 UNITS: 100 INJECTION, SOLUTION SUBCUTANEOUS at 08:08

## 2018-06-21 RX ADMIN — DOCUSATE SODIUM 100 MG: 50 LIQUID ORAL at 20:03

## 2018-06-21 RX ADMIN — INSULIN LISPRO 9 UNITS: 100 INJECTION, SOLUTION INTRAVENOUS; SUBCUTANEOUS at 11:47

## 2018-06-21 RX ADMIN — OXYCODONE HYDROCHLORIDE 10 MG: 5 SOLUTION ORAL at 07:38

## 2018-06-21 RX ADMIN — LORAZEPAM 2 MG: 1 TABLET ORAL at 11:02

## 2018-06-21 RX ADMIN — LORAZEPAM 2 MG: 1 TABLET ORAL at 23:54

## 2018-06-21 RX ADMIN — CEFAZOLIN SODIUM 2 G: 2 SOLUTION INTRAVENOUS at 11:48

## 2018-06-21 RX ADMIN — ENOXAPARIN SODIUM 30 MG: 30 INJECTION SUBCUTANEOUS at 20:01

## 2018-06-21 RX ADMIN — BISACODYL 10 MG: 10 SUPPOSITORY RECTAL at 10:49

## 2018-06-21 RX ADMIN — OXYCODONE HYDROCHLORIDE 10 MG: 5 SOLUTION ORAL at 20:21

## 2018-06-21 RX ADMIN — LEVETIRACETAM 500 MG: 100 SOLUTION ORAL at 20:01

## 2018-06-21 RX ADMIN — MINERAL OIL AND WHITE PETROLATUM: 150; 830 OINTMENT OPHTHALMIC at 04:58

## 2018-06-21 RX ADMIN — POTASSIUM & SODIUM PHOSPHATES POWDER PACK 280-160-250 MG 250 MG: 280-160-250 PACK at 08:07

## 2018-06-21 RX ADMIN — INSULIN LISPRO 6 UNITS: 100 INJECTION, SOLUTION INTRAVENOUS; SUBCUTANEOUS at 20:03

## 2018-06-21 RX ADMIN — PROPOFOL 25 MCG/KG/MIN: 10 INJECTION, EMULSION INTRAVENOUS at 04:58

## 2018-06-21 RX ADMIN — POTASSIUM CHLORIDE 40 MEQ: 20 SOLUTION ORAL at 08:06

## 2018-06-21 RX ADMIN — LORAZEPAM 2 MG: 1 TABLET ORAL at 15:19

## 2018-06-21 RX ADMIN — IPRATROPIUM BROMIDE AND ALBUTEROL SULFATE 1 AMPULE: 2.5; .5 SOLUTION RESPIRATORY (INHALATION) at 11:32

## 2018-06-21 ASSESSMENT — PULMONARY FUNCTION TESTS
PIF_VALUE: 16
PIF_VALUE: 14
PIF_VALUE: 16
PIF_VALUE: 13
PIF_VALUE: 14
PIF_VALUE: 13
PIF_VALUE: 17
PIF_VALUE: 14
PIF_VALUE: 17
PIF_VALUE: 22
PIF_VALUE: 17
PIF_VALUE: 14
PIF_VALUE: 17
PIF_VALUE: 13
PIF_VALUE: 17

## 2018-06-21 ASSESSMENT — PAIN SCALES - GENERAL
PAINLEVEL_OUTOF10: 0
PAINLEVEL_OUTOF10: 10
PAINLEVEL_OUTOF10: 2
PAINLEVEL_OUTOF10: 0

## 2018-06-21 NOTE — FLOWSHEET NOTE
While patient's arms are free he attempts to pull at ETT and other lines/tubes. Verbal redirection is unsuccessful at this time. Restraints continued for patient's safety and will continue to monitor.

## 2018-06-21 NOTE — PROGRESS NOTES
[facility] Quality Flow/Interdisciplinary Rounds Progress Note        Quality Flow Rounds held on June 21, 2018    Disciplines Attending:  , Nursing Unit Leadership, Pharmacist, ICU Physician Team.    Carl Cardenas was admitted on 6/16/2018  1:22 AM    Anticipated Discharge Date:  Expected Discharge Date: 06/20/18    Disposition:    Cl Score:  Cl Scale Score: 15    Readmission Risk              Risk of Unplanned Readmission:        12             Discussed patient goal for the day, patient clinical progression, and barriers to discharge. The following Goal(s) of the Day/Commitment(s) have been identified:  Continue to wean sedation and vent as able. Pain control.  Continue ICU care    Isrrael Lr  June 21, 2018

## 2018-06-21 NOTE — PROGRESS NOTES
[facility]  SURGICAL INTENSIVE CARE UNIT (SICU)  ATTENDING PHYSICIAN CRITICAL CARE PROGRESS NOTE     I have examined the patient, reviewed the record, and discussed the case with the APN/ resident. Please refer to the APN/ resident's note. I agree with the assessment and plan. I have reviewed all relevant labs and imaging data. The following summarizes my clinical findings and independent assessment. CC:  Critical care management after fall    Hospital Course/Overnight Events:  6/16--admitted after fall; intubated for agitation  6/18--no issues overnight  6/19--intermittent agitation  6/20--no new issues  6/21--nothing new overnight    Intubated/sedated/narcotized  Agitated off of sedation; purposeful  Not following commands  Heart: Regular  Lungs: Fairly clear bilaterally  Abdomen: Soft; bowel sounds active; nontender/nondistended  Skin: Warm/dry    Patient Active Problem List    Diagnosis Date Noted    Trauma 06/16/2018    Subarachnoid hemorrhage ([facility] [address] 06/16/2018    Closed fracture of temporal bone ([facility] [address] 06/16/2018       Status post fall  SAH--monitor neuro exam  Temporal bone fracture--pain control  Acute respiratory failure--continue mechanical vent support; wean as able  Hypoalbuminemia/moderate protein calorie malnutrition--continue tube feeds  Bowel regimen  Elevated LFTs--monitor labs  MSSA in sputum--Ancef #2  DVT risk--PCDs/Lovenox    Patient is at risk for neurologic/respiratory/metabolic deterioration requires ongoing ICU care    Levar Leal MD, FACS  6/21/2018  9:29 AM      Critical care time exclusive of teaching and procedures = 37 minutes     NOTE: This report was transcribed using voice recognition software. Every effort was made to ensure accuracy; however, inadvertent computerized transcription errors may be present.

## 2018-06-21 NOTE — PROGRESS NOTES
IMPRESSION:  Cerebral contusion with subarachnoid hemorrhage, left  frontotemporal lobe.  Blood in the left ear canal, possibly basal skull  fracture of temporal bone. Sedated & intubated at this time.   Reported to move all extremities when not sedated

## 2018-06-21 NOTE — PLAN OF CARE
Problem: Falls - Risk of:  Goal: Will remain free from falls  Will remain free from falls   Outcome: Met This Shift      Problem: Risk for Impaired Skin Integrity  Goal: Tissue integrity - skin and mucous membranes  Structural intactness and normal physiological function of skin and  mucous membranes. Outcome: Met This Shift      Problem: Restraint Use - Nonviolent/Non-Self-Destructive Behavior:  Goal: Absence of restraint indications  Absence of restraint indications   Outcome: Not Met This Shift    Goal: Absence of restraint-related injury  Absence of restraint-related injury   Outcome: Met This Shift      Comments: Plan of care discussed with patient/family. Patient/family incorporated into plan of care.

## 2018-06-21 NOTE — PROGRESS NOTES
Surgical Intensive Care Unit   Daily Progress Note     Date of admission: 6/16/2018    Reason for ICU: fall from standing, SAH, temporal bone fx    Pertinent Hospital Course Events:   6/16 admitted to SICU for [facility], became extremely agitated throughout the day, intubated for airway protection, RIJ CL placed, thiamine, folate started  6/17, febrile, tolerated PS trial  6/18 remains febrile, pancx  6/19 resp cx + staph aureus, vancomycin started  6/20 MSSA, vanc changed to ancef    Overnight Events:   None    Subjective:   Febrile. Resting comfortably this AM  Doesn't follow commands. Physical Exam:   /77   Pulse 92   Temp 101.6 °F (38.7 °C) (Core)   Resp 16   Ht 6' 2\" (1.88 m)   Wt 178 lb 14.4 oz (81.1 kg)   SpO2 98%   BMI 22.97 kg/m²      CONSTITUTIONAL: intubated  NEURO: intubated and sedated, not following commands  HEENT: PERRL, 3 mm  NECK aspen collar in place  LUNGS: coarse BS bilaterally  CARDIOVASCULAR: RRR, no mumur  ABDOMEN: soft, non distended, no gaurding  MUSCULOSKELETAL: intermittently MAEx4    ASSESSMENT / PLAN:     · Neuro:      s/p fall while standing, SAH, L displaced mastoid fx, temporal bone fx  new R inf frontal lobe contusion on 2nd CT, repeat decrease SAH/frontal lobe, small subdural, pansinusitis  Hx ETOH abuse/use,on thiamine, folate, cont  Monitor neuro status    Neurosurgery following, no interventions    Keppra for seizure prophylaxis        · CV:   No acute issues   Monitor hemodynamics      · Pulm: Intubated 6/16 for airway protection, on ventilator PS 8/8 40%  Monitor RR & SpO2    Bronchodilators  Daily CXR- worsening RLL PNA, on abx  Ventilator wean as able. · GI:   No acute issues.     On TF    Monitor bowel function      · Renal:   Monitor electrolytes & replace as needed    Monitor urine output    · ID:   RLL PNA  Remains febrile  Leukocytosis   Sputum cx + MSSA, vancomycin d/c'd, ancef started D2  BC pending     · Endocrine:     Hyperglycemia-cont, HX of diabetes. ISS, lantus started, will increase lantus to 35 units BID  Monitor BS     · MSK:   No acute issues   PT/OT when able      · Heme:   No acute issues    Monitor CBC      Bowel regimen: MOM  Pain control/Sedation:  Tylenol, Roxicodone  DVT prophylaxis: SCDs, lovenox     GI: Protonix. Glucose protocol:  ISS, lantus  Mouth/Eye care: As needed  Cifuentes: Keep in place for critical care monitoring of fluid balance.     Code status:    Full Code    Patient/Family update: as available    Disposition:  Continue current care    Electronically signed by Sukhi Washburn MD on 6/21/2018 at 6:06 AM

## 2018-06-22 ENCOUNTER — APPOINTMENT (OUTPATIENT)
Dept: GENERAL RADIOLOGY | Age: 48
DRG: 055 | End: 2018-06-22
Payer: COMMERCIAL

## 2018-06-22 LAB
AADO2: 147.3 MMHG
ALBUMIN SERPL-MCNC: 2.9 G/DL (ref 3.5–5.2)
ALP BLD-CCNC: 161 U/L (ref 40–129)
ALT SERPL-CCNC: 43 U/L (ref 0–40)
ANION GAP SERPL CALCULATED.3IONS-SCNC: 11 MMOL/L (ref 7–16)
AST SERPL-CCNC: 49 U/L (ref 0–39)
B.E.: 10.8 MMOL/L (ref -3–3)
BASOPHILS ABSOLUTE: 0.04 E9/L (ref 0–0.2)
BASOPHILS RELATIVE PERCENT: 0.4 % (ref 0–2)
BILIRUB SERPL-MCNC: 0.3 MG/DL (ref 0–1.2)
BUN BLDV-MCNC: 29 MG/DL (ref 6–20)
CALCIUM IONIZED: 1.27 MMOL/L (ref 1.15–1.33)
CALCIUM SERPL-MCNC: 9.3 MG/DL (ref 8.6–10.2)
CANNABINOIDS CONF, URINE: 84 NG/ML
CHLORIDE BLD-SCNC: 103 MMOL/L (ref 98–107)
CO2: 36 MMOL/L (ref 22–29)
COHB: 0 % (ref 0–1.5)
CREAT SERPL-MCNC: 1.2 MG/DL (ref 0.7–1.2)
CRITICAL: ABNORMAL
DATE ANALYZED: ABNORMAL
DATE OF COLLECTION: ABNORMAL
EOSINOPHILS ABSOLUTE: 0.14 E9/L (ref 0.05–0.5)
EOSINOPHILS RELATIVE PERCENT: 1.4 % (ref 0–6)
FIO2: 40 %
GFR AFRICAN AMERICAN: >60
GFR NON-AFRICAN AMERICAN: >60 ML/MIN/1.73
GLUCOSE BLD-MCNC: 178 MG/DL (ref 74–109)
HCO3: 35.9 MMOL/L (ref 22–26)
HCT VFR BLD CALC: 34.8 % (ref 37–54)
HEMOGLOBIN: 11.2 G/DL (ref 12.5–16.5)
HHB: 5.8 % (ref 0–5)
IMMATURE GRANULOCYTES #: 0.18 E9/L
IMMATURE GRANULOCYTES %: 1.8 % (ref 0–5)
LAB: ABNORMAL
LYMPHOCYTES ABSOLUTE: 1.97 E9/L (ref 1.5–4)
LYMPHOCYTES RELATIVE PERCENT: 19.8 % (ref 20–42)
Lab: 500
MAGNESIUM: 2 MG/DL (ref 1.6–2.6)
MCH RBC QN AUTO: 27.7 PG (ref 26–35)
MCHC RBC AUTO-ENTMCNC: 32.2 % (ref 32–34.5)
MCV RBC AUTO: 86.1 FL (ref 80–99.9)
METER GLUCOSE: 161 MG/DL (ref 70–110)
METER GLUCOSE: 169 MG/DL (ref 70–110)
METER GLUCOSE: 173 MG/DL (ref 70–110)
METER GLUCOSE: 197 MG/DL (ref 70–110)
METER GLUCOSE: 231 MG/DL (ref 70–110)
METHB: 0.8 % (ref 0–1.5)
MODE: ABNORMAL
MONOCYTES ABSOLUTE: 1.06 E9/L (ref 0.1–0.95)
MONOCYTES RELATIVE PERCENT: 10.7 % (ref 2–12)
NEUTROPHILS ABSOLUTE: 6.55 E9/L (ref 1.8–7.3)
NEUTROPHILS RELATIVE PERCENT: 65.9 % (ref 43–80)
O2 CONTENT: 17.5 ML/DL
O2 SATURATION: 94.2 % (ref 92–98.5)
O2HB: 93.4 % (ref 94–97)
OPERATOR ID: 1661
PATIENT TEMP: 37 C
PCO2: 49.6 MMHG (ref 35–45)
PDW BLD-RTO: 14.2 FL (ref 11.5–15)
PEEP/CPAP: 5 CMH?O
PFO2: 1.77 MMHG/%
PH BLOOD GAS: 7.48 (ref 7.35–7.45)
PHOSPHORUS: 3.2 MG/DL (ref 2.5–4.5)
PLATELET # BLD: 222 E9/L (ref 130–450)
PMV BLD AUTO: 9.2 FL (ref 7–12)
PO2: 70.9 MMHG (ref 60–100)
POTASSIUM REFLEX MAGNESIUM: 3.6 MMOL/L (ref 3.5–5)
PS: 8 CMH20
RBC # BLD: 4.04 E12/L (ref 3.8–5.8)
RI(T): 208 %
SODIUM BLD-SCNC: 150 MMOL/L (ref 132–146)
SOURCE, BLOOD GAS: ABNORMAL
THB: 13.3 G/DL (ref 11.5–16.5)
TIME ANALYZED: 515
TOTAL PROTEIN: 7.4 G/DL (ref 6.4–8.3)
WBC # BLD: 9.9 E9/L (ref 4.5–11.5)

## 2018-06-22 PROCEDURE — 2500000003 HC RX 250 WO HCPCS: Performed by: SURGERY

## 2018-06-22 PROCEDURE — 36415 COLL VENOUS BLD VENIPUNCTURE: CPT

## 2018-06-22 PROCEDURE — 6360000002 HC RX W HCPCS: Performed by: SURGERY

## 2018-06-22 PROCEDURE — 80053 COMPREHEN METABOLIC PANEL: CPT

## 2018-06-22 PROCEDURE — 82805 BLOOD GASES W/O2 SATURATION: CPT

## 2018-06-22 PROCEDURE — 87205 SMEAR GRAM STAIN: CPT

## 2018-06-22 PROCEDURE — 2580000003 HC RX 258: Performed by: STUDENT IN AN ORGANIZED HEALTH CARE EDUCATION/TRAINING PROGRAM

## 2018-06-22 PROCEDURE — 83735 ASSAY OF MAGNESIUM: CPT

## 2018-06-22 PROCEDURE — 84100 ASSAY OF PHOSPHORUS: CPT

## 2018-06-22 PROCEDURE — 6370000000 HC RX 637 (ALT 250 FOR IP): Performed by: STUDENT IN AN ORGANIZED HEALTH CARE EDUCATION/TRAINING PROGRAM

## 2018-06-22 PROCEDURE — 94640 AIRWAY INHALATION TREATMENT: CPT

## 2018-06-22 PROCEDURE — 82330 ASSAY OF CALCIUM: CPT

## 2018-06-22 PROCEDURE — 87040 BLOOD CULTURE FOR BACTERIA: CPT

## 2018-06-22 PROCEDURE — 82962 GLUCOSE BLOOD TEST: CPT

## 2018-06-22 PROCEDURE — 6370000000 HC RX 637 (ALT 250 FOR IP): Performed by: SURGERY

## 2018-06-22 PROCEDURE — 71045 X-RAY EXAM CHEST 1 VIEW: CPT

## 2018-06-22 PROCEDURE — 87088 URINE BACTERIA CULTURE: CPT

## 2018-06-22 PROCEDURE — 99291 CRITICAL CARE FIRST HOUR: CPT | Performed by: SURGERY

## 2018-06-22 PROCEDURE — 2580000003 HC RX 258: Performed by: SURGERY

## 2018-06-22 PROCEDURE — 85025 COMPLETE CBC W/AUTO DIFF WBC: CPT

## 2018-06-22 PROCEDURE — 87186 SC STD MICRODIL/AGAR DIL: CPT

## 2018-06-22 PROCEDURE — 6360000002 HC RX W HCPCS: Performed by: STUDENT IN AN ORGANIZED HEALTH CARE EDUCATION/TRAINING PROGRAM

## 2018-06-22 PROCEDURE — 87070 CULTURE OTHR SPECIMN AEROBIC: CPT

## 2018-06-22 PROCEDURE — 94003 VENT MGMT INPAT SUBQ DAY: CPT

## 2018-06-22 PROCEDURE — 2000000000 HC ICU R&B

## 2018-06-22 RX ORDER — INSULIN GLARGINE 100 [IU]/ML
55 INJECTION, SOLUTION SUBCUTANEOUS 2 TIMES DAILY
Status: DISCONTINUED | OUTPATIENT
Start: 2018-06-22 | End: 2018-06-25

## 2018-06-22 RX ADMIN — DOCUSATE SODIUM 100 MG: 50 LIQUID ORAL at 08:09

## 2018-06-22 RX ADMIN — IPRATROPIUM BROMIDE AND ALBUTEROL SULFATE 1 AMPULE: 2.5; .5 SOLUTION RESPIRATORY (INHALATION) at 12:01

## 2018-06-22 RX ADMIN — MINERAL OIL AND WHITE PETROLATUM: 150; 830 OINTMENT OPHTHALMIC at 08:08

## 2018-06-22 RX ADMIN — CEFAZOLIN SODIUM 2 G: 2 SOLUTION INTRAVENOUS at 20:02

## 2018-06-22 RX ADMIN — IPRATROPIUM BROMIDE AND ALBUTEROL SULFATE 1 AMPULE: 2.5; .5 SOLUTION RESPIRATORY (INHALATION) at 20:32

## 2018-06-22 RX ADMIN — SODIUM CHLORIDE 0.3 MCG/KG/HR: 9 INJECTION, SOLUTION INTRAVENOUS at 01:55

## 2018-06-22 RX ADMIN — LEVETIRACETAM 500 MG: 100 SOLUTION ORAL at 20:02

## 2018-06-22 RX ADMIN — ENOXAPARIN SODIUM 30 MG: 30 INJECTION SUBCUTANEOUS at 09:26

## 2018-06-22 RX ADMIN — PANTOPRAZOLE SODIUM 40 MG: 40 GRANULE, DELAYED RELEASE ORAL at 05:56

## 2018-06-22 RX ADMIN — LORAZEPAM 2 MG: 1 TABLET ORAL at 07:57

## 2018-06-22 RX ADMIN — DOCUSATE SODIUM 100 MG: 50 LIQUID ORAL at 20:04

## 2018-06-22 RX ADMIN — ACETAMINOPHEN 650 MG: 650 SOLUTION ORAL at 11:59

## 2018-06-22 RX ADMIN — INSULIN LISPRO 6 UNITS: 100 INJECTION, SOLUTION INTRAVENOUS; SUBCUTANEOUS at 20:07

## 2018-06-22 RX ADMIN — LEVETIRACETAM 500 MG: 100 SOLUTION ORAL at 08:09

## 2018-06-22 RX ADMIN — POTASSIUM & SODIUM PHOSPHATES POWDER PACK 280-160-250 MG 250 MG: 280-160-250 PACK at 16:14

## 2018-06-22 RX ADMIN — LORAZEPAM 2 MG: 1 TABLET ORAL at 16:14

## 2018-06-22 RX ADMIN — SODIUM CHLORIDE 0.3 MCG/KG/HR: 9 INJECTION, SOLUTION INTRAVENOUS at 20:03

## 2018-06-22 RX ADMIN — POTASSIUM & SODIUM PHOSPHATES POWDER PACK 280-160-250 MG 250 MG: 280-160-250 PACK at 13:35

## 2018-06-22 RX ADMIN — CEFAZOLIN SODIUM 2 G: 2 SOLUTION INTRAVENOUS at 11:59

## 2018-06-22 RX ADMIN — CHLORHEXIDINE GLUCONATE 15 ML: 1.2 RINSE ORAL at 20:03

## 2018-06-22 RX ADMIN — ENOXAPARIN SODIUM 30 MG: 30 INJECTION SUBCUTANEOUS at 20:03

## 2018-06-22 RX ADMIN — INSULIN LISPRO 3 UNITS: 100 INJECTION, SOLUTION INTRAVENOUS; SUBCUTANEOUS at 11:58

## 2018-06-22 RX ADMIN — MINERAL OIL AND WHITE PETROLATUM: 150; 830 OINTMENT OPHTHALMIC at 20:02

## 2018-06-22 RX ADMIN — OXYCODONE HYDROCHLORIDE 10 MG: 5 SOLUTION ORAL at 20:02

## 2018-06-22 RX ADMIN — INSULIN LISPRO 3 UNITS: 100 INJECTION, SOLUTION INTRAVENOUS; SUBCUTANEOUS at 16:14

## 2018-06-22 RX ADMIN — CEFAZOLIN SODIUM 2 G: 2 SOLUTION INTRAVENOUS at 04:22

## 2018-06-22 RX ADMIN — Medication 10 ML: at 20:03

## 2018-06-22 RX ADMIN — POTASSIUM & SODIUM PHOSPHATES POWDER PACK 280-160-250 MG 250 MG: 280-160-250 PACK at 08:09

## 2018-06-22 RX ADMIN — LORAZEPAM 2 MG: 1 TABLET ORAL at 20:02

## 2018-06-22 RX ADMIN — OXYCODONE HYDROCHLORIDE 10 MG: 5 SOLUTION ORAL at 04:22

## 2018-06-22 RX ADMIN — INSULIN GLARGINE 55 UNITS: 100 INJECTION, SOLUTION SUBCUTANEOUS at 07:48

## 2018-06-22 RX ADMIN — INSULIN GLARGINE 55 UNITS: 100 INJECTION, SOLUTION SUBCUTANEOUS at 20:06

## 2018-06-22 RX ADMIN — LORAZEPAM 2 MG: 1 TABLET ORAL at 11:59

## 2018-06-22 RX ADMIN — IPRATROPIUM BROMIDE AND ALBUTEROL SULFATE 1 AMPULE: 2.5; .5 SOLUTION RESPIRATORY (INHALATION) at 16:31

## 2018-06-22 RX ADMIN — OXYCODONE HYDROCHLORIDE 10 MG: 5 SOLUTION ORAL at 00:18

## 2018-06-22 RX ADMIN — ACETAMINOPHEN 650 MG: 650 SOLUTION ORAL at 00:16

## 2018-06-22 RX ADMIN — INSULIN LISPRO 3 UNITS: 100 INJECTION, SOLUTION INTRAVENOUS; SUBCUTANEOUS at 07:48

## 2018-06-22 RX ADMIN — CHLORHEXIDINE GLUCONATE 15 ML: 1.2 RINSE ORAL at 08:09

## 2018-06-22 RX ADMIN — POTASSIUM & SODIUM PHOSPHATES POWDER PACK 280-160-250 MG 250 MG: 280-160-250 PACK at 20:03

## 2018-06-22 RX ADMIN — POTASSIUM CHLORIDE 40 MEQ: 20 SOLUTION ORAL at 07:57

## 2018-06-22 RX ADMIN — ACETAMINOPHEN 650 MG: 650 SOLUTION ORAL at 20:26

## 2018-06-22 RX ADMIN — ACETAMINOPHEN 650 MG: 650 SOLUTION ORAL at 16:26

## 2018-06-22 RX ADMIN — LORAZEPAM 1 MG: 2 INJECTION INTRAMUSCULAR; INTRAVENOUS at 06:48

## 2018-06-22 RX ADMIN — LORAZEPAM 2 MG: 1 TABLET ORAL at 04:22

## 2018-06-22 RX ADMIN — Medication 5 ML: at 20:03

## 2018-06-22 RX ADMIN — Medication 10 ML: at 08:10

## 2018-06-22 RX ADMIN — IPRATROPIUM BROMIDE AND ALBUTEROL SULFATE 1 AMPULE: 2.5; .5 SOLUTION RESPIRATORY (INHALATION) at 08:39

## 2018-06-22 ASSESSMENT — PAIN SCALES - GENERAL
PAINLEVEL_OUTOF10: 2
PAINLEVEL_OUTOF10: 0
PAINLEVEL_OUTOF10: 2
PAINLEVEL_OUTOF10: 0
PAINLEVEL_OUTOF10: 2
PAINLEVEL_OUTOF10: 0
PAINLEVEL_OUTOF10: 2
PAINLEVEL_OUTOF10: 3

## 2018-06-22 ASSESSMENT — PULMONARY FUNCTION TESTS
PIF_VALUE: 13
PIF_VALUE: 14
PIF_VALUE: 16
PIF_VALUE: 13
PIF_VALUE: 14
PIF_VALUE: 15
PIF_VALUE: 14
PIF_VALUE: 13
PIF_VALUE: 14

## 2018-06-22 NOTE — PROGRESS NOTES
improving  Sputum cx + MSSA, vancomycin d/c'd, ancef started D3  BCs neg     · Endocrine:     Hyperglycemia-cont, HX of diabetes. ISS, lantus started, will increase lantus to 55 units BID  Monitor BS     · MSK:   No acute issues   PT/OT when able      · Heme:   No acute issues    Monitor CBC      Bowel regimen: MOM  Pain control/Sedation:  Tylenol, Roxicodone  DVT prophylaxis: SCDs, lovenox     GI: Protonix. Glucose protocol:  ISS, lantus  Mouth/Eye care: As needed  Cifuentes: Keep in place for critical care monitoring of fluid balance.     Code status:    Full Code    Patient/Family update: as available    Disposition:  Continue current care    Electronically signed by Nani Galeas DO on 6/22/2018 at 6:51 AM

## 2018-06-22 NOTE — CARE COORDINATION
Spoke with Pt's mom over phone, updated her on his status. Discussed transition of care plans. Discussed LTAC as previously mentioned to her. She is currently in [facility]. Her daughter and other family members will be visiting. She requested that I leave an LTAC list in room. She will be home Monday. Will follow up with her then  re: choices.

## 2018-06-22 NOTE — PROGRESS NOTES
mod BID: 1928kcals, 138gm pro)  · Additional Calories:  (propofol weaned at this time)  · Anthropometric Measures:  · Ht: 6' 2\" (188 cm)   · Current Body Wt: 177 lb (80.3 kg) (6/22 actual)  · Admission Body Wt: 185 lb (83.9 kg) (6/16 Bed Scale)  · Usual Body Wt:  (UTO)  · % Weight Change: noted 4.3% wt loss since adm, however questionable accuracy of bedscale vs fluid shifts ,     · Ideal Body Wt: 190 lb (86.2 kg), % Ideal Body 93%  · BMI Classification: BMI 18.5 - 24.9 Normal Weight  · Comparative Standards (Estimated Nutrition Needs):  · Estimated Daily Total Kcal:  (PS3B Tmax 39.1, MV 7.78; 2243)  · Estimated Daily Protein (g): 120-140 (1.5-1.8gm/kg CBW)    Estimated Intake vs Estimated Needs: Intake Less Than Needs    Nutrition Risk Level: Moderate    Nutrition Interventions:   Continued Inpatient Monitoring, Education not appropriate at this time, Coordination of Care    Nutrition Evaluation:   · Evaluation: Goals set   · Goals: Pt to tolerate TF at goal rate    · Monitoring: NPO Status, Supplement Intake, TF Intake, TF Tolerance, Gastric Residuals, Skin Integrity, Wound Healing, Fluid Balance, Ascites/Edema, Mental Status/Confusion, Weight, Comparative Standards, Pertinent Labs    See Adult Nutrition Doc Flowsheet for more detail.      Electronically signed by Michael Min RD, LD on 6/22/18 at 1:07 PM  Contact Number: [phone]

## 2018-06-23 ENCOUNTER — APPOINTMENT (OUTPATIENT)
Dept: GENERAL RADIOLOGY | Age: 48
DRG: 055 | End: 2018-06-23
Payer: COMMERCIAL

## 2018-06-23 PROBLEM — J95.821 ACUTE RESPIRATORY FAILURE FOLLOWING TRAUMA AND SURGERY (HCC): Status: ACTIVE | Noted: 2018-06-23

## 2018-06-23 PROBLEM — D62 ACUTE BLOOD LOSS ANEMIA: Status: ACTIVE | Noted: 2018-06-23
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
## 2018-06-23 PROBLEM — J15.211 PNEUMONIA OF LEFT LOWER LOBE DUE TO METHICILLIN SUSCEPTIBLE STAPHYLOCOCCUS AUREUS (MSSA) (HCC): Status: ACTIVE | Noted: 2018-06-23

## 2018-06-23 LAB
AADO2: 138.4 MMHG
ANION GAP SERPL CALCULATED.3IONS-SCNC: 10 MMOL/L (ref 7–16)
B.E.: 12.4 MMOL/L (ref -3–3)
BASOPHILS ABSOLUTE: 0.03 E9/L (ref 0–0.2)
BASOPHILS RELATIVE PERCENT: 0.3 % (ref 0–2)
BLOOD CULTURE, ROUTINE: NORMAL
BUN BLDV-MCNC: 35 MG/DL (ref 6–20)
CALCIUM SERPL-MCNC: 9 MG/DL (ref 8.6–10.2)
CHLORIDE BLD-SCNC: 100 MMOL/L (ref 98–107)
CO2: 37 MMOL/L (ref 22–29)
COHB: 1 % (ref 0–1.5)
CREAT SERPL-MCNC: 1.1 MG/DL (ref 0.7–1.2)
CRITICAL: ABNORMAL
CULTURE, BLOOD 2: NORMAL
DATE ANALYZED: ABNORMAL
DATE OF COLLECTION: ABNORMAL
EOSINOPHILS ABSOLUTE: 0.16 E9/L (ref 0.05–0.5)
EOSINOPHILS RELATIVE PERCENT: 1.5 % (ref 0–6)
FIO2: 40 %
GFR AFRICAN AMERICAN: >60
GFR NON-AFRICAN AMERICAN: >60 ML/MIN/1.73
GLUCOSE BLD-MCNC: 171 MG/DL (ref 74–109)
HCO3: 37.1 MMOL/L (ref 22–26)
HCT VFR BLD CALC: 34.1 % (ref 37–54)
HEMOGLOBIN: 10.7 G/DL (ref 12.5–16.5)
HHB: 4.1 % (ref 0–5)
IMMATURE GRANULOCYTES #: 0.18 E9/L
IMMATURE GRANULOCYTES %: 1.6 % (ref 0–5)
LAB: ABNORMAL
LYMPHOCYTES ABSOLUTE: 2.01 E9/L (ref 1.5–4)
LYMPHOCYTES RELATIVE PERCENT: 18.2 % (ref 20–42)
Lab: 436
MCH RBC QN AUTO: 27.5 PG (ref 26–35)
MCHC RBC AUTO-ENTMCNC: 31.4 % (ref 32–34.5)
MCV RBC AUTO: 87.7 FL (ref 80–99.9)
METER GLUCOSE: 159 MG/DL (ref 70–110)
METER GLUCOSE: 168 MG/DL (ref 70–110)
METER GLUCOSE: 172 MG/DL (ref 70–110)
METER GLUCOSE: 173 MG/DL (ref 70–110)
METER GLUCOSE: 174 MG/DL (ref 70–110)
METER GLUCOSE: 185 MG/DL (ref 70–110)
METER GLUCOSE: 209 MG/DL (ref 70–110)
METHB: 0.3 % (ref 0–1.5)
MODE: ABNORMAL
MONOCYTES ABSOLUTE: 1.07 E9/L (ref 0.1–0.95)
MONOCYTES RELATIVE PERCENT: 9.7 % (ref 2–12)
NEUTROPHILS ABSOLUTE: 7.58 E9/L (ref 1.8–7.3)
NEUTROPHILS RELATIVE PERCENT: 68.7 % (ref 43–80)
O2 SATURATION: 95.8 % (ref 92–98.5)
O2HB: 94.6 % (ref 94–97)
OPERATOR ID: ABNORMAL
PATIENT TEMP: 37 C
PCO2: 48.8 MMHG (ref 35–45)
PDW BLD-RTO: 14.3 FL (ref 11.5–15)
PEEP/CPAP: 5 CMH?O
PFO2: 1.96 MMHG/%
PH BLOOD GAS: 7.5 (ref 7.35–7.45)
PLATELET # BLD: 236 E9/L (ref 130–450)
PMV BLD AUTO: 9.8 FL (ref 7–12)
PO2: 78.3 MMHG (ref 60–100)
POTASSIUM SERPL-SCNC: 3.4 MMOL/L (ref 3.5–5)
PS: 8 CMH20
RBC # BLD: 3.89 E12/L (ref 3.8–5.8)
RI(T): 177 %
SODIUM BLD-SCNC: 147 MMOL/L (ref 132–146)
SOURCE, BLOOD GAS: ABNORMAL
THB: 11.2 G/DL (ref 11.5–16.5)
TIME ANALYZED: 443
WBC # BLD: 11 E9/L (ref 4.5–11.5)

## 2018-06-23 PROCEDURE — 6370000000 HC RX 637 (ALT 250 FOR IP): Performed by: STUDENT IN AN ORGANIZED HEALTH CARE EDUCATION/TRAINING PROGRAM

## 2018-06-23 PROCEDURE — 6360000002 HC RX W HCPCS: Performed by: STUDENT IN AN ORGANIZED HEALTH CARE EDUCATION/TRAINING PROGRAM

## 2018-06-23 PROCEDURE — 36415 COLL VENOUS BLD VENIPUNCTURE: CPT

## 2018-06-23 PROCEDURE — 6370000000 HC RX 637 (ALT 250 FOR IP): Performed by: SURGERY

## 2018-06-23 PROCEDURE — 2580000003 HC RX 258: Performed by: STUDENT IN AN ORGANIZED HEALTH CARE EDUCATION/TRAINING PROGRAM

## 2018-06-23 PROCEDURE — 76937 US GUIDE VASCULAR ACCESS: CPT

## 2018-06-23 PROCEDURE — 82805 BLOOD GASES W/O2 SATURATION: CPT

## 2018-06-23 PROCEDURE — 94003 VENT MGMT INPAT SUBQ DAY: CPT

## 2018-06-23 PROCEDURE — 94640 AIRWAY INHALATION TREATMENT: CPT

## 2018-06-23 PROCEDURE — C1751 CATH, INF, PER/CENT/MIDLINE: HCPCS

## 2018-06-23 PROCEDURE — 85025 COMPLETE CBC W/AUTO DIFF WBC: CPT

## 2018-06-23 PROCEDURE — 6360000002 HC RX W HCPCS: Performed by: SURGERY

## 2018-06-23 PROCEDURE — 82962 GLUCOSE BLOOD TEST: CPT

## 2018-06-23 PROCEDURE — 99291 CRITICAL CARE FIRST HOUR: CPT | Performed by: SURGERY

## 2018-06-23 PROCEDURE — 71045 X-RAY EXAM CHEST 1 VIEW: CPT

## 2018-06-23 PROCEDURE — 2000000000 HC ICU R&B

## 2018-06-23 PROCEDURE — 36569 INSJ PICC 5 YR+ W/O IMAGING: CPT

## 2018-06-23 PROCEDURE — 80048 BASIC METABOLIC PNL TOTAL CA: CPT

## 2018-06-23 RX ORDER — HEPARIN SODIUM (PORCINE) LOCK FLUSH IV SOLN 100 UNIT/ML 100 UNIT/ML
3 SOLUTION INTRAVENOUS EVERY 12 HOURS SCHEDULED
Status: DISCONTINUED | OUTPATIENT
Start: 2018-06-23 | End: 2018-07-10 | Stop reason: HOSPADM

## 2018-06-23 RX ORDER — HEPARIN SODIUM (PORCINE) LOCK FLUSH IV SOLN 100 UNIT/ML 100 UNIT/ML
3 SOLUTION INTRAVENOUS PRN
Status: DISCONTINUED | OUTPATIENT
Start: 2018-06-23 | End: 2018-07-10 | Stop reason: HOSPADM

## 2018-06-23 RX ORDER — SODIUM CHLORIDE 0.9 % (FLUSH) 0.9 %
10 SYRINGE (ML) INJECTION PRN
Status: DISCONTINUED | OUTPATIENT
Start: 2018-06-23 | End: 2018-07-10 | Stop reason: HOSPADM

## 2018-06-23 RX ORDER — OXYCODONE HCL 5 MG/5 ML
10 SOLUTION, ORAL ORAL EVERY 4 HOURS
Status: DISCONTINUED | OUTPATIENT
Start: 2018-06-23 | End: 2018-06-26

## 2018-06-23 RX ORDER — OXYCODONE HCL 5 MG/5 ML
5 SOLUTION, ORAL ORAL EVERY 4 HOURS
Status: DISCONTINUED | OUTPATIENT
Start: 2018-06-23 | End: 2018-06-23

## 2018-06-23 RX ORDER — LIDOCAINE HYDROCHLORIDE 10 MG/ML
5 INJECTION, SOLUTION EPIDURAL; INFILTRATION; INTRACAUDAL; PERINEURAL ONCE
Status: DISCONTINUED | OUTPATIENT
Start: 2018-06-23 | End: 2018-06-25

## 2018-06-23 RX ADMIN — POTASSIUM & SODIUM PHOSPHATES POWDER PACK 280-160-250 MG 250 MG: 280-160-250 PACK at 08:34

## 2018-06-23 RX ADMIN — LORAZEPAM 2 MG: 1 TABLET ORAL at 20:05

## 2018-06-23 RX ADMIN — INSULIN LISPRO 3 UNITS: 100 INJECTION, SOLUTION INTRAVENOUS; SUBCUTANEOUS at 00:18

## 2018-06-23 RX ADMIN — LORAZEPAM 2 MG: 1 TABLET ORAL at 00:10

## 2018-06-23 RX ADMIN — INSULIN LISPRO 3 UNITS: 100 INJECTION, SOLUTION INTRAVENOUS; SUBCUTANEOUS at 04:38

## 2018-06-23 RX ADMIN — OXYCODONE HYDROCHLORIDE 10 MG: 5 SOLUTION ORAL at 02:00

## 2018-06-23 RX ADMIN — CHLORHEXIDINE GLUCONATE 15 ML: 1.2 RINSE ORAL at 08:33

## 2018-06-23 RX ADMIN — POTASSIUM CHLORIDE 40 MEQ: 20 SOLUTION ORAL at 08:33

## 2018-06-23 RX ADMIN — LORAZEPAM 1 MG: 2 INJECTION INTRAMUSCULAR; INTRAVENOUS at 21:00

## 2018-06-23 RX ADMIN — INSULIN LISPRO 3 UNITS: 100 INJECTION, SOLUTION INTRAVENOUS; SUBCUTANEOUS at 11:59

## 2018-06-23 RX ADMIN — LORAZEPAM 2 MG: 1 TABLET ORAL at 08:19

## 2018-06-23 RX ADMIN — OXYCODONE HYDROCHLORIDE 10 MG: 5 SOLUTION ORAL at 15:00

## 2018-06-23 RX ADMIN — POTASSIUM & SODIUM PHOSPHATES POWDER PACK 280-160-250 MG 250 MG: 280-160-250 PACK at 16:42

## 2018-06-23 RX ADMIN — DOCUSATE SODIUM 100 MG: 50 LIQUID ORAL at 20:05

## 2018-06-23 RX ADMIN — INSULIN GLARGINE 55 UNITS: 100 INJECTION, SOLUTION SUBCUTANEOUS at 08:03

## 2018-06-23 RX ADMIN — CEFAZOLIN SODIUM 2 G: 2 SOLUTION INTRAVENOUS at 04:33

## 2018-06-23 RX ADMIN — INSULIN LISPRO 3 UNITS: 100 INJECTION, SOLUTION INTRAVENOUS; SUBCUTANEOUS at 08:04

## 2018-06-23 RX ADMIN — IPRATROPIUM BROMIDE AND ALBUTEROL SULFATE 1 AMPULE: 2.5; .5 SOLUTION RESPIRATORY (INHALATION) at 16:36

## 2018-06-23 RX ADMIN — CHLORHEXIDINE GLUCONATE 15 ML: 1.2 RINSE ORAL at 20:05

## 2018-06-23 RX ADMIN — INSULIN LISPRO 3 UNITS: 100 INJECTION, SOLUTION INTRAVENOUS; SUBCUTANEOUS at 23:39

## 2018-06-23 RX ADMIN — Medication 10 ML: at 20:04

## 2018-06-23 RX ADMIN — INSULIN LISPRO 3 UNITS: 100 INJECTION, SOLUTION INTRAVENOUS; SUBCUTANEOUS at 20:07

## 2018-06-23 RX ADMIN — ENOXAPARIN SODIUM 30 MG: 30 INJECTION SUBCUTANEOUS at 20:05

## 2018-06-23 RX ADMIN — CEFAZOLIN SODIUM 2 G: 2 SOLUTION INTRAVENOUS at 20:04

## 2018-06-23 RX ADMIN — IPRATROPIUM BROMIDE AND ALBUTEROL SULFATE 1 AMPULE: 2.5; .5 SOLUTION RESPIRATORY (INHALATION) at 11:57

## 2018-06-23 RX ADMIN — PANTOPRAZOLE SODIUM 40 MG: 40 GRANULE, DELAYED RELEASE ORAL at 05:35

## 2018-06-23 RX ADMIN — LORAZEPAM 2 MG: 1 TABLET ORAL at 23:28

## 2018-06-23 RX ADMIN — Medication 5 ML: at 20:05

## 2018-06-23 RX ADMIN — OXYCODONE HYDROCHLORIDE 10 MG: 5 SOLUTION ORAL at 19:01

## 2018-06-23 RX ADMIN — CEFAZOLIN SODIUM 2 G: 2 SOLUTION INTRAVENOUS at 11:42

## 2018-06-23 RX ADMIN — ACETAMINOPHEN 650 MG: 325 TABLET ORAL at 10:34

## 2018-06-23 RX ADMIN — ACETAMINOPHEN 650 MG: 650 SOLUTION ORAL at 02:00

## 2018-06-23 RX ADMIN — ENOXAPARIN SODIUM 30 MG: 30 INJECTION SUBCUTANEOUS at 08:34

## 2018-06-23 RX ADMIN — LORAZEPAM 2 MG: 1 TABLET ORAL at 04:33

## 2018-06-23 RX ADMIN — Medication 10 ML: at 08:33

## 2018-06-23 RX ADMIN — LEVETIRACETAM 500 MG: 100 SOLUTION ORAL at 08:34

## 2018-06-23 RX ADMIN — OXYCODONE HYDROCHLORIDE 10 MG: 5 SOLUTION ORAL at 11:46

## 2018-06-23 RX ADMIN — LORAZEPAM 2 MG: 1 TABLET ORAL at 11:42

## 2018-06-23 RX ADMIN — POTASSIUM & SODIUM PHOSPHATES POWDER PACK 280-160-250 MG 250 MG: 280-160-250 PACK at 12:31

## 2018-06-23 RX ADMIN — LORAZEPAM 2 MG: 1 TABLET ORAL at 16:44

## 2018-06-23 RX ADMIN — OXYCODONE HYDROCHLORIDE 10 MG: 5 SOLUTION ORAL at 09:25

## 2018-06-23 RX ADMIN — DOCUSATE SODIUM 100 MG: 50 LIQUID ORAL at 08:33

## 2018-06-23 RX ADMIN — POTASSIUM & SODIUM PHOSPHATES POWDER PACK 280-160-250 MG 250 MG: 280-160-250 PACK at 20:05

## 2018-06-23 RX ADMIN — OXYCODONE HYDROCHLORIDE 10 MG: 5 SOLUTION ORAL at 23:28

## 2018-06-23 RX ADMIN — SODIUM CHLORIDE, PRESERVATIVE FREE 300 UNITS: 5 INJECTION INTRAVENOUS at 20:04

## 2018-06-23 RX ADMIN — IPRATROPIUM BROMIDE AND ALBUTEROL SULFATE 1 AMPULE: 2.5; .5 SOLUTION RESPIRATORY (INHALATION) at 21:22

## 2018-06-23 RX ADMIN — OXYCODONE HYDROCHLORIDE 10 MG: 5 SOLUTION ORAL at 14:35

## 2018-06-23 RX ADMIN — INSULIN LISPRO 6 UNITS: 100 INJECTION, SOLUTION INTRAVENOUS; SUBCUTANEOUS at 16:40

## 2018-06-23 RX ADMIN — IPRATROPIUM BROMIDE AND ALBUTEROL SULFATE 1 AMPULE: 2.5; .5 SOLUTION RESPIRATORY (INHALATION) at 06:11

## 2018-06-23 RX ADMIN — INSULIN GLARGINE 55 UNITS: 100 INJECTION, SOLUTION SUBCUTANEOUS at 20:06

## 2018-06-23 ASSESSMENT — PAIN SCALES - GENERAL
PAINLEVEL_OUTOF10: 2
PAINLEVEL_OUTOF10: 0
PAINLEVEL_OUTOF10: 0
PAINLEVEL_OUTOF10: 10
PAINLEVEL_OUTOF10: 2
PAINLEVEL_OUTOF10: 2
PAINLEVEL_OUTOF10: 0
PAINLEVEL_OUTOF10: 0
PAINLEVEL_OUTOF10: 10

## 2018-06-23 ASSESSMENT — PULMONARY FUNCTION TESTS
PIF_VALUE: 14
PIF_VALUE: 14
PIF_VALUE: 15
PIF_VALUE: 18
PIF_VALUE: 15
PIF_VALUE: 16
PIF_VALUE: 16
PIF_VALUE: 14
PIF_VALUE: 14
PIF_VALUE: 17

## 2018-06-23 NOTE — PROGRESS NOTES
Sedated & intubated. IMPRESSION:  Cerebral contusion with subarachnoid hemorrhage, left  frontotemporal lobe.  Blood in the left ear canal, possibly basal skull  fracture of temporal bone. Sedated & intubated at this time.   Reported to move all extremities when not sedateden not sedated moving all extremities

## 2018-06-23 NOTE — FLOWSHEET NOTE
Pt agitated and restless kicking legs over bed siderails, reaching for ETT and NG and pulling at lines pt is unable to be redirected at this time restraints remain in place for pt safety

## 2018-06-23 NOTE — FLOWSHEET NOTE
Pt agitated/restless reaching for ETT/NG and pulling at lines pt is unable to be redirected at this time restraints remain in place for pt safety will continue to monitor

## 2018-06-23 NOTE — PROGRESS NOTES
Surgical Intensive Care Unit   Daily Progress Note     Date of admission: 6/16/2018    Reason for ICU: fall from standing, SAH, temporal bone fx    Pertinent Hospital Course Events:   6/16 admitted to SICU for [facility], became extremely agitated throughout the day, intubated for airway protection, RIJ CL placed, thiamine, folate started  6/17, febrile, tolerated PS trial  6/18 remains febrile, pancx  6/19 resp cx + staph aureus, vancomycin started  6/20 MSSA, vanc changed to ancef  6/21 propofol dc'd, precedex, ativan added, increased lantus    Overnight Events:   No acute events overnight, less agitated. Subjective: Intubated, sedated    Physical Exam:   /79   Pulse 77   Temp 101.7 °F (38.7 °C) (Axillary)   Resp 14   Ht 6' 2\" (1.88 m)   Wt 180 lb 4.8 oz (81.8 kg)   SpO2 98%   BMI 23.15 kg/m²      CONSTITUTIONAL: intubated  NEURO:  GCS 11T, follows commands  HEENT: PERRL, 3 mm  NECK aspen collar in place  LUNGS: coarse BS bilaterally  CARDIOVASCULAR: RRR, no mumur  ABDOMEN: soft, non distended, no gaurding  MUSCULOSKELETAL:  MAEx4, no edema or cyanosis    ASSESSMENT / PLAN:     · Neuro:      s/p fall while standing, SAH, L displaced mastoid fx, temporal bone fx  new R inf frontal lobe contusion on 2nd CT, repeat decrease SAH/frontal lobe, small subdural, pansinusitis  Hx ETOH abuse/use  Monitor neuro status    Neurosurgery following, no interventions    Keppra for seizure prophylaxis  precedex and ativan for sedation and agitation, wean off precedex today. Scheduled oxycodone for pain. Also attempt to wean ativan        · CV:   No acute issues   Monitor hemodynamics      · Pulm: Intubated 6/16 for airway protection, on ventilator PS 8/5 40%, tolerating  Monitor RR & SpO2    Bronchodilators  Daily CXR- RLL PNA, on abx  Ventilator wean as able  May need trach      · GI:   No acute issues.     On TF    Monitor bowel function  May need PEG      · Renal:   Monitor electrolytes & replace as needed

## 2018-06-24 ENCOUNTER — APPOINTMENT (OUTPATIENT)
Dept: GENERAL RADIOLOGY | Age: 48
DRG: 055 | End: 2018-06-24
Payer: COMMERCIAL

## 2018-06-24 LAB
AADO2: 140.1 MMHG
ANION GAP SERPL CALCULATED.3IONS-SCNC: 11 MMOL/L (ref 7–16)
B.E.: 11.6 MMOL/L (ref -3–3)
BASOPHILS ABSOLUTE: 0.04 E9/L (ref 0–0.2)
BASOPHILS RELATIVE PERCENT: 0.3 % (ref 0–2)
BUN BLDV-MCNC: 31 MG/DL (ref 6–20)
CALCIUM SERPL-MCNC: 9.1 MG/DL (ref 8.6–10.2)
CHLORIDE BLD-SCNC: 97 MMOL/L (ref 98–107)
CO2: 37 MMOL/L (ref 22–29)
COHB: 0.6 % (ref 0–1.5)
CREAT SERPL-MCNC: 1.1 MG/DL (ref 0.7–1.2)
CRITICAL: ABNORMAL
CULTURE, RESPIRATORY: ABNORMAL
CULTURE, RESPIRATORY: ABNORMAL
DATE ANALYZED: ABNORMAL
DATE OF COLLECTION: ABNORMAL
EOSINOPHILS ABSOLUTE: 0.31 E9/L (ref 0.05–0.5)
EOSINOPHILS RELATIVE PERCENT: 2.5 % (ref 0–6)
FIO2: 40 %
GFR AFRICAN AMERICAN: >60
GFR NON-AFRICAN AMERICAN: >60 ML/MIN/1.73
GLUCOSE BLD-MCNC: 165 MG/DL (ref 74–109)
HCO3: 36.7 MMOL/L (ref 22–26)
HCT VFR BLD CALC: 35.2 % (ref 37–54)
HEMOGLOBIN: 10.9 G/DL (ref 12.5–16.5)
HHB: 4.5 % (ref 0–5)
IMMATURE GRANULOCYTES #: 0.23 E9/L
IMMATURE GRANULOCYTES %: 1.8 % (ref 0–5)
LAB: ABNORMAL
LYMPHOCYTES ABSOLUTE: 2.35 E9/L (ref 1.5–4)
LYMPHOCYTES RELATIVE PERCENT: 18.8 % (ref 20–42)
Lab: 510
MCH RBC QN AUTO: 26.9 PG (ref 26–35)
MCHC RBC AUTO-ENTMCNC: 31 % (ref 32–34.5)
MCV RBC AUTO: 86.9 FL (ref 80–99.9)
METER GLUCOSE: 142 MG/DL (ref 70–110)
METER GLUCOSE: 143 MG/DL (ref 70–110)
METER GLUCOSE: 152 MG/DL (ref 70–110)
METER GLUCOSE: 159 MG/DL (ref 70–110)
METER GLUCOSE: 179 MG/DL (ref 70–110)
METER GLUCOSE: 185 MG/DL (ref 70–110)
METHB: 0.3 % (ref 0–1.5)
MODE: ABNORMAL
MONOCYTES ABSOLUTE: 1.06 E9/L (ref 0.1–0.95)
MONOCYTES RELATIVE PERCENT: 8.5 % (ref 2–12)
NEUTROPHILS ABSOLUTE: 8.5 E9/L (ref 1.8–7.3)
NEUTROPHILS RELATIVE PERCENT: 68.1 % (ref 43–80)
O2 CONTENT: 16.2 ML/DL
O2 SATURATION: 95.5 % (ref 92–98.5)
O2HB: 94.6 % (ref 94–97)
OPERATOR ID: 1874
ORGANISM: ABNORMAL
PATIENT TEMP: 37 C
PCO2: 49.9 MMHG (ref 35–45)
PDW BLD-RTO: 14.1 FL (ref 11.5–15)
PEEP/CPAP: 5 CMH?O
PFO2: 1.94 MMHG/%
PH BLOOD GAS: 7.48 (ref 7.35–7.45)
PLATELET # BLD: 261 E9/L (ref 130–450)
PMV BLD AUTO: 9.7 FL (ref 7–12)
PO2: 77.7 MMHG (ref 60–100)
POTASSIUM SERPL-SCNC: 3.3 MMOL/L (ref 3.5–5)
PS: 8 CMH20
RBC # BLD: 4.05 E12/L (ref 3.8–5.8)
RI(T): 180 %
SMEAR, RESPIRATORY: ABNORMAL
SODIUM BLD-SCNC: 145 MMOL/L (ref 132–146)
SOURCE, BLOOD GAS: ABNORMAL
THB: 12.1 G/DL (ref 11.5–16.5)
TIME ANALYZED: 517
URINE CULTURE, ROUTINE: NORMAL
WBC # BLD: 12.5 E9/L (ref 4.5–11.5)

## 2018-06-24 PROCEDURE — 85025 COMPLETE CBC W/AUTO DIFF WBC: CPT

## 2018-06-24 PROCEDURE — 6370000000 HC RX 637 (ALT 250 FOR IP): Performed by: STUDENT IN AN ORGANIZED HEALTH CARE EDUCATION/TRAINING PROGRAM

## 2018-06-24 PROCEDURE — 6370000000 HC RX 637 (ALT 250 FOR IP): Performed by: SURGERY

## 2018-06-24 PROCEDURE — 36592 COLLECT BLOOD FROM PICC: CPT

## 2018-06-24 PROCEDURE — 71045 X-RAY EXAM CHEST 1 VIEW: CPT

## 2018-06-24 PROCEDURE — 94640 AIRWAY INHALATION TREATMENT: CPT

## 2018-06-24 PROCEDURE — 6360000002 HC RX W HCPCS: Performed by: SURGERY

## 2018-06-24 PROCEDURE — 80048 BASIC METABOLIC PNL TOTAL CA: CPT

## 2018-06-24 PROCEDURE — 6360000002 HC RX W HCPCS: Performed by: STUDENT IN AN ORGANIZED HEALTH CARE EDUCATION/TRAINING PROGRAM

## 2018-06-24 PROCEDURE — 2000000000 HC ICU R&B

## 2018-06-24 PROCEDURE — 2580000003 HC RX 258: Performed by: STUDENT IN AN ORGANIZED HEALTH CARE EDUCATION/TRAINING PROGRAM

## 2018-06-24 PROCEDURE — 36415 COLL VENOUS BLD VENIPUNCTURE: CPT

## 2018-06-24 PROCEDURE — 99291 CRITICAL CARE FIRST HOUR: CPT | Performed by: SURGERY

## 2018-06-24 PROCEDURE — 82962 GLUCOSE BLOOD TEST: CPT

## 2018-06-24 PROCEDURE — 82805 BLOOD GASES W/O2 SATURATION: CPT

## 2018-06-24 PROCEDURE — 94003 VENT MGMT INPAT SUBQ DAY: CPT

## 2018-06-24 RX ORDER — POTASSIUM CHLORIDE 20MEQ/15ML
40 LIQUID (ML) ORAL 2 TIMES DAILY
Status: DISCONTINUED | OUTPATIENT
Start: 2018-06-24 | End: 2018-07-05

## 2018-06-24 RX ORDER — METHYLPREDNISOLONE SODIUM SUCCINATE 40 MG/ML
40 INJECTION, POWDER, LYOPHILIZED, FOR SOLUTION INTRAMUSCULAR; INTRAVENOUS ONCE
Status: COMPLETED | OUTPATIENT
Start: 2018-06-24 | End: 2018-06-24

## 2018-06-24 RX ORDER — DIMETHICONE, OXYBENZONE, AND PADIMATE O 2; 2.5; 6.6 G/100G; G/100G; G/100G
STICK TOPICAL PRN
Status: DISCONTINUED | OUTPATIENT
Start: 2018-06-24 | End: 2018-07-10 | Stop reason: HOSPADM

## 2018-06-24 RX ORDER — DEXAMETHASONE SODIUM PHOSPHATE 10 MG/ML
10 INJECTION INTRAMUSCULAR; INTRAVENOUS ONCE
Status: COMPLETED | OUTPATIENT
Start: 2018-06-25 | End: 2018-06-25

## 2018-06-24 RX ORDER — FUROSEMIDE 10 MG/ML
20 INJECTION INTRAMUSCULAR; INTRAVENOUS ONCE
Status: COMPLETED | OUTPATIENT
Start: 2018-06-24 | End: 2018-06-24

## 2018-06-24 RX ADMIN — DOCUSATE SODIUM 100 MG: 50 LIQUID ORAL at 07:36

## 2018-06-24 RX ADMIN — SODIUM CHLORIDE, PRESERVATIVE FREE 300 UNITS: 5 INJECTION INTRAVENOUS at 08:49

## 2018-06-24 RX ADMIN — POTASSIUM & SODIUM PHOSPHATES POWDER PACK 280-160-250 MG 250 MG: 280-160-250 PACK at 20:19

## 2018-06-24 RX ADMIN — ENOXAPARIN SODIUM 30 MG: 30 INJECTION SUBCUTANEOUS at 08:46

## 2018-06-24 RX ADMIN — LORAZEPAM 2 MG: 1 TABLET ORAL at 07:35

## 2018-06-24 RX ADMIN — ACETAMINOPHEN 650 MG: 650 SOLUTION ORAL at 16:22

## 2018-06-24 RX ADMIN — CEFAZOLIN SODIUM 2 G: 2 SOLUTION INTRAVENOUS at 11:44

## 2018-06-24 RX ADMIN — PANTOPRAZOLE SODIUM 40 MG: 40 GRANULE, DELAYED RELEASE ORAL at 06:27

## 2018-06-24 RX ADMIN — POTASSIUM CHLORIDE 40 MEQ: 20 SOLUTION ORAL at 20:18

## 2018-06-24 RX ADMIN — CHLORHEXIDINE GLUCONATE 15 ML: 1.2 RINSE ORAL at 20:18

## 2018-06-24 RX ADMIN — INSULIN GLARGINE 55 UNITS: 100 INJECTION, SOLUTION SUBCUTANEOUS at 20:18

## 2018-06-24 RX ADMIN — OXYCODONE HYDROCHLORIDE 10 MG: 5 SOLUTION ORAL at 07:35

## 2018-06-24 RX ADMIN — ACETAMINOPHEN 650 MG: 650 SOLUTION ORAL at 12:17

## 2018-06-24 RX ADMIN — ACETAMINOPHEN 650 MG: 650 SOLUTION ORAL at 23:45

## 2018-06-24 RX ADMIN — OXYCODONE HYDROCHLORIDE 10 MG: 5 SOLUTION ORAL at 20:19

## 2018-06-24 RX ADMIN — Medication 10 ML: at 07:42

## 2018-06-24 RX ADMIN — POTASSIUM & SODIUM PHOSPHATES POWDER PACK 280-160-250 MG 250 MG: 280-160-250 PACK at 07:36

## 2018-06-24 RX ADMIN — IPRATROPIUM BROMIDE AND ALBUTEROL SULFATE 1 AMPULE: 2.5; .5 SOLUTION RESPIRATORY (INHALATION) at 16:29

## 2018-06-24 RX ADMIN — CHLORHEXIDINE GLUCONATE 15 ML: 1.2 RINSE ORAL at 07:36

## 2018-06-24 RX ADMIN — METHYLPREDNISOLONE SODIUM SUCCINATE 40 MG: 40 INJECTION, POWDER, FOR SOLUTION INTRAMUSCULAR; INTRAVENOUS at 09:46

## 2018-06-24 RX ADMIN — ACETAMINOPHEN 650 MG: 650 SOLUTION ORAL at 00:51

## 2018-06-24 RX ADMIN — FUROSEMIDE 20 MG: 10 INJECTION, SOLUTION INTRAMUSCULAR; INTRAVENOUS at 13:07

## 2018-06-24 RX ADMIN — IPRATROPIUM BROMIDE AND ALBUTEROL SULFATE 1 AMPULE: 2.5; .5 SOLUTION RESPIRATORY (INHALATION) at 12:30

## 2018-06-24 RX ADMIN — LORAZEPAM 2 MG: 1 TABLET ORAL at 23:45

## 2018-06-24 RX ADMIN — SODIUM CHLORIDE, PRESERVATIVE FREE 300 UNITS: 5 INJECTION INTRAVENOUS at 20:18

## 2018-06-24 RX ADMIN — CEFAZOLIN SODIUM 2 G: 2 SOLUTION INTRAVENOUS at 20:19

## 2018-06-24 RX ADMIN — OXYCODONE HYDROCHLORIDE 10 MG: 5 SOLUTION ORAL at 23:45

## 2018-06-24 RX ADMIN — POTASSIUM CHLORIDE 40 MEQ: 20 SOLUTION ORAL at 07:36

## 2018-06-24 RX ADMIN — INSULIN LISPRO 3 UNITS: 100 INJECTION, SOLUTION INTRAVENOUS; SUBCUTANEOUS at 11:40

## 2018-06-24 RX ADMIN — INSULIN LISPRO 3 UNITS: 100 INJECTION, SOLUTION INTRAVENOUS; SUBCUTANEOUS at 23:46

## 2018-06-24 RX ADMIN — CEFAZOLIN SODIUM 2 G: 2 SOLUTION INTRAVENOUS at 04:25

## 2018-06-24 RX ADMIN — OXYCODONE HYDROCHLORIDE 10 MG: 5 SOLUTION ORAL at 03:08

## 2018-06-24 RX ADMIN — LORAZEPAM 1 MG: 2 INJECTION INTRAMUSCULAR; INTRAVENOUS at 09:16

## 2018-06-24 RX ADMIN — IPRATROPIUM BROMIDE AND ALBUTEROL SULFATE 1 AMPULE: 2.5; .5 SOLUTION RESPIRATORY (INHALATION) at 07:50

## 2018-06-24 RX ADMIN — LORAZEPAM 1 MG: 2 INJECTION INTRAMUSCULAR; INTRAVENOUS at 15:09

## 2018-06-24 RX ADMIN — ENOXAPARIN SODIUM 30 MG: 30 INJECTION SUBCUTANEOUS at 20:30

## 2018-06-24 RX ADMIN — DOCUSATE SODIUM 100 MG: 50 LIQUID ORAL at 23:45

## 2018-06-24 RX ADMIN — INSULIN LISPRO 3 UNITS: 100 INJECTION, SOLUTION INTRAVENOUS; SUBCUTANEOUS at 07:49

## 2018-06-24 RX ADMIN — OXYCODONE HYDROCHLORIDE 10 MG: 5 SOLUTION ORAL at 11:43

## 2018-06-24 RX ADMIN — POTASSIUM & SODIUM PHOSPHATES POWDER PACK 280-160-250 MG 250 MG: 280-160-250 PACK at 12:17

## 2018-06-24 RX ADMIN — LORAZEPAM 2 MG: 1 TABLET ORAL at 11:43

## 2018-06-24 RX ADMIN — INSULIN GLARGINE 55 UNITS: 100 INJECTION, SOLUTION SUBCUTANEOUS at 06:27

## 2018-06-24 RX ADMIN — ACETAMINOPHEN 650 MG: 650 SOLUTION ORAL at 07:36

## 2018-06-24 RX ADMIN — INSULIN LISPRO 3 UNITS: 100 INJECTION, SOLUTION INTRAVENOUS; SUBCUTANEOUS at 20:19

## 2018-06-24 RX ADMIN — INSULIN LISPRO 3 UNITS: 100 INJECTION, SOLUTION INTRAVENOUS; SUBCUTANEOUS at 04:23

## 2018-06-24 RX ADMIN — IPRATROPIUM BROMIDE AND ALBUTEROL SULFATE 1 AMPULE: 2.5; .5 SOLUTION RESPIRATORY (INHALATION) at 22:29

## 2018-06-24 RX ADMIN — OXYCODONE HYDROCHLORIDE 10 MG: 5 SOLUTION ORAL at 16:24

## 2018-06-24 RX ADMIN — INSULIN LISPRO 3 UNITS: 100 INJECTION, SOLUTION INTRAVENOUS; SUBCUTANEOUS at 16:30

## 2018-06-24 RX ADMIN — POTASSIUM & SODIUM PHOSPHATES POWDER PACK 280-160-250 MG 250 MG: 280-160-250 PACK at 16:23

## 2018-06-24 RX ADMIN — Medication 10 ML: at 20:18

## 2018-06-24 RX ADMIN — LORAZEPAM 2 MG: 1 TABLET ORAL at 20:20

## 2018-06-24 RX ADMIN — LORAZEPAM 2 MG: 1 TABLET ORAL at 16:23

## 2018-06-24 RX ADMIN — LORAZEPAM 2 MG: 1 TABLET ORAL at 04:27

## 2018-06-24 ASSESSMENT — PULMONARY FUNCTION TESTS
PIF_VALUE: 10
PIF_VALUE: 11
PIF_VALUE: 10
PIF_VALUE: 16
PIF_VALUE: 10
PIF_VALUE: 13
PIF_VALUE: 10
PIF_VALUE: 11
PIF_VALUE: 18
PIF_VALUE: 21

## 2018-06-24 ASSESSMENT — PAIN SCALES - GENERAL
PAINLEVEL_OUTOF10: 0
PAINLEVEL_OUTOF10: 6
PAINLEVEL_OUTOF10: 0
PAINLEVEL_OUTOF10: 6
PAINLEVEL_OUTOF10: 0

## 2018-06-24 NOTE — PROGRESS NOTES
Extubated  IMPRESSION:  Cerebral contusion with subarachnoid hemorrhage, left  frontotemporal lobe.  Blood in the left ear canal, possibly basal skull  fracture of temporal bone.   Moves all extremities

## 2018-06-24 NOTE — PROGRESS NOTES
Wean parameter done    VT= 483 mls  F= 18 B/M  V= 8.7 l/m  NIF= -41 cmH2O  VC= 1.62 L  RSBI = 37  Patient w/no leak.

## 2018-06-24 NOTE — PROGRESS NOTES
[facility]     Attending Physician Statement:   I have examined the patient, reviewed the record, and discussed the case with the ICU team. I agree with the assessment and plan for today with the corrections/additions detailed below.     CC: Ground-level fall, traumatic brain injury    Problems/Plans:  Traumatic brain injury with subarachnoid hemorrhage, basilar skull fracture  · Clinically improving  · Continue neuro checks  · Wean dexmedetomidine  · Continue oxycodone and lorazepam     Acute respiratory failure due to MSSA pneumonia and trauma  · Tolerating spontaneous breathing trial  · Extubate today  · Continue Ancef    Inability to feed/moderate protein calorie malnutrition  · Continue enteral nutrition with diabetic formula    Acute blood loss anemia   · Hemoglobin 10.7  · No active bleeding  · Monitor H&H    Fever  · T-max 101.9  · Continue Ancef  · Blood/urine cultures from 6/22/18 show no growth  · Sputum culture from 6/22/18 shows light staph aureus growth    VTE risk  · SCDs  · lovenox        Significant 24 hour events/new complaints:  6/16--admitted after fall; intubated for agitation  6/18--no issues overnight  6/19--intermittent agitation  6/20--no new issues  6/21--nothing new overnight  6/22--unchanged overnight  6/23/18remains intermittently restless and agitated with RASS -2 to +2; remains on pressure support ventilation   6/24/18no acute changes; remains on pressure support ventilation     Physical Exam:  Vitals:    06/24/18 0600 06/24/18 0700 06/24/18 0800 06/24/18 0803   BP: 134/75 (!) 144/94 (!) 155/96    Pulse: 81 100 106 124   Resp: 23 22 13 (!) 48   Temp: 100.6 °F (38.1 °C)  101.8 °F (38.8 °C)    TempSrc: Axillary  Core    SpO2: 99% 100% 100% 100%   Weight: 183 lb 14.4 oz (83.4 kg)      Height:          In: 2162 [I.V.:95; NG/GT:1967]  Out: 2132 [Urine:2132]   I/O this shift:  In: -   Out: 124 [Urine:124]     General:  Intermittent agitation and

## 2018-06-24 NOTE — FLOWSHEET NOTE
Patient attempts to reach for lines and tubes necessary for care. Very restless and agitated, unable to be redirected. Bilateral wrist restraints continued for patient safety. Will continue to monitor.

## 2018-06-24 NOTE — PLAN OF CARE
Problem: Risk for Impaired Skin Integrity  Goal: Tissue integrity - skin and mucous membranes  Structural intactness and normal physiological function of skin and  mucous membranes.    Outcome: Met This Shift      Problem: Restraint Use - Nonviolent/Non-Self-Destructive Behavior:  Goal: Absence of restraint indications  Absence of restraint indications   Outcome: Not Met This Shift    Goal: Absence of restraint-related injury  Absence of restraint-related injury   Outcome: Met This Shift

## 2018-06-24 NOTE — PROGRESS NOTES
PEG      · Renal:   Monitor electrolytes & replace as needed    Monitor urine output    · ID:   RLL PNA  Remains febrile  Leukocytosis improving  Sputum cx + MSSA, vancomycin d/c'd, ancef started D3  BCs neg, repeat cultures pending. · Endocrine:     Hyperglycemia-cont, HX of diabetes. ISS, lantus 55 units BID  Monitor BS     · MSK:   No acute issues   PT/OT when able      · Heme:   No acute issues    Monitor CBC      Bowel regimen: MOM, colace, senna  Pain control/Sedation:  Tylenol, Roxicodone, precidex (wean off), ativan  DVT prophylaxis: SCDs, lovenox     GI: Protonix. Glucose protocol:  ISS, lantus   Mouth/Eye care: As needed  Cifuentes: Keep in place for critical care monitoring of fluid balance.     Code status:    Full Code    Patient/Family update: as available    Disposition:  Continue current care, may need trach/peg    Electronically signed by Ani Roth MD on 6/24/2018 at 8:39 AM

## 2018-06-25 ENCOUNTER — APPOINTMENT (OUTPATIENT)
Dept: GENERAL RADIOLOGY | Age: 48
DRG: 055 | End: 2018-06-25
Payer: COMMERCIAL

## 2018-06-25 LAB
AADO2: 137.2 MMHG
ANION GAP SERPL CALCULATED.3IONS-SCNC: 12 MMOL/L (ref 7–16)
B.E.: 12.4 MMOL/L (ref -3–3)
BASOPHILS ABSOLUTE: 0.04 E9/L (ref 0–0.2)
BASOPHILS RELATIVE PERCENT: 0.3 % (ref 0–2)
BUN BLDV-MCNC: 31 MG/DL (ref 6–20)
CALCIUM SERPL-MCNC: 9.3 MG/DL (ref 8.6–10.2)
CHLORIDE BLD-SCNC: 93 MMOL/L (ref 98–107)
CO2: 35 MMOL/L (ref 22–29)
COHB: 1.4 % (ref 0–1.5)
CREAT SERPL-MCNC: 1 MG/DL (ref 0.7–1.2)
CRITICAL: ABNORMAL
DATE ANALYZED: ABNORMAL
DATE OF COLLECTION: ABNORMAL
EOSINOPHILS ABSOLUTE: 0.41 E9/L (ref 0.05–0.5)
EOSINOPHILS RELATIVE PERCENT: 3.1 % (ref 0–6)
FIO2: 40 %
GFR AFRICAN AMERICAN: >60
GFR NON-AFRICAN AMERICAN: >60 ML/MIN/1.73
GLUCOSE BLD-MCNC: 186 MG/DL (ref 74–109)
HCO3: 37 MMOL/L (ref 22–26)
HCT VFR BLD CALC: 34.3 % (ref 37–54)
HEMOGLOBIN: 11 G/DL (ref 12.5–16.5)
HHB: 3 % (ref 0–5)
IMMATURE GRANULOCYTES #: 0.18 E9/L
IMMATURE GRANULOCYTES %: 1.4 % (ref 0–5)
LAB: ABNORMAL
LYMPHOCYTES ABSOLUTE: 2.33 E9/L (ref 1.5–4)
LYMPHOCYTES RELATIVE PERCENT: 17.7 % (ref 20–42)
Lab: 450
MCH RBC QN AUTO: 27.6 PG (ref 26–35)
MCHC RBC AUTO-ENTMCNC: 32.1 % (ref 32–34.5)
MCV RBC AUTO: 86.2 FL (ref 80–99.9)
METER GLUCOSE: 153 MG/DL (ref 70–110)
METER GLUCOSE: 187 MG/DL (ref 70–110)
METER GLUCOSE: 207 MG/DL (ref 70–110)
METER GLUCOSE: 209 MG/DL (ref 70–110)
METER GLUCOSE: 270 MG/DL (ref 70–110)
METER GLUCOSE: 281 MG/DL (ref 70–110)
METHB: 0.1 % (ref 0–1.5)
MODE: ABNORMAL
MONOCYTES ABSOLUTE: 0.84 E9/L (ref 0.1–0.95)
MONOCYTES RELATIVE PERCENT: 6.4 % (ref 2–12)
NEUTROPHILS ABSOLUTE: 9.37 E9/L (ref 1.8–7.3)
NEUTROPHILS RELATIVE PERCENT: 71.1 % (ref 43–80)
O2 SATURATION: 97 % (ref 92–98.5)
O2HB: 95.5 % (ref 94–97)
OPERATOR ID: ABNORMAL
PATIENT TEMP: 37 C
PCO2: 47.5 MMHG (ref 35–45)
PDW BLD-RTO: 13.8 FL (ref 11.5–15)
PEEP/CPAP: 5 CMH?O
PFO2: 2.12 MMHG/%
PH BLOOD GAS: 7.51 (ref 7.35–7.45)
PLATELET # BLD: 287 E9/L (ref 130–450)
PMV BLD AUTO: 9.9 FL (ref 7–12)
PO2: 84.6 MMHG (ref 60–100)
POIKILOCYTES: ABNORMAL
POLYCHROMASIA: ABNORMAL
POTASSIUM SERPL-SCNC: 3.9 MMOL/L (ref 3.5–5)
PS: 5 CMH20
RBC # BLD: 3.98 E12/L (ref 3.8–5.8)
RI(T): 162 %
SODIUM BLD-SCNC: 140 MMOL/L (ref 132–146)
SOURCE, BLOOD GAS: ABNORMAL
SPHEROCYTES: ABNORMAL
THB: 11.9 G/DL (ref 11.5–16.5)
TIME ANALYZED: 505
WBC # BLD: 13.2 E9/L (ref 4.5–11.5)

## 2018-06-25 PROCEDURE — 6360000002 HC RX W HCPCS: Performed by: STUDENT IN AN ORGANIZED HEALTH CARE EDUCATION/TRAINING PROGRAM

## 2018-06-25 PROCEDURE — 71045 X-RAY EXAM CHEST 1 VIEW: CPT

## 2018-06-25 PROCEDURE — 6370000000 HC RX 637 (ALT 250 FOR IP): Performed by: STUDENT IN AN ORGANIZED HEALTH CARE EDUCATION/TRAINING PROGRAM

## 2018-06-25 PROCEDURE — 2580000003 HC RX 258: Performed by: STUDENT IN AN ORGANIZED HEALTH CARE EDUCATION/TRAINING PROGRAM

## 2018-06-25 PROCEDURE — 94799 UNLISTED PULMONARY SVC/PX: CPT

## 2018-06-25 PROCEDURE — 82805 BLOOD GASES W/O2 SATURATION: CPT

## 2018-06-25 PROCEDURE — 99291 CRITICAL CARE FIRST HOUR: CPT | Performed by: SURGERY

## 2018-06-25 PROCEDURE — 94640 AIRWAY INHALATION TREATMENT: CPT

## 2018-06-25 PROCEDURE — 2000000000 HC ICU R&B

## 2018-06-25 PROCEDURE — 94003 VENT MGMT INPAT SUBQ DAY: CPT

## 2018-06-25 PROCEDURE — 80048 BASIC METABOLIC PNL TOTAL CA: CPT

## 2018-06-25 PROCEDURE — 85025 COMPLETE CBC W/AUTO DIFF WBC: CPT

## 2018-06-25 PROCEDURE — 6360000002 HC RX W HCPCS: Performed by: SURGERY

## 2018-06-25 PROCEDURE — 36415 COLL VENOUS BLD VENIPUNCTURE: CPT

## 2018-06-25 PROCEDURE — 6370000000 HC RX 637 (ALT 250 FOR IP): Performed by: SURGERY

## 2018-06-25 PROCEDURE — 82962 GLUCOSE BLOOD TEST: CPT

## 2018-06-25 RX ORDER — INSULIN GLARGINE 100 [IU]/ML
60 INJECTION, SOLUTION SUBCUTANEOUS 2 TIMES DAILY
Status: DISCONTINUED | OUTPATIENT
Start: 2018-06-25 | End: 2018-06-27

## 2018-06-25 RX ORDER — DEXAMETHASONE SODIUM PHOSPHATE 10 MG/ML
10 INJECTION INTRAMUSCULAR; INTRAVENOUS EVERY 6 HOURS
Status: DISCONTINUED | OUTPATIENT
Start: 2018-06-25 | End: 2018-06-26

## 2018-06-25 RX ORDER — LORAZEPAM 1 MG/1
1 TABLET ORAL EVERY 4 HOURS
Status: DISCONTINUED | OUTPATIENT
Start: 2018-06-25 | End: 2018-06-26

## 2018-06-25 RX ORDER — PROPRANOLOL HYDROCHLORIDE 10 MG/1
10 TABLET ORAL 3 TIMES DAILY
Status: DISCONTINUED | OUTPATIENT
Start: 2018-06-25 | End: 2018-06-28

## 2018-06-25 RX ADMIN — DEXAMETHASONE SODIUM PHOSPHATE 10 MG: 10 INJECTION INTRAMUSCULAR; INTRAVENOUS at 22:07

## 2018-06-25 RX ADMIN — CEFAZOLIN SODIUM 2 G: 2 SOLUTION INTRAVENOUS at 19:57

## 2018-06-25 RX ADMIN — SODIUM CHLORIDE, PRESERVATIVE FREE 300 UNITS: 5 INJECTION INTRAVENOUS at 21:00

## 2018-06-25 RX ADMIN — LORAZEPAM 1 MG: 1 TABLET ORAL at 11:17

## 2018-06-25 RX ADMIN — ENOXAPARIN SODIUM 30 MG: 30 INJECTION SUBCUTANEOUS at 08:00

## 2018-06-25 RX ADMIN — LORAZEPAM 1 MG: 2 INJECTION INTRAMUSCULAR; INTRAVENOUS at 18:35

## 2018-06-25 RX ADMIN — IPRATROPIUM BROMIDE AND ALBUTEROL SULFATE 1 AMPULE: 2.5; .5 SOLUTION RESPIRATORY (INHALATION) at 11:46

## 2018-06-25 RX ADMIN — IPRATROPIUM BROMIDE AND ALBUTEROL SULFATE 1 AMPULE: 2.5; .5 SOLUTION RESPIRATORY (INHALATION) at 16:53

## 2018-06-25 RX ADMIN — OXYCODONE HYDROCHLORIDE 10 MG: 5 SOLUTION ORAL at 04:31

## 2018-06-25 RX ADMIN — SODIUM CHLORIDE, PRESERVATIVE FREE 300 UNITS: 5 INJECTION INTRAVENOUS at 07:53

## 2018-06-25 RX ADMIN — OXYCODONE HYDROCHLORIDE 10 MG: 5 SOLUTION ORAL at 15:16

## 2018-06-25 RX ADMIN — DOCUSATE SODIUM 100 MG: 50 LIQUID ORAL at 08:00

## 2018-06-25 RX ADMIN — INSULIN LISPRO 6 UNITS: 100 INJECTION, SOLUTION INTRAVENOUS; SUBCUTANEOUS at 07:49

## 2018-06-25 RX ADMIN — POTASSIUM & SODIUM PHOSPHATES POWDER PACK 280-160-250 MG 250 MG: 280-160-250 PACK at 08:00

## 2018-06-25 RX ADMIN — CHLORHEXIDINE GLUCONATE 15 ML: 1.2 RINSE ORAL at 08:00

## 2018-06-25 RX ADMIN — ENOXAPARIN SODIUM 30 MG: 30 INJECTION SUBCUTANEOUS at 20:30

## 2018-06-25 RX ADMIN — OXYCODONE HYDROCHLORIDE 10 MG: 5 SOLUTION ORAL at 19:58

## 2018-06-25 RX ADMIN — OXYCODONE HYDROCHLORIDE 10 MG: 5 SOLUTION ORAL at 23:29

## 2018-06-25 RX ADMIN — Medication 10 ML: at 04:31

## 2018-06-25 RX ADMIN — INSULIN LISPRO 11 UNITS: 100 INJECTION, SOLUTION INTRAVENOUS; SUBCUTANEOUS at 11:22

## 2018-06-25 RX ADMIN — CEFAZOLIN SODIUM 2 G: 2 SOLUTION INTRAVENOUS at 11:17

## 2018-06-25 RX ADMIN — INSULIN LISPRO 3 UNITS: 100 INJECTION, SOLUTION INTRAVENOUS; SUBCUTANEOUS at 04:31

## 2018-06-25 RX ADMIN — CEFAZOLIN SODIUM 2 G: 2 SOLUTION INTRAVENOUS at 04:32

## 2018-06-25 RX ADMIN — LORAZEPAM 1 MG: 1 TABLET ORAL at 15:30

## 2018-06-25 RX ADMIN — LORAZEPAM 1 MG: 2 INJECTION INTRAMUSCULAR; INTRAVENOUS at 04:30

## 2018-06-25 RX ADMIN — DEXAMETHASONE SODIUM PHOSPHATE 10 MG: 10 INJECTION INTRAMUSCULAR; INTRAVENOUS at 04:31

## 2018-06-25 RX ADMIN — Medication 10 ML: at 20:30

## 2018-06-25 RX ADMIN — INSULIN GLARGINE 55 UNITS: 100 INJECTION, SOLUTION SUBCUTANEOUS at 07:50

## 2018-06-25 RX ADMIN — INSULIN LISPRO 11 UNITS: 100 INJECTION, SOLUTION INTRAVENOUS; SUBCUTANEOUS at 15:30

## 2018-06-25 RX ADMIN — POTASSIUM CHLORIDE 40 MEQ: 20 SOLUTION ORAL at 08:00

## 2018-06-25 RX ADMIN — INSULIN GLARGINE 60 UNITS: 100 INJECTION, SOLUTION SUBCUTANEOUS at 20:06

## 2018-06-25 RX ADMIN — INSULIN LISPRO 9 UNITS: 100 INJECTION, SOLUTION INTRAVENOUS; SUBCUTANEOUS at 20:06

## 2018-06-25 RX ADMIN — POTASSIUM CHLORIDE 40 MEQ: 20 SOLUTION ORAL at 22:07

## 2018-06-25 RX ADMIN — OXYCODONE HYDROCHLORIDE 10 MG: 5 SOLUTION ORAL at 07:28

## 2018-06-25 RX ADMIN — DEXAMETHASONE SODIUM PHOSPHATE 10 MG: 10 INJECTION INTRAMUSCULAR; INTRAVENOUS at 09:46

## 2018-06-25 RX ADMIN — LORAZEPAM 1 MG: 1 TABLET ORAL at 19:58

## 2018-06-25 RX ADMIN — INSULIN LISPRO 5 UNITS: 100 INJECTION, SOLUTION INTRAVENOUS; SUBCUTANEOUS at 23:30

## 2018-06-25 RX ADMIN — PROPRANOLOL HYDROCHLORIDE 10 MG: 10 TABLET ORAL at 19:58

## 2018-06-25 RX ADMIN — LORAZEPAM 1 MG: 1 TABLET ORAL at 23:30

## 2018-06-25 RX ADMIN — CHLORHEXIDINE GLUCONATE 15 ML: 1.2 RINSE ORAL at 20:30

## 2018-06-25 RX ADMIN — DEXAMETHASONE SODIUM PHOSPHATE 10 MG: 10 INJECTION INTRAMUSCULAR; INTRAVENOUS at 15:17

## 2018-06-25 RX ADMIN — Medication 5 ML: at 19:57

## 2018-06-25 RX ADMIN — IPRATROPIUM BROMIDE AND ALBUTEROL SULFATE 1 AMPULE: 2.5; .5 SOLUTION RESPIRATORY (INHALATION) at 08:48

## 2018-06-25 RX ADMIN — ACETAMINOPHEN 650 MG: 650 SOLUTION ORAL at 05:53

## 2018-06-25 RX ADMIN — Medication 10 ML: at 07:52

## 2018-06-25 RX ADMIN — DOCUSATE SODIUM 100 MG: 50 LIQUID ORAL at 19:57

## 2018-06-25 RX ADMIN — LORAZEPAM 2 MG: 1 TABLET ORAL at 07:51

## 2018-06-25 RX ADMIN — PROPRANOLOL HYDROCHLORIDE 10 MG: 10 TABLET ORAL at 13:26

## 2018-06-25 RX ADMIN — OXYCODONE HYDROCHLORIDE 10 MG: 5 SOLUTION ORAL at 11:17

## 2018-06-25 RX ADMIN — PANTOPRAZOLE SODIUM 40 MG: 40 GRANULE, DELAYED RELEASE ORAL at 06:02

## 2018-06-25 ASSESSMENT — PULMONARY FUNCTION TESTS
PIF_VALUE: 17
PIF_VALUE: 11
PIF_VALUE: 20
PIF_VALUE: 22
PIF_VALUE: 17
PIF_VALUE: 18

## 2018-06-25 ASSESSMENT — PAIN SCALES - GENERAL
PAINLEVEL_OUTOF10: 4
PAINLEVEL_OUTOF10: 0
PAINLEVEL_OUTOF10: 0
PAINLEVEL_OUTOF10: 4
PAINLEVEL_OUTOF10: 6
PAINLEVEL_OUTOF10: 0
PAINLEVEL_OUTOF10: 0
PAINLEVEL_OUTOF10: 6
PAINLEVEL_OUTOF10: 0

## 2018-06-25 NOTE — PLAN OF CARE
Problem: Falls - Risk of:  Goal: Will remain free from falls  Will remain free from falls   Outcome: Met This Shift      Problem: Restraint Use - Nonviolent/Non-Self-Destructive Behavior:  Goal: Absence of restraint indications  Absence of restraint indications   Outcome: Not Met This Shift    Goal: Absence of restraint-related injury  Absence of restraint-related injury   Outcome: Met This Shift

## 2018-06-25 NOTE — PROGRESS NOTES
Surgical Intensive Care Unit   Daily Progress Note     Date of admission: 6/16/2018    Reason for ICU: fall from standing, SAH, temporal bone fx    Pertinent Hospital Course Events:   6/16 admitted to SICU for [facility], became extremely agitated throughout the day, intubated for airway protection, RIJ CL placed, thiamine, folate started  6/17, febrile, tolerated PS trial  6/18 remains febrile, pancx  6/19 resp cx + staph aureus, vancomycin started  6/20 MSSA, vanc changed to ancef  6/21 propofol dc'd, precedex, ativan added, increased lantus  6/24 following commands, no cuff leak, decadron 40 mg given    Overnight Events:   No acute events overnight, less agitated, following commands, remains febrile    Subjective: Intubated, sedated    Physical Exam:   /82   Pulse 102   Temp 99.7 °F (37.6 °C) (Core)   Resp 12   Ht 6' 2\" (1.88 m)   Wt 182 lb 4.8 oz (82.7 kg)   SpO2 98%   BMI 23.41 kg/m²      CONSTITUTIONAL: intubated  NEURO:  GCS 11T, follows commands  HEENT: PERRL, 3 mm  NECK aspen collar in place  LUNGS: coarse BS b/l  CARDIOVASCULAR: RRR, no mumur  ABDOMEN: soft, non distended, no gaurding  MUSCULOSKELETAL:  Moves all extremities equally, no edema or cyanosis    ASSESSMENT / PLAN:     · Neuro:      s/p fall while standing, SAH, L displaced mastoid fx, temporal bone fx, completed keppra  new R inf frontal lobe contusion on 2nd CT, repeat decrease SAH/frontal lobe, small subdural, pansinusitis  Hx ETOH abuse/use  Monitor neuro status, improving    Neurosurgery following, no interventions    Keppra for seizure prophylaxis  Off precedex  Ativan for agitation, decrease to 1 mg  Scheduled oxycodone for pain. · CV:   Tachycardia, will start propranolol  Monitor hemodynamics      · Pulm:    Intubated 6/16 for airway protection, on ventilator PS 5/5 40%, con to tolerate  Monitor RR & SpO2    Bronchodilators  Daily CXR- RLL PNA, on abx-ancef  Ventilator wean as able 5/5  Unable to extubate yesterday, no

## 2018-06-25 NOTE — PROGRESS NOTES
[facility]  ATTENDING PHYSICIAN PROGRESS NOTE     I have examined the patient, reviewed the record, and discussed the case with the APN/  Resident. I have reviewed all relevant labs and imaging data. Please refer to the  APN/ resident's note. I agree with the  assessment and plan with the following corrections/ additions. The following summarizes my clinical findings and independent assessment. CC: fall    S. Pt has no cuff leak this AM. He is following commands    O. Intubated  Lightly sedated  Follows simple commands  Opens eyes to voice  s1s2  Abdomen soft nt nd    ASSESSMENT:  Principal Problem:    Trauma  Active Problems:    Subarachnoid hemorrhage (HCC)    Closed fracture of temporal bone (HCC)    Pneumonia of left lower lobe due to methicillin susceptible Staphylococcus aureus (MSSA) ([facility] [address]    Fall from ground level    Acute blood loss anemia    Insulin dependent diabetes mellitus ([facility] [address]    Acute respiratory failure following trauma and surgery ([facility] [address]  Resolved Problems:    * No resolved hospital problems. *       PLAN:  SAH/ Traumatic Brain Injury--following commands  Neuro status improved  Wean ativan    Tachycardia--due to brain storming--start propranolol 10 mg tid    Acute Resp Failure  Continue Pressure support wean  No cuff leak--Continue IV steroids  Reassess for leak in AM    Moderate calorie protein malnutrition--continue TF    ID: febrile  MSSA ion Sputum  Ancef 2 q q8 D6/7    DVT Proph: SCDS/ Lovenox     Critical care time exclusive of teaching and procedures = 35 min     Pt needs continuous ICU monitoring because the patient is at risk for deterioration from a neurological standpoint.       Moon Arce MD, FACS  6/25/2018  9:50 AM

## 2018-06-25 NOTE — PLAN OF CARE
Problem: Restraint Use - Nonviolent/Non-Self-Destructive Behavior:  Goal: Absence of restraint indications  Absence of restraint indications   Outcome: Not Met This Shift    Goal: Absence of restraint-related injury  Absence of restraint-related injury   Outcome: Met This Shift  Plan of care discussed with patient/family. Patient/family incorporated into plan of care.

## 2018-06-25 NOTE — PROGRESS NOTES
There is no change in patient's neurosurgical status. Will continue to follow along. Restless at times. Moves all extremities  Intubated. Nothing new to add from neurosurgical stand point at this time.

## 2018-06-26 ENCOUNTER — APPOINTMENT (OUTPATIENT)
Dept: GENERAL RADIOLOGY | Age: 48
DRG: 055 | End: 2018-06-26
Payer: COMMERCIAL

## 2018-06-26 LAB
AADO2: 149.4 MMHG
ANION GAP SERPL CALCULATED.3IONS-SCNC: 12 MMOL/L (ref 7–16)
B.E.: 11.9 MMOL/L (ref -3–3)
BASOPHILS ABSOLUTE: 0.03 E9/L (ref 0–0.2)
BASOPHILS RELATIVE PERCENT: 0.2 % (ref 0–2)
BUN BLDV-MCNC: 44 MG/DL (ref 6–20)
CALCIUM SERPL-MCNC: 9.8 MG/DL (ref 8.6–10.2)
CHLORIDE BLD-SCNC: 94 MMOL/L (ref 98–107)
CO2: 37 MMOL/L (ref 22–29)
COHB: 1.4 % (ref 0–1.5)
CREAT SERPL-MCNC: 0.9 MG/DL (ref 0.7–1.2)
CRITICAL: ABNORMAL
DATE ANALYZED: ABNORMAL
DATE OF COLLECTION: ABNORMAL
EOSINOPHILS ABSOLUTE: 0.01 E9/L (ref 0.05–0.5)
EOSINOPHILS RELATIVE PERCENT: 0.1 % (ref 0–6)
FIO2: 40 %
GFR AFRICAN AMERICAN: >60
GFR NON-AFRICAN AMERICAN: >60 ML/MIN/1.73
GLUCOSE BLD-MCNC: 135 MG/DL (ref 74–109)
HCO3: 36.4 MMOL/L (ref 22–26)
HCT VFR BLD CALC: 35.8 % (ref 37–54)
HEMOGLOBIN: 11.5 G/DL (ref 12.5–16.5)
HHB: 4.4 % (ref 0–5)
IMMATURE GRANULOCYTES #: 0.23 E9/L
IMMATURE GRANULOCYTES %: 1.6 % (ref 0–5)
LAB: ABNORMAL
LYMPHOCYTES ABSOLUTE: 1.41 E9/L (ref 1.5–4)
LYMPHOCYTES RELATIVE PERCENT: 9.9 % (ref 20–42)
Lab: 450
MCH RBC QN AUTO: 27.3 PG (ref 26–35)
MCHC RBC AUTO-ENTMCNC: 32.1 % (ref 32–34.5)
MCV RBC AUTO: 84.8 FL (ref 80–99.9)
METER GLUCOSE: 121 MG/DL (ref 70–110)
METER GLUCOSE: 142 MG/DL (ref 70–110)
METER GLUCOSE: 145 MG/DL (ref 70–110)
METER GLUCOSE: 161 MG/DL (ref 70–110)
METER GLUCOSE: 177 MG/DL (ref 70–110)
METHB: 0.2 % (ref 0–1.5)
MODE: ABNORMAL
MONOCYTES ABSOLUTE: 0.64 E9/L (ref 0.1–0.95)
MONOCYTES RELATIVE PERCENT: 4.5 % (ref 2–12)
NEUTROPHILS ABSOLUTE: 11.92 E9/L (ref 1.8–7.3)
NEUTROPHILS RELATIVE PERCENT: 83.7 % (ref 43–80)
O2 SATURATION: 95.5 % (ref 92–98.5)
O2HB: 94 % (ref 94–97)
OPERATOR ID: ABNORMAL
PATIENT TEMP: 37 C
PCO2: 46.4 MMHG (ref 35–45)
PDW BLD-RTO: 13.2 FL (ref 11.5–15)
PEEP/CPAP: 5 CMH?O
PFO2: 1.86 MMHG/%
PH BLOOD GAS: 7.51 (ref 7.35–7.45)
PLATELET # BLD: 339 E9/L (ref 130–450)
PMV BLD AUTO: 10.1 FL (ref 7–12)
PO2: 74.4 MMHG (ref 60–100)
POTASSIUM SERPL-SCNC: 4.2 MMOL/L (ref 3.5–5)
PS: 5 CMH20
RBC # BLD: 4.22 E12/L (ref 3.8–5.8)
RI(T): 201 %
SODIUM BLD-SCNC: 143 MMOL/L (ref 132–146)
SOURCE, BLOOD GAS: ABNORMAL
THB: 12.2 G/DL (ref 11.5–16.5)
TIME ANALYZED: 504
WBC # BLD: 14.2 E9/L (ref 4.5–11.5)

## 2018-06-26 PROCEDURE — 99291 CRITICAL CARE FIRST HOUR: CPT | Performed by: SURGERY

## 2018-06-26 PROCEDURE — 82962 GLUCOSE BLOOD TEST: CPT

## 2018-06-26 PROCEDURE — 97166 OT EVAL MOD COMPLEX 45 MIN: CPT

## 2018-06-26 PROCEDURE — 2000000000 HC ICU R&B

## 2018-06-26 PROCEDURE — 97530 THERAPEUTIC ACTIVITIES: CPT

## 2018-06-26 PROCEDURE — 2580000003 HC RX 258: Performed by: STUDENT IN AN ORGANIZED HEALTH CARE EDUCATION/TRAINING PROGRAM

## 2018-06-26 PROCEDURE — G8978 MOBILITY CURRENT STATUS: HCPCS

## 2018-06-26 PROCEDURE — 6370000000 HC RX 637 (ALT 250 FOR IP): Performed by: STUDENT IN AN ORGANIZED HEALTH CARE EDUCATION/TRAINING PROGRAM

## 2018-06-26 PROCEDURE — G8979 MOBILITY GOAL STATUS: HCPCS

## 2018-06-26 PROCEDURE — 6360000002 HC RX W HCPCS: Performed by: STUDENT IN AN ORGANIZED HEALTH CARE EDUCATION/TRAINING PROGRAM

## 2018-06-26 PROCEDURE — 94640 AIRWAY INHALATION TREATMENT: CPT

## 2018-06-26 PROCEDURE — G8988 SELF CARE GOAL STATUS: HCPCS

## 2018-06-26 PROCEDURE — G8987 SELF CARE CURRENT STATUS: HCPCS

## 2018-06-26 PROCEDURE — 85025 COMPLETE CBC W/AUTO DIFF WBC: CPT

## 2018-06-26 PROCEDURE — 82805 BLOOD GASES W/O2 SATURATION: CPT

## 2018-06-26 PROCEDURE — 2700000000 HC OXYGEN THERAPY PER DAY

## 2018-06-26 PROCEDURE — 36415 COLL VENOUS BLD VENIPUNCTURE: CPT

## 2018-06-26 PROCEDURE — 97162 PT EVAL MOD COMPLEX 30 MIN: CPT

## 2018-06-26 PROCEDURE — 80048 BASIC METABOLIC PNL TOTAL CA: CPT

## 2018-06-26 PROCEDURE — 97535 SELF CARE MNGMENT TRAINING: CPT

## 2018-06-26 PROCEDURE — 71045 X-RAY EXAM CHEST 1 VIEW: CPT

## 2018-06-26 PROCEDURE — 6360000002 HC RX W HCPCS: Performed by: SURGERY

## 2018-06-26 RX ORDER — OXYCODONE HCL 5 MG/5 ML
5 SOLUTION, ORAL ORAL EVERY 4 HOURS PRN
Status: DISCONTINUED | OUTPATIENT
Start: 2018-06-26 | End: 2018-07-10 | Stop reason: HOSPADM

## 2018-06-26 RX ORDER — LORAZEPAM 0.5 MG/1
0.5 TABLET ORAL EVERY 4 HOURS
Status: DISCONTINUED | OUTPATIENT
Start: 2018-06-26 | End: 2018-06-26

## 2018-06-26 RX ADMIN — PANTOPRAZOLE SODIUM 40 MG: 40 GRANULE, DELAYED RELEASE ORAL at 06:18

## 2018-06-26 RX ADMIN — INSULIN LISPRO 5 UNITS: 100 INJECTION, SOLUTION INTRAVENOUS; SUBCUTANEOUS at 04:07

## 2018-06-26 RX ADMIN — ENOXAPARIN SODIUM 30 MG: 30 INJECTION SUBCUTANEOUS at 20:15

## 2018-06-26 RX ADMIN — IPRATROPIUM BROMIDE AND ALBUTEROL SULFATE 1 AMPULE: 2.5; .5 SOLUTION RESPIRATORY (INHALATION) at 01:39

## 2018-06-26 RX ADMIN — CEFAZOLIN SODIUM 2 G: 2 SOLUTION INTRAVENOUS at 11:59

## 2018-06-26 RX ADMIN — POTASSIUM CHLORIDE 40 MEQ: 20 SOLUTION ORAL at 08:44

## 2018-06-26 RX ADMIN — CEFAZOLIN SODIUM 2 G: 2 SOLUTION INTRAVENOUS at 20:15

## 2018-06-26 RX ADMIN — ENOXAPARIN SODIUM 30 MG: 30 INJECTION SUBCUTANEOUS at 08:45

## 2018-06-26 RX ADMIN — PROPRANOLOL HYDROCHLORIDE 10 MG: 10 TABLET ORAL at 13:54

## 2018-06-26 RX ADMIN — OXYCODONE HYDROCHLORIDE 5 MG: 5 SOLUTION ORAL at 22:32

## 2018-06-26 RX ADMIN — INSULIN GLARGINE 60 UNITS: 100 INJECTION, SOLUTION SUBCUTANEOUS at 08:10

## 2018-06-26 RX ADMIN — INSULIN GLARGINE 60 UNITS: 100 INJECTION, SOLUTION SUBCUTANEOUS at 20:21

## 2018-06-26 RX ADMIN — LORAZEPAM 1 MG: 2 INJECTION INTRAMUSCULAR; INTRAVENOUS at 21:31

## 2018-06-26 RX ADMIN — POTASSIUM CHLORIDE 40 MEQ: 20 SOLUTION ORAL at 20:15

## 2018-06-26 RX ADMIN — LORAZEPAM 1 MG: 2 INJECTION INTRAMUSCULAR; INTRAVENOUS at 02:59

## 2018-06-26 RX ADMIN — CEFAZOLIN SODIUM 2 G: 2 SOLUTION INTRAVENOUS at 04:30

## 2018-06-26 RX ADMIN — INSULIN LISPRO 3 UNITS: 100 INJECTION, SOLUTION INTRAVENOUS; SUBCUTANEOUS at 08:13

## 2018-06-26 RX ADMIN — PROPRANOLOL HYDROCHLORIDE 10 MG: 10 TABLET ORAL at 20:12

## 2018-06-26 RX ADMIN — Medication 10 ML: at 21:31

## 2018-06-26 RX ADMIN — SODIUM CHLORIDE, PRESERVATIVE FREE 300 UNITS: 5 INJECTION INTRAVENOUS at 20:15

## 2018-06-26 RX ADMIN — Medication 10 ML: at 08:45

## 2018-06-26 RX ADMIN — INSULIN LISPRO 3 UNITS: 100 INJECTION, SOLUTION INTRAVENOUS; SUBCUTANEOUS at 11:17

## 2018-06-26 RX ADMIN — INSULIN LISPRO 3 UNITS: 100 INJECTION, SOLUTION INTRAVENOUS; SUBCUTANEOUS at 15:37

## 2018-06-26 RX ADMIN — SODIUM CHLORIDE, PRESERVATIVE FREE 300 UNITS: 5 INJECTION INTRAVENOUS at 08:45

## 2018-06-26 RX ADMIN — DEXAMETHASONE SODIUM PHOSPHATE 10 MG: 10 INJECTION INTRAMUSCULAR; INTRAVENOUS at 04:30

## 2018-06-26 RX ADMIN — IPRATROPIUM BROMIDE AND ALBUTEROL SULFATE 1 AMPULE: 2.5; .5 SOLUTION RESPIRATORY (INHALATION) at 11:55

## 2018-06-26 RX ADMIN — OXYCODONE HYDROCHLORIDE 10 MG: 5 SOLUTION ORAL at 04:00

## 2018-06-26 RX ADMIN — IPRATROPIUM BROMIDE AND ALBUTEROL SULFATE 1 AMPULE: 2.5; .5 SOLUTION RESPIRATORY (INHALATION) at 07:49

## 2018-06-26 RX ADMIN — DOCUSATE SODIUM 100 MG: 50 LIQUID ORAL at 08:44

## 2018-06-26 RX ADMIN — PROPRANOLOL HYDROCHLORIDE 10 MG: 10 TABLET ORAL at 08:45

## 2018-06-26 RX ADMIN — Medication 10 ML: at 20:24

## 2018-06-26 RX ADMIN — LORAZEPAM 1 MG: 1 TABLET ORAL at 04:07

## 2018-06-26 ASSESSMENT — PAIN SCALES - GENERAL
PAINLEVEL_OUTOF10: 5
PAINLEVEL_OUTOF10: 0
PAINLEVEL_OUTOF10: 6
PAINLEVEL_OUTOF10: 0

## 2018-06-26 ASSESSMENT — PULMONARY FUNCTION TESTS
PIF_VALUE: 0
PIF_VALUE: 10
PIF_VALUE: 11

## 2018-06-26 NOTE — FLOWSHEET NOTE
Patient awake and alert, attempting to pull out NG tube, and pull at other lines/tubes critical to care. Attempts made to redirect patient unsuccessful. Restraints remain for patient safety. Will continue to monitor.

## 2018-06-26 NOTE — FLOWSHEET NOTE
Pt still attempting to pull at ETT and OGT even with repeated teaching and education. Bilateral wrist restraints remain applied at this time, will continue to monitor.

## 2018-06-26 NOTE — PROGRESS NOTES
Physical Therapy    Facility/Department: [address]  Initial Assessment    NAME: Reddy Reich  : 1970  MRN: [id number]    Date of Service: 2018  Evaluating Therapist: Milton Cook PT, DPT    ROOM #: [id number]  DIAGNOSIS: Trauma  PRECAUTIONS: Falls, 6L O2, NG tube, C-collar  - all imaging is negative, L ICH  PROCEDURES:  extubated    Social:  Pt lives with parents in a 1 floor plan with 2 step(s) and 1 rail(s) to enter. Prior to admission pt walked with no device and was Independent. Per pt's mother in chart, pt works odd jobs intermittently. Initial Evaluation  Date: 18 Treatment  Date:     Short Term/ Long Term   Goals   AM-PAC 6 Clicks 72/77     Does pt have pain? No c/o pain     Bed Mobility  Rolling: SBA  Supine to sit: ModA  Sit to supine: MaxA  Scooting: ModA  SBA   Transfers Sit to stand: MaxA  Stand to sit: MaxA  Stand pivot: NT  [place] with AAD   Ambulation   2 feet side stepping to [facility] with MaxA with no device  >75 feet with Al[place] with AAD   Stair negotiation: ascended and descended NT  >4 steps with 1 rail with Alona   BLE ROM WNL     BLE strength Grossly 3+ to 4-/5  Increase by 1/3 MMT grade   Balance Sitting: ModA dynamic  Standing: MaxA no device  Sitting: SBA  Standing: Al[place] with AAD     Vitals:  Heart Rate at rest 106 bpm Heart Rate post session 107 bpm   SpO2 at rest 93% SpO2 post session 95%   Blood Pressure at rest 125/83 mmHg Blood Pressure post session 116/85 mmHg     Pt is alert and oriented x 1 (self)  Sensation: WNL  Edema: WNL    ASSESSMENT  Pt displays functional ability as noted in the objective portion of this evaluation. Comments/Treatment:  Pt supine in bed upon arrival, agreeable to initial evaluation with OT collaboration. Pt exhibited delayed processing and confusion throughout session. Pt reoriented to time and place. Pt required trunk assistance for bed mobility. Pt was moderately unsteady upon sitting EOB and reported dizziness.   Verbal cues for

## 2018-06-26 NOTE — PLAN OF CARE
Problem: Falls - Risk of:  Goal: Will remain free from falls  Will remain free from falls   Outcome: Met This Shift    Goal: Absence of physical injury  Absence of physical injury   Outcome: Met This Shift      Problem: Risk for Impaired Skin Integrity  Goal: Tissue integrity - skin and mucous membranes  Structural intactness and normal physiological function of skin and  mucous membranes.    Outcome: Met This Shift      Problem: Confusion - Acute:  Goal: Absence of continued neurological deterioration signs and symptoms  Absence of continued neurological deterioration signs and symptoms   Outcome: Not Met This Shift    Goal: Mental status will be restored to baseline  Mental status will be restored to baseline   Outcome: Not Met This Shift

## 2018-06-26 NOTE — PROGRESS NOTES
Dependent    Bed Mobility  Supine to Sit: Mod A  Sit to Supine: Dependent    Functional Transfers Sit to Stand: Max A  Stand to Sit: Max A    Functional Mobility Max A   for side steps to [facility]    Sit balance: Mod A  Stand balance: Max A  Endurance/Activity tolerance: poor+    Vitals:   BP at rest: 125/83 BP at end of session: 116/85   HR at rest: 106 HR at end of session: 107   Spo2 at rest: 93% on O2 via NC  Spo2 at end of session: 95% on O2 via NC     Treatment Narrative: OK from RN to see patient. Evaluation completed with PT collaboration. Upon arrival patient supine with HOB slightly elevated. Supine to sit. Sat EOB roughly 5 minutes, grooming task as noted above. UE ROM/MMT completed. Sit to stand. Functional mobility as noted above. Stand to sit. Sit to supine. Patient properly positioned using pillows and bed mechanics to improve interaction with environment, overall functioning and decrease/prevent edema and contractures. CAM-ICU completed. After session, patient in position as stated above with all devices within reach, all lines and tubes intact, nursing notified. Pt required cues and education as noted above for safe facilitation and completion of tasks. During functional activites and ADLs pt educated on proper hand placement, safety technique, sequencing, and energy conservation techniques. Therapist provided skilled monitoring of HR, O2 saturation, blood pressure and patient's response during treatment session. Pt educated on OT POC, OT role, importance of completing ADL tasks daily as independently as possible to aide in recovery process, pt demonstrated poor understanding, further education likely needed. Prior to and at the end of session, environmental modifications/line management completed for patients safety and efficiency of treatment session.       Assessment of current deficits   Functional mobility [x]  ADLs [x] Strength [x]  Cognition [x]  Functional transfers  [x] IADLs [x] Safety

## 2018-06-26 NOTE — PROGRESS NOTES
Bedside swallow study order received. Will attempt tomorrow. Pt coughing prior to initiation of evaluation and poor vocal quality noted.

## 2018-06-27 ENCOUNTER — APPOINTMENT (OUTPATIENT)
Dept: GENERAL RADIOLOGY | Age: 48
DRG: 055 | End: 2018-06-27
Payer: COMMERCIAL

## 2018-06-27 LAB
ANION GAP SERPL CALCULATED.3IONS-SCNC: 12 MMOL/L (ref 7–16)
BASOPHILS ABSOLUTE: 0 E9/L (ref 0–0.2)
BASOPHILS RELATIVE PERCENT: 0.3 % (ref 0–2)
BUN BLDV-MCNC: 42 MG/DL (ref 6–20)
CALCIUM SERPL-MCNC: 9.7 MG/DL (ref 8.6–10.2)
CHLORIDE BLD-SCNC: 89 MMOL/L (ref 98–107)
CO2: 38 MMOL/L (ref 22–29)
CREAT SERPL-MCNC: 1 MG/DL (ref 0.7–1.2)
EOSINOPHILS ABSOLUTE: 0.31 E9/L (ref 0.05–0.5)
EOSINOPHILS RELATIVE PERCENT: 1.9 % (ref 0–6)
GFR AFRICAN AMERICAN: >60
GFR NON-AFRICAN AMERICAN: >60 ML/MIN/1.73
GLUCOSE BLD-MCNC: 96 MG/DL (ref 74–109)
HCT VFR BLD CALC: 38.8 % (ref 37–54)
HEMOGLOBIN: 12.6 G/DL (ref 12.5–16.5)
LYMPHOCYTES ABSOLUTE: 1.96 E9/L (ref 1.5–4)
LYMPHOCYTES RELATIVE PERCENT: 12 % (ref 20–42)
MCH RBC QN AUTO: 27.8 PG (ref 26–35)
MCHC RBC AUTO-ENTMCNC: 32.5 % (ref 32–34.5)
MCV RBC AUTO: 85.5 FL (ref 80–99.9)
METAMYELOCYTES RELATIVE PERCENT: 0.9 % (ref 0–1)
METER GLUCOSE: 107 MG/DL (ref 70–110)
METER GLUCOSE: 111 MG/DL (ref 70–110)
METER GLUCOSE: 133 MG/DL (ref 70–110)
METER GLUCOSE: 133 MG/DL (ref 70–110)
METER GLUCOSE: 170 MG/DL (ref 70–110)
METER GLUCOSE: 209 MG/DL (ref 70–110)
METER GLUCOSE: 56 MG/DL (ref 70–110)
METER GLUCOSE: 63 MG/DL (ref 70–110)
METER GLUCOSE: 69 MG/DL (ref 70–110)
MONOCYTES ABSOLUTE: 1.14 E9/L (ref 0.1–0.95)
MONOCYTES RELATIVE PERCENT: 7.4 % (ref 2–12)
MYELOCYTE PERCENT: 0.9 % (ref 0–0)
NEUTROPHILS ABSOLUTE: 12.88 E9/L (ref 1.8–7.3)
NEUTROPHILS RELATIVE PERCENT: 76.9 % (ref 43–80)
PDW BLD-RTO: 13.3 FL (ref 11.5–15)
PLATELET # BLD: 433 E9/L (ref 130–450)
PMV BLD AUTO: 9.6 FL (ref 7–12)
POTASSIUM SERPL-SCNC: 3.7 MMOL/L (ref 3.5–5)
RBC # BLD: 4.54 E12/L (ref 3.8–5.8)
SODIUM BLD-SCNC: 139 MMOL/L (ref 132–146)
WBC # BLD: 16.3 E9/L (ref 4.5–11.5)

## 2018-06-27 PROCEDURE — 97112 NEUROMUSCULAR REEDUCATION: CPT

## 2018-06-27 PROCEDURE — 36415 COLL VENOUS BLD VENIPUNCTURE: CPT

## 2018-06-27 PROCEDURE — 36592 COLLECT BLOOD FROM PICC: CPT

## 2018-06-27 PROCEDURE — 85025 COMPLETE CBC W/AUTO DIFF WBC: CPT

## 2018-06-27 PROCEDURE — 2580000003 HC RX 258: Performed by: STUDENT IN AN ORGANIZED HEALTH CARE EDUCATION/TRAINING PROGRAM

## 2018-06-27 PROCEDURE — 74018 RADEX ABDOMEN 1 VIEW: CPT

## 2018-06-27 PROCEDURE — 6360000002 HC RX W HCPCS: Performed by: STUDENT IN AN ORGANIZED HEALTH CARE EDUCATION/TRAINING PROGRAM

## 2018-06-27 PROCEDURE — 80048 BASIC METABOLIC PNL TOTAL CA: CPT

## 2018-06-27 PROCEDURE — 2000000000 HC ICU R&B

## 2018-06-27 PROCEDURE — C1889 IMPLANT/INSERT DEVICE, NOC: HCPCS

## 2018-06-27 PROCEDURE — 97110 THERAPEUTIC EXERCISES: CPT

## 2018-06-27 PROCEDURE — 97535 SELF CARE MNGMENT TRAINING: CPT

## 2018-06-27 PROCEDURE — 2700000000 HC OXYGEN THERAPY PER DAY

## 2018-06-27 PROCEDURE — 92610 EVALUATE SWALLOWING FUNCTION: CPT

## 2018-06-27 PROCEDURE — 6370000000 HC RX 637 (ALT 250 FOR IP): Performed by: STUDENT IN AN ORGANIZED HEALTH CARE EDUCATION/TRAINING PROGRAM

## 2018-06-27 PROCEDURE — 97530 THERAPEUTIC ACTIVITIES: CPT

## 2018-06-27 PROCEDURE — 99233 SBSQ HOSP IP/OBS HIGH 50: CPT | Performed by: SURGERY

## 2018-06-27 PROCEDURE — 71045 X-RAY EXAM CHEST 1 VIEW: CPT

## 2018-06-27 PROCEDURE — 82962 GLUCOSE BLOOD TEST: CPT

## 2018-06-27 RX ORDER — DEXTROSE MONOHYDRATE 100 MG/ML
INJECTION, SOLUTION INTRAVENOUS CONTINUOUS
Status: DISCONTINUED | OUTPATIENT
Start: 2018-06-27 | End: 2018-06-28

## 2018-06-27 RX ORDER — LORAZEPAM 2 MG/ML
0.5 INJECTION INTRAMUSCULAR EVERY 6 HOURS PRN
Status: DISCONTINUED | OUTPATIENT
Start: 2018-06-27 | End: 2018-06-28

## 2018-06-27 RX ORDER — INSULIN GLARGINE 100 [IU]/ML
50 INJECTION, SOLUTION SUBCUTANEOUS 2 TIMES DAILY
Status: DISCONTINUED | OUTPATIENT
Start: 2018-06-27 | End: 2018-06-27

## 2018-06-27 RX ORDER — INSULIN GLARGINE 100 [IU]/ML
20 INJECTION, SOLUTION SUBCUTANEOUS 2 TIMES DAILY
Status: DISCONTINUED | OUTPATIENT
Start: 2018-06-28 | End: 2018-06-29

## 2018-06-27 RX ADMIN — OXYCODONE HYDROCHLORIDE 5 MG: 5 SOLUTION ORAL at 21:08

## 2018-06-27 RX ADMIN — PANTOPRAZOLE SODIUM 40 MG: 40 GRANULE, DELAYED RELEASE ORAL at 05:22

## 2018-06-27 RX ADMIN — PROPRANOLOL HYDROCHLORIDE 10 MG: 10 TABLET ORAL at 21:08

## 2018-06-27 RX ADMIN — DEXTROSE MONOHYDRATE 12.5 G: 25 INJECTION, SOLUTION INTRAVENOUS at 04:41

## 2018-06-27 RX ADMIN — LORAZEPAM 0.5 MG: 2 INJECTION INTRAMUSCULAR; INTRAVENOUS at 16:20

## 2018-06-27 RX ADMIN — ENOXAPARIN SODIUM 30 MG: 30 INJECTION SUBCUTANEOUS at 09:21

## 2018-06-27 RX ADMIN — POTASSIUM CHLORIDE 40 MEQ: 20 SOLUTION ORAL at 21:09

## 2018-06-27 RX ADMIN — Medication 10 ML: at 04:33

## 2018-06-27 RX ADMIN — Medication 10 ML: at 09:10

## 2018-06-27 RX ADMIN — LORAZEPAM 1 MG: 2 INJECTION INTRAMUSCULAR; INTRAVENOUS at 04:30

## 2018-06-27 RX ADMIN — SODIUM CHLORIDE, PRESERVATIVE FREE 300 UNITS: 5 INJECTION INTRAVENOUS at 21:09

## 2018-06-27 RX ADMIN — OXYCODONE HYDROCHLORIDE 5 MG: 5 SOLUTION ORAL at 04:31

## 2018-06-27 RX ADMIN — PROPRANOLOL HYDROCHLORIDE 10 MG: 10 TABLET ORAL at 15:00

## 2018-06-27 RX ADMIN — Medication 10 ML: at 21:09

## 2018-06-27 RX ADMIN — SODIUM CHLORIDE, PRESERVATIVE FREE 300 UNITS: 5 INJECTION INTRAVENOUS at 09:21

## 2018-06-27 RX ADMIN — INSULIN LISPRO 3 UNITS: 100 INJECTION, SOLUTION INTRAVENOUS; SUBCUTANEOUS at 23:42

## 2018-06-27 RX ADMIN — INSULIN LISPRO 6 UNITS: 100 INJECTION, SOLUTION INTRAVENOUS; SUBCUTANEOUS at 21:16

## 2018-06-27 RX ADMIN — DEXTROSE MONOHYDRATE: 100 INJECTION, SOLUTION INTRAVENOUS at 09:21

## 2018-06-27 RX ADMIN — DEXTROSE MONOHYDRATE 12.5 G: 25 INJECTION, SOLUTION INTRAVENOUS at 00:01

## 2018-06-27 RX ADMIN — ENOXAPARIN SODIUM 30 MG: 30 INJECTION SUBCUTANEOUS at 21:09

## 2018-06-27 ASSESSMENT — PAIN SCALES - GENERAL
PAINLEVEL_OUTOF10: 4
PAINLEVEL_OUTOF10: 0
PAINLEVEL_OUTOF10: 5
PAINLEVEL_OUTOF10: 0
PAINLEVEL_OUTOF10: 0

## 2018-06-27 NOTE — FLOWSHEET NOTE
Patient attempting to reach for NG and bailey. Unable to be educated and redirected. Bilateral soft wrist restraints continued for patient safety, will continue to monitor.

## 2018-06-27 NOTE — PROGRESS NOTES
[facility]  ATTENDING PHYSICIAN PROGRESS NOTE     I have examined the patient, reviewed the record, and discussed the case with the APN/  Resident. I have reviewed all relevant labs and imaging data. Please refer to the  APN/ resident's note. I agree with the  assessment and plan with the following corrections/ additions. The following summarizes my clinical findings and independent assessment. CC: fall    S. EXTUBATED YESTERDAY. He pulled out his NG tube overnight    O.  Awake and alert to self  Follows simple commands  Opens eyes to voice  s1s2  Abdomen soft nt nd    ASSESSMENT:  Principal Problem:    Trauma  Active Problems:    Subarachnoid hemorrhage (HCC)    Closed fracture of temporal bone (HCC)    Pneumonia of left lower lobe due to methicillin susceptible Staphylococcus aureus (MSSA) ([facility] [address]    Fall from ground level    Acute blood loss anemia    Insulin dependent diabetes mellitus ([facility] [address]    Acute respiratory failure following trauma and surgery ([facility] [address]  Resolved Problems:    * No resolved hospital problems.  *       PLAN:  SAH/ Traumatic Brain Injury--following commands  Neuro status improved  Continue to wean PRN ativan  Change oxycodone to PRN       Tachycardia improved--due to brain storming-- propranolol 10 mg tid    Acute Resp insufficiency  Poor cough       Moderate calorie protein malnutrition--NG pulled out  Will order corpak  Speech exercises  Failed swallow yesterday    ID: afebrile  Treated for MSSA in sputum  WBC 16 now  Repeat Cx  Check CXR    DVT Proph: SCDS/ Lovenox      Continue ICU      Dayana Hussein MD, FACS  6/27/2018  10:00 AM

## 2018-06-27 NOTE — PROGRESS NOTES
Impression: Cerebral contusion with subarachnoid hemorrhage, left  frontotemporal lobe. Blood in the left ear canal, possibly basal skull  fracture of temporal bone. There is no change in patient's neurosurgical status.   Repeat CT scan of brain Friday  Will continue to follow along.

## 2018-06-27 NOTE — PROGRESS NOTES
Physical Therapy  Facility/Department:  3NE SICU  Daily Treatment Note  NAME: Stella Coy  : 1970  MRN: [id number]    Date of Service: 2018     Attempted PT treatment this morning, however pt having NG tube placed at this time. Will recheck at a later time/date as able. Continue with current POC.      [facility] [place]  License Number: [id number]

## 2018-06-27 NOTE — FLOWSHEET NOTE
Patient reaches for PICC line and IV tubing and pulls off nasal canula. Reminded he is in the hospital, repositioned for comfort and bath given.

## 2018-06-27 NOTE — PROGRESS NOTES
Occupational Therapy     Date:2018  Patient Name: Virgen Leung  MRN: [id number]  : 1970  Room: [address] [place] [address]-A       Reviewed chart and attempted to treat pt however nursing deferred due to pt receiving NGT . Will attempt at later date/time.        Lian LY [id number]

## 2018-06-27 NOTE — PROGRESS NOTES
admitting diagnosis and active problem list, as listed below have been reviewed prior to initiation of this evaluation.      ADMITTING DIAGNOSIS: Trauma [T14.90XA]     ACTIVE PROBLEM LIST:   Patient Active Problem List   Diagnosis    Trauma    [facility]ach[facility]oid hemorrhage [facility] [address]    Closed fracture of temporal bone ([facility] [address]    Pneumonia of left lower lobe due to methicillin susceptible Staphylococcus aureus (MSSA) ([facility] [address]    Fall from ground level    Acute blood loss anemia    Insulin dependent diabetes mellitus ([address]    Acute respiratory failure following trauma and surgery ([facility] [address]

## 2018-06-27 NOTE — PROGRESS NOTES
change and cleansing. Pt was able to follow commands 75% of the time. Once seated eob pt performed therex with occasional tactile cues required. Pt stood x 2 reps and attempted side stepping, however pt unable to advance R LE. Initially demonstrated strong R lean upon standing, improved second stand. Pt unable to reach terminal knee extension on R. Pt was left supine with call light left by patient. Restraints donned. Time in: 1320  Time out: 1350    Pt is making fair progress toward established Physical Therapy goals. Continue with physical therapy current plan of care.     Uyen Gtz PTA  License Number: [id number]

## 2018-06-27 NOTE — PROGRESS NOTES
Acute Rehab Pre-Admission Screen      Referral date: 6/27/18   6055  Onset/Hospital Admit Date: 6/16/2018  1:22 AM  Current Location: [address] [place] [address]  Admitting Diagnosis: trauma   Surgery /  Date:    Name: Jimbo Dimas: 1970  Age: 56 y.o. Address:   Home Phone: [phone] (home)  Social Security #: [id number]    Sex: male  Race:   Marital Status:    Ethnic/Cultural/Moravian Considerations:     Advanced Directives: [x] Full Code  [] [facility] [] Medications only       [] Living Will  [] DPOA      []Organ donor      [] No mechanical breathing or ventilation     [] no tube feeding, nutrition or hydration      [x] Patient does not have advanced directives or living will     Copies in Chart: n/a    COVERAGE INFORMATION   Primary Insurer: [address]  Payor Contact:   Phone:   FAX:  Authorization #:   Secondary Insurer:   Payor Contact:   Phone:   Authorization #:   Medicare #:   Medicaid #:   Verified coverage: [] Patient  [] Family/caregiver    [x] financial department [] insurance carrier    PHYSICIAN / Lizabeth Jacques INFORMATION     Referring Physician: LUZ Stringer  Attending Physician: Malone Romberg, MD  Primary Care Physician: Johnie Arriaga DO  Consultants/Opinions (see full consult notes on chart): CLAY Whelan ( neurosurgery) -Cerebral contusion with subarachnoid hemorrhage, left frontotemporal lobe.       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  Prior living arrangements: [x] Home  [] Assisted living  [] SNF [] Other  Lived with:  [] Alone  [] Spouse  [x] Family  [] Other  Lived with: parents Contact phone: [phone]  Home:  1 story home  2 entry steps  Rails: 1     Bedroom: [x] 1st floor  [] 2nd floor    Bathroom:  [x] 1st floor  [] 2nd floor    Prior Functional Level: Independent for: gait,transfers, ADL's, drove  Assistance for:   Dependent for:   Dominant hand: [x] Right  [] Left     Previous Home Equipment:  [] Cane [] Grab bars [] Orthotic / prosthetic   [] Shower chair [] Tub bench  [] 3-in-1 Commode [] Long handle sponge   [] Oxygen [] Sock aide  [] Wheelchair  [] motorized wc/scooter  [] Wheelchair cushion   [] Crutches [] Long handle shoehorn  [] Reachers [] Toilet seat elevator  [] Walker(wheeled)   [] Walker(standard) [] Mechanical lift    [x] None of the above    MEDICAL UPDATE:  History of present admission: presents 6/16/18 -was reportedly drinking alcohol at a party and fell backwards per EMS. GCS was initially 5 per EMS. He had multiple episodes of brown emesis prior to arrival.  [facility] and basal skull fracture. 6/16/18 was very agitated and was intubated to protect his airway. 6/26/18 was extubated. 6/27/18 still agitated trying to pull out IV's, NGT. Past Medical:  Past Medical History:   Diagnosis Date    Diabetes mellitus ([address]         Influenza vaccine given:  [] yes   [] no Date:  [x] n/a (out of season)    Has patient had 2 or more falls in the past year? [] yes   [x] no Date  Has patient had any fall with injury in the past year? [x] yes   [] no  [] unknown  Has patient had major surgery during past 100 days prior to admission? [] yes   [x] no Type/ Date:       Surgical History:  No past surgical history on file.       Current Co-morbidities:  [] Alzheimer's   [] Dysphasia     [] Parkinsonism  [] Amputation   [] Edema of larynx  [] Peripheral artery disease   [] Anemia      [] Encephalopathy  [] Peripheral vascular disease  [] Anxiety   [] Gangrene   [] Pneumonia  [] Aphasia   [] Gout    [] Polyneuropathy  [] Asthma   [] Heart Failure (diastolic) [] Post-polio syndrome  [] Atrial fibrillation  [] Heart Failure (left-sided) [] Pseudomonas enteritis   [] Blind    [] Heart Failure (right-sided) [] Pulmonary embolism  [] Cellulitis     [] Heart Failure (systolic) [] Renal dialysis  [] Clostridium difficile  [] Hemiparesis   [] Renal failure  [] Congestive heart failure [] Hypertension   [] Rheumatoid arthritis  [] COPD   [] Hypotension   [] Seizure disorder   [] Coronary Artery Disease [] Hypothyroidism  [] Septicemia   [] Deaf    [] Malnutrition   [] Sleep apnea  [] Depression   [] Morbid obesity  [] Spinal cord injury  [x] Diabetes   [] MRSA   [] Stroke  [] Diabetic nephropathy  [] Myocardial infarction  [] Tracheostomy  [] Diabetic neuropathy  [] Osteoarthritis  [] Traumatic brain injury   [] Diabetic retinopathy  [] Osteoporosis   [] Urinary tract infection  [] DVT    [] Pancytopenia  [] Vocal cord paralysis  [x] SAH    []     [] VRE          Medical/Functional Conditions, Risk for Clinical Complications/Interventions Required:    Medical/Functional Conditions: Diabetes ( monitor Blood sugar) , SAH ( monitor neuro status)    Risk for Medical/Clinical Complications: hypo/hyperglycemia, falls, injuries, changes in neuro status    CLINICAL DATA:     Height : 6'2\"     Weight:  179#   BMI: 23.1         Date: 6/27/18 Date:  Date:    temperature      pulse 109     respirations 22     Blood pressure 128/88     Pulse oximeter 96% on 6L nasal cannula          ALLERGIES: Patient has no known allergies.     DIET : DIET TUBE FEED CONTINUOUS/CYCLIC NPO; Diabetic; Nasogastric; Continuous; 25; 75; 24; Exceptions are: Sips with Meds  Diet Tube Feed Modular: Protein Modular    Current Lab and Diagnostic Tests:   Recent Results (from the past 24 hour(s))   POCT Glucose    Collection Time: 06/26/18  3:35 PM   Result Value Ref Range    Meter Glucose 161 (H) 70 - 110 mg/dL   POCT Glucose    Collection Time: 06/26/18  8:20 PM   Result Value Ref Range    Meter Glucose 121 (H) 70 - 110 mg/dL POCT Glucose    Collection Time: 06/26/18 11:56 PM   Result Value Ref Range    Meter Glucose 63 (L) 70 - 110 mg/dL   POCT Glucose    Collection Time: 06/27/18 12:32 AM   Result Value Ref Range    Meter Glucose 107 70 - 110 mg/dL   POCT Glucose    Collection Time: 06/27/18  4:40 AM   Result Value Ref Range    Meter Glucose 56 (L) 70 - 110 mg/dL   POCT Glucose    Collection Time: 06/27/18  5:03 AM   Result Value Ref Range    Meter Glucose 111 (H) 70 - 110 mg/dL   CBC auto differential    Collection Time: 06/27/18  5:20 AM   Result Value Ref Range    WBC 16.3 (H) 4.5 - 11.5 E9/L    RBC 4.54 3.80 - 5.80 E12/L    Hemoglobin 12.6 12.5 - 16.5 g/dL    Hematocrit 38.8 37.0 - 54.0 %    MCV 85.5 80.0 - 99.9 fL    MCH 27.8 26.0 - 35.0 pg    MCHC 32.5 32.0 - 34.5 %    RDW 13.3 11.5 - 15.0 fL    Platelets 139 875 - 155 E9/L    MPV 9.6 7.0 - 12.0 fL    Neutrophils % 76.9 43.0 - 80.0 %    Lymphocytes % 12.0 (L) 20.0 - 42.0 %    Monocytes % 7.4 2.0 - 12.0 %    Eosinophils % 1.9 0.0 - 6.0 %    Basophils % 0.3 0.0 - 2.0 %    Neutrophils # 12.88 (H) 1.80 - 7.30 E9/L    Lymphocytes # 1.96 1.50 - 4.00 E9/L    Monocytes # 1.14 (H) 0.10 - 0.95 E9/L    Eosinophils # 0.31 0.05 - 0.50 E9/L    Basophils # 0.00 0.00 - 0.20 E9/L    Metamyelocytes Relative 0.9 0.0 - 1.0 %    Myelocytes Relative 0.9 0 - 0 %   Basic Metabolic Panel    Collection Time: 06/27/18  5:20 AM   Result Value Ref Range    Sodium 139 132 - 146 mmol/L    Potassium 3.7 3.5 - 5.0 mmol/L    Chloride 89 (L) 98 - 107 mmol/L    CO2 38 (H) 22 - 29 mmol/L    Anion Gap 12 7 - 16 mmol/L    Glucose 96 74 - 109 mg/dL    BUN 42 (H) 6 - 20 mg/dL    CREATININE 1.0 0.7 - 1.2 mg/dL    GFR Non-African American >60 >=60 mL/min/1.73    GFR African American >60     Calcium 9.7 8.6 - 10.2 mg/dL   POCT Glucose    Collection Time: 06/27/18  9:17 AM   Result Value Ref Range    Meter Glucose 69 (L) 70 - 110 mg/dL   POCT Glucose    Collection Time: 06/27/18 11:55 AM   Result Value Ref Range    Meter Glucose 133 (H) 70 - 110 mg/dL     Ct Head Wo Contrast  Result Date: 6/16/2018  Persistent but slightly improved subarachnoid and small subdural hematoma in the left temporal /frontal region. Horizontal fracture through the left temporal bone and pansinusitis     Ct Head Wo Contrast  Result Date: 6/16/2018    Stable left temporal subarachnoid hemorrhage. Suspect mild hemorrhagic contusion of the inferior right frontal lobe with mild associated edema, new. Stable nondisplaced left mastoid temporal bone fracture. Ct Head Wo Contrast  Addendum Date: 6/16/2018       1. Small amount of subarachnoid hemorrhage within the left temporal sulci. 2.  Mild left posterior soft tissue swelling/contusion. 3.  Diffuse paranasal sinusitis. ADDENDUM: Small amount of fluid within the left mastoid air cells as well as the left external auditory canal. On the coronal images, there is a minimally displaced, horizontally oriented fracture extending through the left mastoid air cells and into the left temporomandibular joint (series 4, images 22-25). Fracture extends posteriorly into the left posterior temporal calvarium, near the suture (series 4, images 35-40). Additionally, there is a single locule of gas within the left posterior fossa extra-axial region, possibly secondary to fracture. T  1. Small amount of subarachnoid hemorrhage within the left temporal sulci. 2.  Mild left posterior soft tissue swelling/contusion. 3.  Diffuse paranasal sinusitis. Ct Cervical Spine Wo Contrast  Result Date: 6/16/2018  1. No acute findings. 2. Non-acute findings are described above.        Additional labs or diagnostic studies needed before admission to rehabilitation unit:      Medications:   insulin glargine  50 Units Subcutaneous BID    insulin lispro  0-18 Units Subcutaneous Q4H    propranolol  10 mg Oral TID    potassium chloride  40 mEq Oral BID    heparin flush  3 mL Intravenous 2 times per day    enoxaparin  30 mg Impaired  [] Speaking Impaired  [] Blind    [] Special equipment:  [] Devices/Splints  [] Type   [] Brace   [] Type  [] Bariatric bed  [] Extra wide commode  [] Extra wide wheelchair [] Extra wide walker  [] Leopoldo walker  [] Leopoldo wheelchair  [] Transfer lift    [] Other equipment     FUNCTIONAL STATUS PT / Viki Jerome / Todd Dynes:  FIM / EVAL Discipline Initial: 6/26/18 Follow Up: 7/9/18 Current:    Eating OT Dependent  Set up     Grooming OT Max Assist  Stand by Assist     Bathing OT Dependent  nt    Dressing Upper Extremity OT Max Assist  Minimum assistance     Dressing Lower Extremity OT Dependent  Stand by Assist     Toileting OT      Toilet Transfers OT      Tub/Shower Transfers OT      Homemaking OT      Bed Mobility PT Max Assist  Stand by Assist     Bed/Wheelchair Transfers PT Max Assist  Stand by Assist     Locomotion Walk / Wheelchair  Device:  Distance: PT 2' Max Assist  150' wheeled walker Stand by Assist     Endurance PT      Expression SP      Social Interaction SP      Problem Solving SP      Memory SP      Comprehension SP      Swallowing SP      Bowel Management NSG      Bladder Management NSG        Comments on Functional Status:     [x] Able to participate a minimum of 3 hours per day of therapy intervention    Required treatments/services: [x] Rehabilitation nursing [] Dietitian / nurtition                 [x] Case management  [] Respiratory Therapy      [x] Social work   [] Other     Required Therapy:  Therapy Hours per Day Days per Week Therapeutic Interventions Required   [x] Physical Therapy      [x] Occupational Therapy      [] Speech Pathology      [] Prosthetics / Ching Passer       []         Anticipated Discharge Plan:   Anticipated DME Needs:  [] Home     [] Commode   [] Alone    [] Wheelchair   [] Supervised    [] [place]   [] Assist    [] Oxygen  [] LTAC     [] Hospital Bed  [] Assisted Living    [] Ramp  [] [facility]   [] To Be Determined      Anticipated Home Health Services: ____________________________________________________________________  ____________________________________________________________________  ____________________________________________________________________  ____________________________________________________________________  ____________________________________________________________________      Physician Signature:_____________________________________    Print Signature:_________________________________________    Date:    Time:          PRE-SCREEN ASSESSMENT UPDATE (if not admitted within 48 hours of initial pre-screen)    Medical Update/Changes:     Functional Update/Changes:       Reviewer Signature:_____________________________________    Date:   Time:     PHYSICIAN ADMISSION DETERMINATION AND REVIEW UPDATE:     ____________________________________________________________________  ____________________________________________________________________  ____________________________________________________________________  ____________________________________________________________________  ____________________________________________________________________                                                                                                                                                                                                                                                                                                                                            Physician Signature:_____________________________________    Print Signature:_________________________________________    Date:     Time:

## 2018-06-27 NOTE — PROGRESS NOTES
Occupational Therapy  OT BEDSIDE TREATMENT NOTE      Date:2018  Patient Name: Radhames Ray  MRN: [id number]  : 1970  Room: [address] [place], [place] [address]     Evaluating OT: Khoi Washington OTR/L     AM-PAC Daily Activity Raw Score:   G-Code: CL     Recommended Adaptive Equipment: To be determined      Diagnosis: Trauma; L ICH  Pertinent Medical History: DM     Precautions: falls, NGT, c-collar for comfort (all imaging clear), O2     Home Living: Pt questionable historian; pt lives with mother and father in a 1 story home with 2 stairs to enter and one rail; bed/bath on main level  Bathroom setup: walk-in shower with no safety bars; standard commode  Equipment owned: None     Prior Level of Function: Independent with ADLs and with IADLs; using no device for ambulation. Driving: yes  Occupation: works \"odd jobs\"    Pain: Pt c/o pain however not rated. Cognition: Pt alert however orientation questionable due to limited verbal responses. Pt follows approximately 75% of the commands asked of him. Goals:   GOAL 1: Pt will improve UB dressing/bathing to min A while seated EOB   GOAL 2: Pt will improve LB dressing/bathing to min A with use of AE PRN  GOAL 3: Pt will improve all functional transfers to min A from standard surface height  GOAL 4: Pt will improve grooming/hygiene tasks to SBA while seated   GOAL 5: Pt will improve self feeding task to modified independent when medically appropriate  GOAL 6: Pt will demo G- activity tolerance/endurance during 15-20 min ADL task   GOAL 7: Pt will demo G- tolerance for PROM and AAROM in all planes to improve BUE strength/functioning for ADL tasks  GOAL 8: Pt will demo G- dynamic sitting balance EOB during ADL tasks  GOAL 9: Pt will correctly squeeze hand on the letter A during the sequence CAS[place]LAN[place] 0-1 errors.      Functional Assessment:    Initial Status 18 Current status    Feeding  Dependent  NGT  NGT   Grooming  Max A  Attempted oral hygiene with toothette and mouthwash at EOB; [place] A for task completion  Max A;    [place] to wash face while pt sitting on the EOB with Min A to increase sitting balance. Upper Body Dressing Max A  Max A; To clemente/doff gown while in supine.       Lower Body Dressing Dependent Dep; To clemente socks.       Bathing Dependent N/T      Toileting  Dependent Dep;    Min A to roll to complete hygiene with Dep A due to incontinence of bowel.      Bed Mobility  Supine to Sit: Mod A  Sit to Supine: Dependent  Sup > Sit: Max A  Rolling: Min A;    Pt required Min A to roll from side to side and Max A to transfer from supine to sitting position with Min verbal prompting to increase safety. Pt sat up on the EOB for 10 minutes with Min A while completing exercises to increase strength and tolerance. Functional Transfers Sit to Stand: Max A  Stand to Sit: Max A  Sit <>Stand: Mod A x2; Mod A of 2 to transfer from sit to standing position x2 with Mod verbal prompting due to leaning to R side and pt could not extend RLE. Functional Mobility Max A   for side steps to [facility] N/A      Sit balance: Min A; Fair  Stand balance: Mod A of 2; Poor-  Endurance/Activity tolerance: poor+/Fair-         Comments: Upon arrival pt in supine with restraints applied. At end of session pt left in supine with HOB elevated, pillows under BUEs, restraints applied, all lines and tubes intact, and call light within reach. · Pt has made fair progress towards set goals.    · Continue with current plan of care    Time in: 1:20  Time out: 1:50  Total Tx Time: 30 mins    [place] ALMAGUER/STEPHANIE [id number]

## 2018-06-28 LAB
ANION GAP SERPL CALCULATED.3IONS-SCNC: 9 MMOL/L (ref 7–16)
BASOPHILS ABSOLUTE: 0.03 E9/L (ref 0–0.2)
BASOPHILS RELATIVE PERCENT: 0.2 % (ref 0–2)
BLOOD CULTURE, ROUTINE: NORMAL
BUN BLDV-MCNC: 28 MG/DL (ref 6–20)
CALCIUM SERPL-MCNC: 9.3 MG/DL (ref 8.6–10.2)
CHLORIDE BLD-SCNC: 89 MMOL/L (ref 98–107)
CO2: 37 MMOL/L (ref 22–29)
CREAT SERPL-MCNC: 0.8 MG/DL (ref 0.7–1.2)
CULTURE, BLOOD 2: NORMAL
EOSINOPHILS ABSOLUTE: 0.35 E9/L (ref 0.05–0.5)
EOSINOPHILS RELATIVE PERCENT: 2.6 % (ref 0–6)
GFR AFRICAN AMERICAN: >60
GFR NON-AFRICAN AMERICAN: >60 ML/MIN/1.73
GLUCOSE BLD-MCNC: 162 MG/DL (ref 74–109)
HCT VFR BLD CALC: 37.5 % (ref 37–54)
HEMOGLOBIN: 12 G/DL (ref 12.5–16.5)
IMMATURE GRANULOCYTES #: 0.23 E9/L
IMMATURE GRANULOCYTES %: 1.7 % (ref 0–5)
LYMPHOCYTES ABSOLUTE: 2.8 E9/L (ref 1.5–4)
LYMPHOCYTES RELATIVE PERCENT: 21.1 % (ref 20–42)
MCH RBC QN AUTO: 27.1 PG (ref 26–35)
MCHC RBC AUTO-ENTMCNC: 32 % (ref 32–34.5)
MCV RBC AUTO: 84.8 FL (ref 80–99.9)
METER GLUCOSE: 163 MG/DL (ref 70–110)
METER GLUCOSE: 171 MG/DL (ref 70–110)
METER GLUCOSE: 173 MG/DL (ref 70–110)
METER GLUCOSE: 195 MG/DL (ref 70–110)
METER GLUCOSE: 204 MG/DL (ref 70–110)
METER GLUCOSE: 261 MG/DL (ref 70–110)
MONOCYTES ABSOLUTE: 1.32 E9/L (ref 0.1–0.95)
MONOCYTES RELATIVE PERCENT: 10 % (ref 2–12)
NEUTROPHILS ABSOLUTE: 8.52 E9/L (ref 1.8–7.3)
NEUTROPHILS RELATIVE PERCENT: 64.4 % (ref 43–80)
PDW BLD-RTO: 13.3 FL (ref 11.5–15)
PLATELET # BLD: 370 E9/L (ref 130–450)
PMV BLD AUTO: 9.6 FL (ref 7–12)
POTASSIUM SERPL-SCNC: 4 MMOL/L (ref 3.5–5)
RBC # BLD: 4.42 E12/L (ref 3.8–5.8)
SODIUM BLD-SCNC: 135 MMOL/L (ref 132–146)
WBC # BLD: 13.3 E9/L (ref 4.5–11.5)

## 2018-06-28 PROCEDURE — 2580000003 HC RX 258: Performed by: SURGERY

## 2018-06-28 PROCEDURE — 6360000002 HC RX W HCPCS: Performed by: STUDENT IN AN ORGANIZED HEALTH CARE EDUCATION/TRAINING PROGRAM

## 2018-06-28 PROCEDURE — 36415 COLL VENOUS BLD VENIPUNCTURE: CPT

## 2018-06-28 PROCEDURE — 97530 THERAPEUTIC ACTIVITIES: CPT

## 2018-06-28 PROCEDURE — 2000000000 HC ICU R&B

## 2018-06-28 PROCEDURE — 2580000003 HC RX 258: Performed by: STUDENT IN AN ORGANIZED HEALTH CARE EDUCATION/TRAINING PROGRAM

## 2018-06-28 PROCEDURE — 6370000000 HC RX 637 (ALT 250 FOR IP): Performed by: SURGERY

## 2018-06-28 PROCEDURE — 80048 BASIC METABOLIC PNL TOTAL CA: CPT

## 2018-06-28 PROCEDURE — 99232 SBSQ HOSP IP/OBS MODERATE 35: CPT | Performed by: NEUROLOGICAL SURGERY

## 2018-06-28 PROCEDURE — 82962 GLUCOSE BLOOD TEST: CPT

## 2018-06-28 PROCEDURE — 6370000000 HC RX 637 (ALT 250 FOR IP): Performed by: STUDENT IN AN ORGANIZED HEALTH CARE EDUCATION/TRAINING PROGRAM

## 2018-06-28 PROCEDURE — 85025 COMPLETE CBC W/AUTO DIFF WBC: CPT

## 2018-06-28 PROCEDURE — 87040 BLOOD CULTURE FOR BACTERIA: CPT

## 2018-06-28 PROCEDURE — 99233 SBSQ HOSP IP/OBS HIGH 50: CPT | Performed by: SURGERY

## 2018-06-28 PROCEDURE — 97535 SELF CARE MNGMENT TRAINING: CPT

## 2018-06-28 PROCEDURE — 6360000002 HC RX W HCPCS: Performed by: SURGERY

## 2018-06-28 PROCEDURE — 2700000000 HC OXYGEN THERAPY PER DAY

## 2018-06-28 PROCEDURE — 97112 NEUROMUSCULAR REEDUCATION: CPT

## 2018-06-28 RX ORDER — PROPRANOLOL HYDROCHLORIDE 20 MG/1
20 TABLET ORAL 3 TIMES DAILY
Status: DISCONTINUED | OUTPATIENT
Start: 2018-06-28 | End: 2018-07-05

## 2018-06-28 RX ORDER — SODIUM CHLORIDE 1000 MG
2 TABLET, SOLUBLE MISCELLANEOUS 3 TIMES DAILY
Status: DISCONTINUED | OUTPATIENT
Start: 2018-06-28 | End: 2018-07-05

## 2018-06-28 RX ADMIN — INSULIN LISPRO 3 UNITS: 100 INJECTION, SOLUTION INTRAVENOUS; SUBCUTANEOUS at 08:05

## 2018-06-28 RX ADMIN — INSULIN LISPRO 9 UNITS: 100 INJECTION, SOLUTION INTRAVENOUS; SUBCUTANEOUS at 23:46

## 2018-06-28 RX ADMIN — INSULIN LISPRO 3 UNITS: 100 INJECTION, SOLUTION INTRAVENOUS; SUBCUTANEOUS at 16:03

## 2018-06-28 RX ADMIN — VANCOMYCIN HYDROCHLORIDE 2000 MG: 10 INJECTION, POWDER, LYOPHILIZED, FOR SOLUTION INTRAVENOUS at 19:48

## 2018-06-28 RX ADMIN — LORAZEPAM 0.5 MG: 2 INJECTION INTRAMUSCULAR; INTRAVENOUS at 00:00

## 2018-06-28 RX ADMIN — VANCOMYCIN HYDROCHLORIDE 1.5 G: 10 INJECTION, POWDER, LYOPHILIZED, FOR SOLUTION INTRAVENOUS at 08:06

## 2018-06-28 RX ADMIN — INSULIN LISPRO 3 UNITS: 100 INJECTION, SOLUTION INTRAVENOUS; SUBCUTANEOUS at 04:51

## 2018-06-28 RX ADMIN — ACETAMINOPHEN 650 MG: 650 SOLUTION ORAL at 23:45

## 2018-06-28 RX ADMIN — OXYCODONE HYDROCHLORIDE 5 MG: 5 SOLUTION ORAL at 06:10

## 2018-06-28 RX ADMIN — Medication 10 ML: at 09:00

## 2018-06-28 RX ADMIN — ENOXAPARIN SODIUM 30 MG: 30 INJECTION SUBCUTANEOUS at 09:00

## 2018-06-28 RX ADMIN — PROPRANOLOL HYDROCHLORIDE 10 MG: 10 TABLET ORAL at 09:00

## 2018-06-28 RX ADMIN — POTASSIUM CHLORIDE 40 MEQ: 20 SOLUTION ORAL at 20:52

## 2018-06-28 RX ADMIN — CEFEPIME 2 G: 2 INJECTION, POWDER, FOR SOLUTION INTRAMUSCULAR; INTRAVENOUS at 23:44

## 2018-06-28 RX ADMIN — CEFEPIME HYDROCHLORIDE 2 G: 2 INJECTION, POWDER, FOR SOLUTION INTRAVENOUS at 08:06

## 2018-06-28 RX ADMIN — INSULIN LISPRO 6 UNITS: 100 INJECTION, SOLUTION INTRAVENOUS; SUBCUTANEOUS at 19:50

## 2018-06-28 RX ADMIN — SODIUM CHLORIDE, PRESERVATIVE FREE 300 UNITS: 5 INJECTION INTRAVENOUS at 09:00

## 2018-06-28 RX ADMIN — OXYCODONE HYDROCHLORIDE 5 MG: 5 SOLUTION ORAL at 01:58

## 2018-06-28 RX ADMIN — PANTOPRAZOLE SODIUM 40 MG: 40 GRANULE, DELAYED RELEASE ORAL at 06:10

## 2018-06-28 RX ADMIN — POTASSIUM CHLORIDE 40 MEQ: 20 SOLUTION ORAL at 09:00

## 2018-06-28 RX ADMIN — PROPRANOLOL HYDROCHLORIDE 20 MG: 20 TABLET ORAL at 20:52

## 2018-06-28 RX ADMIN — SODIUM CHLORIDE, PRESERVATIVE FREE 300 UNITS: 5 INJECTION INTRAVENOUS at 20:52

## 2018-06-28 RX ADMIN — SODIUM CHLORIDE TAB 1 GM 2 G: 1 TAB at 12:21

## 2018-06-28 RX ADMIN — ENOXAPARIN SODIUM 30 MG: 30 INJECTION SUBCUTANEOUS at 20:52

## 2018-06-28 RX ADMIN — OXYCODONE HYDROCHLORIDE 5 MG: 5 SOLUTION ORAL at 21:23

## 2018-06-28 RX ADMIN — PROPRANOLOL HYDROCHLORIDE 20 MG: 20 TABLET ORAL at 13:58

## 2018-06-28 RX ADMIN — INSULIN GLARGINE 20 UNITS: 100 INJECTION, SOLUTION SUBCUTANEOUS at 19:50

## 2018-06-28 RX ADMIN — CEFEPIME 2 G: 2 INJECTION, POWDER, FOR SOLUTION INTRAMUSCULAR; INTRAVENOUS at 16:04

## 2018-06-28 RX ADMIN — INSULIN GLARGINE 20 UNITS: 100 INJECTION, SOLUTION SUBCUTANEOUS at 08:05

## 2018-06-28 RX ADMIN — INSULIN LISPRO 3 UNITS: 100 INJECTION, SOLUTION INTRAVENOUS; SUBCUTANEOUS at 12:19

## 2018-06-28 RX ADMIN — SODIUM CHLORIDE TAB 1 GM 2 G: 1 TAB at 20:52

## 2018-06-28 RX ADMIN — Medication 10 ML: at 20:53

## 2018-06-28 ASSESSMENT — PAIN SCALES - GENERAL
PAINLEVEL_OUTOF10: 0
PAINLEVEL_OUTOF10: 0
PAINLEVEL_OUTOF10: 3
PAINLEVEL_OUTOF10: 3
PAINLEVEL_OUTOF10: 1
PAINLEVEL_OUTOF10: 3
PAINLEVEL_OUTOF10: 3
PAINLEVEL_OUTOF10: 0

## 2018-06-28 NOTE — CARE COORDINATION
Spoke with mom over phone re: pt's status. Discussed order for PM&R evaluation. She would prefer he go to [facility]. She works at [facility] a SNF in [employer] and chooses for him to go there. Spoke to Ra ( covering for [employer]) at [employer]. They do accept his insurance and can accept with a corpak if needed. Faxed facesheet, H&P, therapy notes to [phone].

## 2018-06-28 NOTE — PROGRESS NOTES
mouthwash at EOB; [place] A for task completion  Max A;    [place] to wash face while pt sitting on the EOB with Min A to increase sitting balance. Upper Body Dressing Max A  Mod A; To clemente/doff gown while in supine.       Lower Body Dressing Dependent Dep; To clemente socks.       Bathing Dependent N/T      Toileting  Dependent N/T;    Dep per last treatment.      Bed Mobility  Supine to Sit: Mod A  Sit to Supine: Dependent  Sup > Sit: Max A  Sit > Sup: Min A;    Pt required Max A to transfer from supine to sitting position with Min verbal prompting to increase safety. Pt sat up on the EOB for 15 minutes with Min A while completing exercises to increase strength and tolerance. Functional Transfers Sit to Stand: Max A  Stand to Sit: Max A  Sit <>Stand: Min A x2;    Min A of 2 to transfer from sit to standing position x2 with Mod verbal prompting due to leaning to R side with pt unable to extend R knee. Functional Mobility Max A   for side steps to [facility] N/A      Sit balance: Min A; Fair  Stand balance: Min A of 2; Poor  Endurance/Activity tolerance: poor+/Fair-; pt seems to need more processing time on this date. Comments: Upon arrival pt in supine with restraints applied. At end of session pt left in supine with HOB elevated, pillows under BUEs, restraints applied, all lines and tubes intact, and call light within reach. · Pt has made fair progress towards set goals.    · Continue with current plan of care    Time in: 1:35  Time out: 2:00  Total Tx Time: 25 mins    Emeka ALMAGUER/STEPHANIE [id number]

## 2018-06-28 NOTE — PROGRESS NOTES
BID  acetaminophen (TYLENOL) 160 MG/5ML solution 650 mg, 650 mg, Per NG tube, Q4H PRN  pantoprazole sodium (PROTONIX) packet 40 mg, 40 mg, Per NG tube, QAM AC  docusate sodium (COLACE) 150 MG/15ML liquid 100 mg, 100 mg, Per NG tube, BID  sennosides (SENOKOT) 8.8 MG/5ML syrup 5 mL, 5 mL, Per NG tube, Nightly  acetaminophen (TYLENOL) tablet 650 mg, 650 mg, Oral, Q4H PRN  ondansetron (ZOFRAN) injection 4 mg, 4 mg, Intravenous, Q6H PRN  glucose (GLUTOSE) 40 % oral gel 15 g, 15 g, Oral, PRN  dextrose 50 % solution 12.5 g, 12.5 g, Intravenous, PRN  glucagon (rDNA) injection 1 mg, 1 mg, Intramuscular, PRN  dextrose 5 % solution, 100 mL/hr, Intravenous, PRN  promethazine (PHENERGAN) injection 6.25 mg, 6.25 mg, Intravenous, Q6H PRN  sodium chloride flush 0.9 % injection 10 mL, 10 mL, Intravenous, 2 times per day  magnesium hydroxide (MILK OF MAGNESIA) 400 MG/5ML suspension 30 mL, 30 mL, Oral, Daily PRN  bisacodyl (DULCOLAX) suppository 10 mg, 10 mg, Rectal, Daily PRN  lubrifresh P.M. (artificial tears) ophthalmic ointment, , Both Eyes, Q2H PRN    ASSESSMENT AND PLAN:    Patient with traumatic SAH and intracranial contusion. Stable. Repeat head CT tomorrow.

## 2018-06-28 NOTE — PROGRESS NOTES
Physical Therapy  Facility/Department: [address] [facility]  Daily Treatment Note  NAME: Hazel Simmons  : 1970  MRN: [id number]    Date of Service: 2018   Evaluating Therapist: Karen Reynolds PT, DPT     ROOM #: [id number]  DIAGNOSIS: Trauma  PRECAUTIONS: Falls, 6L O2, NG tube, C-collar  - all imaging is negative, L ICH  PROCEDURES:  extubated     Social:  Pt lives with parents in a 1 floor plan with 2 step(s) and 1 rail(s) to enter. Prior to admission pt walked with no device and was Independent. Per pt's mother in chart, pt works odd jobs intermittently.       Initial Evaluation  Date: 18 Treatment  Date:   18  Short Term/ Long Term   Goals   AM-PAC 6 Clicks  0      Does pt have pain? No c/o pain C/o pain \"everywhere\"     Bed Mobility  Rolling: SBA  Supine to sit: ModA  Sit to supine: MaxA  Scooting: ModA Rolling: Min a  Supine to sit: Max A  Sit to supine: Min A   Scooting: Mod A SBA   Transfers Sit to stand: MaxA  Stand to sit: MaxA  Stand pivot: NT  Sit to stand; Min A x 2  Stand to sit: Min A x 2  Stand pivot: NT [place] with AAD   Ambulation   2 feet side stepping to [facility] with MaxA with no device  Attempted side stepping, pt unable with verbal cues >75 feet with [place] with AAD   Stair negotiation: ascended and descended NT  NT >4 steps with 1 rail with [place]   BLE ROM WNL       BLE strength Grossly 3+ to 4-/5   Increase by 1/3 MMT grade   Balance Sitting: ModA dynamic  Standing: MaxA no device  sitting: Min A Sitting: SBA  Standing: [place] with AAD        Patient education  Pt was educated on therex and transfers    Patient response to education:   Pt verbalized understanding Pt demonstrated skill Pt requires further education in this area   x x x     Additional Comments: Pt lethargic throughout treatment. C/o some dizziness seated EOB. . Pt ability to follow vc's was decreased from yesterdays session. Once seated EOB pt demosntrated increased trunk flexion.  Sit to stand transfer performed x

## 2018-06-28 NOTE — PROGRESS NOTES
[facility]  ATTENDING PHYSICIAN PROGRESS NOTE     I have examined the patient, reviewed the record, and discussed the case with the APN/  Resident. I have reviewed all relevant labs and imaging data. Please refer to the  APN/ resident's note. I agree with the  assessment and plan with the following corrections/ additions. The following summarizes my clinical findings and independent assessment. CC: fall    S. EXTUBATED YESTERDAY. He pulled out his NG tube overnight    O.  Awake and alert to self  Eyes open spontaneously  Sinus tachycardic  Abdomen soft nt nd    ASSESSMENT:  Principal Problem:    Trauma  Active Problems:    Subarachnoid hemorrhage (HCC)    Closed fracture of temporal bone (HCC)    Pneumonia of left lower lobe due to methicillin susceptible Staphylococcus aureus (MSSA) ([facility] [address]    Fall from ground level    Acute blood loss anemia    Insulin dependent diabetes mellitus ([facility] [address]    Acute respiratory failure following trauma and surgery ([facility] [address]  Resolved Problems:    * No resolved hospital problems.  *       PLAN:  SAH/ Traumatic Brain Injury--following commands  Neuro status improved  Off ativan  Change oxycodone to PRN  Repeat head ct tomorrow       Tachycardia --due to brain storming--increase propranolol 20 mg tid    Acute Resp insufficiency  Poor cough  Febrile to 100.2  Start empiric cefepime/ vanco  Repeat cx pending       Moderate calorie protein malnutrition--continue corpack  Speech exercises      FEN: overall fluid balance even  Hyponatremia  Will decrease free water flushes  Start salt tabs 2 g tid    DVT Proph: SCDS/ Lovenox      Continue ICU      Charity Dow MD, FACS  6/28/2018  10:05 AM

## 2018-06-28 NOTE — PROGRESS NOTES
Cefepime and Vancomycin day #1   · Consulted by Dr. Mahnaz Ross to dose/monitor Vancomycin  · Goal trough level:  15-20 mcg/mL  · SCr 0.8 today; UOP 1.6 mL/kg/hr    Plan:  · Pt was started on Vancomycin 1500 mg IV q12h. Will adjust dose to Vancomycin 2000 mg IV q12h to begin this evening. · Will order a Vancomycin trough level once pt reaches steady state and will adjust dose as needed  · Will monitor renal function closely      Will continue to follow. Thank you for the consult.     Kai Hood, PharmD, BCPS, BCCCP  6/28/2018  1:38 PM  Pager: [phone]

## 2018-06-29 ENCOUNTER — APPOINTMENT (OUTPATIENT)
Dept: CT IMAGING | Age: 48
DRG: 055 | End: 2018-06-29
Payer: COMMERCIAL

## 2018-06-29 LAB
ANION GAP SERPL CALCULATED.3IONS-SCNC: 10 MMOL/L (ref 7–16)
BACTERIA: ABNORMAL /HPF
BASOPHILS ABSOLUTE: 0.05 E9/L (ref 0–0.2)
BASOPHILS RELATIVE PERCENT: 0.4 % (ref 0–2)
BILIRUBIN URINE: NEGATIVE
BLOOD, URINE: ABNORMAL
BUN BLDV-MCNC: 25 MG/DL (ref 6–20)
CALCIUM SERPL-MCNC: 9.7 MG/DL (ref 8.6–10.2)
CHLORIDE BLD-SCNC: 90 MMOL/L (ref 98–107)
CLARITY: CLEAR
CO2: 38 MMOL/L (ref 22–29)
COLOR: YELLOW
CREAT SERPL-MCNC: 0.8 MG/DL (ref 0.7–1.2)
EOSINOPHILS ABSOLUTE: 0.48 E9/L (ref 0.05–0.5)
EOSINOPHILS RELATIVE PERCENT: 3.7 % (ref 0–6)
GFR AFRICAN AMERICAN: >60
GFR NON-AFRICAN AMERICAN: >60 ML/MIN/1.73
GLUCOSE BLD-MCNC: 131 MG/DL (ref 74–109)
GLUCOSE URINE: NEGATIVE MG/DL
HCT VFR BLD CALC: 37.9 % (ref 37–54)
HEMOGLOBIN: 12.3 G/DL (ref 12.5–16.5)
IMMATURE GRANULOCYTES #: 0.26 E9/L
IMMATURE GRANULOCYTES %: 2 % (ref 0–5)
KETONES, URINE: NEGATIVE MG/DL
LEUKOCYTE ESTERASE, URINE: NEGATIVE
LYMPHOCYTES ABSOLUTE: 2.27 E9/L (ref 1.5–4)
LYMPHOCYTES RELATIVE PERCENT: 17.4 % (ref 20–42)
MCH RBC QN AUTO: 27.5 PG (ref 26–35)
MCHC RBC AUTO-ENTMCNC: 32.5 % (ref 32–34.5)
MCV RBC AUTO: 84.8 FL (ref 80–99.9)
METER GLUCOSE: 109 MG/DL (ref 70–110)
METER GLUCOSE: 134 MG/DL (ref 70–110)
METER GLUCOSE: 155 MG/DL (ref 70–110)
METER GLUCOSE: 186 MG/DL (ref 70–110)
METER GLUCOSE: 187 MG/DL (ref 70–110)
METER GLUCOSE: 188 MG/DL (ref 70–110)
MONOCYTES ABSOLUTE: 1.53 E9/L (ref 0.1–0.95)
MONOCYTES RELATIVE PERCENT: 11.7 % (ref 2–12)
NEUTROPHILS ABSOLUTE: 8.44 E9/L (ref 1.8–7.3)
NEUTROPHILS RELATIVE PERCENT: 64.8 % (ref 43–80)
NITRITE, URINE: NEGATIVE
PDW BLD-RTO: 13.3 FL (ref 11.5–15)
PH UA: 6 (ref 5–9)
PLATELET # BLD: 428 E9/L (ref 130–450)
PMV BLD AUTO: 9.6 FL (ref 7–12)
POLYCHROMASIA: ABNORMAL
POTASSIUM SERPL-SCNC: 4.1 MMOL/L (ref 3.5–5)
PROTEIN UA: NEGATIVE MG/DL
RBC # BLD: 4.47 E12/L (ref 3.8–5.8)
RBC UA: >20 /HPF (ref 0–2)
SODIUM BLD-SCNC: 138 MMOL/L (ref 132–146)
SPECIFIC GRAVITY UA: 1.01 (ref 1–1.03)
UROBILINOGEN, URINE: 0.2 E.U./DL
WBC # BLD: 13 E9/L (ref 4.5–11.5)
WBC UA: ABNORMAL /HPF (ref 0–5)

## 2018-06-29 PROCEDURE — 6370000000 HC RX 637 (ALT 250 FOR IP): Performed by: STUDENT IN AN ORGANIZED HEALTH CARE EDUCATION/TRAINING PROGRAM

## 2018-06-29 PROCEDURE — 6360000002 HC RX W HCPCS: Performed by: STUDENT IN AN ORGANIZED HEALTH CARE EDUCATION/TRAINING PROGRAM

## 2018-06-29 PROCEDURE — 97535 SELF CARE MNGMENT TRAINING: CPT

## 2018-06-29 PROCEDURE — 2580000003 HC RX 258: Performed by: STUDENT IN AN ORGANIZED HEALTH CARE EDUCATION/TRAINING PROGRAM

## 2018-06-29 PROCEDURE — 2700000000 HC OXYGEN THERAPY PER DAY

## 2018-06-29 PROCEDURE — 80048 BASIC METABOLIC PNL TOTAL CA: CPT

## 2018-06-29 PROCEDURE — 6360000002 HC RX W HCPCS: Performed by: SURGERY

## 2018-06-29 PROCEDURE — 97112 NEUROMUSCULAR REEDUCATION: CPT

## 2018-06-29 PROCEDURE — 87070 CULTURE OTHR SPECIMN AEROBIC: CPT

## 2018-06-29 PROCEDURE — 99233 SBSQ HOSP IP/OBS HIGH 50: CPT | Performed by: SURGERY

## 2018-06-29 PROCEDURE — 87205 SMEAR GRAM STAIN: CPT

## 2018-06-29 PROCEDURE — 97110 THERAPEUTIC EXERCISES: CPT

## 2018-06-29 PROCEDURE — 36415 COLL VENOUS BLD VENIPUNCTURE: CPT

## 2018-06-29 PROCEDURE — 2580000003 HC RX 258: Performed by: SURGERY

## 2018-06-29 PROCEDURE — 85025 COMPLETE CBC W/AUTO DIFF WBC: CPT

## 2018-06-29 PROCEDURE — 70450 CT HEAD/BRAIN W/O DYE: CPT

## 2018-06-29 PROCEDURE — 2000000000 HC ICU R&B

## 2018-06-29 PROCEDURE — 82962 GLUCOSE BLOOD TEST: CPT

## 2018-06-29 PROCEDURE — 97530 THERAPEUTIC ACTIVITIES: CPT

## 2018-06-29 PROCEDURE — 6370000000 HC RX 637 (ALT 250 FOR IP): Performed by: SURGERY

## 2018-06-29 PROCEDURE — 81001 URINALYSIS AUTO W/SCOPE: CPT

## 2018-06-29 RX ORDER — LORAZEPAM 0.5 MG/1
0.5 TABLET ORAL ONCE
Status: COMPLETED | OUTPATIENT
Start: 2018-06-29 | End: 2018-06-29

## 2018-06-29 RX ORDER — INSULIN GLARGINE 100 [IU]/ML
30 INJECTION, SOLUTION SUBCUTANEOUS 2 TIMES DAILY
Status: DISCONTINUED | OUTPATIENT
Start: 2018-06-29 | End: 2018-07-03

## 2018-06-29 RX ORDER — RISPERIDONE 0.5 MG/1
0.5 TABLET, FILM COATED ORAL 2 TIMES DAILY
Status: DISCONTINUED | OUTPATIENT
Start: 2018-06-29 | End: 2018-06-30

## 2018-06-29 RX ORDER — LORAZEPAM 2 MG/ML
0.5 INJECTION INTRAMUSCULAR ONCE
Status: COMPLETED | OUTPATIENT
Start: 2018-06-29 | End: 2018-06-29

## 2018-06-29 RX ADMIN — OXYCODONE HYDROCHLORIDE 5 MG: 5 SOLUTION ORAL at 20:53

## 2018-06-29 RX ADMIN — INSULIN GLARGINE 30 UNITS: 100 INJECTION, SOLUTION SUBCUTANEOUS at 07:56

## 2018-06-29 RX ADMIN — SODIUM CHLORIDE TAB 1 GM 2 G: 1 TAB at 07:54

## 2018-06-29 RX ADMIN — CEFEPIME 2 G: 2 INJECTION, POWDER, FOR SOLUTION INTRAMUSCULAR; INTRAVENOUS at 23:51

## 2018-06-29 RX ADMIN — Medication 10 ML: at 07:55

## 2018-06-29 RX ADMIN — INSULIN LISPRO 3 UNITS: 100 INJECTION, SOLUTION INTRAVENOUS; SUBCUTANEOUS at 04:20

## 2018-06-29 RX ADMIN — INSULIN LISPRO 3 UNITS: 100 INJECTION, SOLUTION INTRAVENOUS; SUBCUTANEOUS at 23:56

## 2018-06-29 RX ADMIN — PROPRANOLOL HYDROCHLORIDE 20 MG: 20 TABLET ORAL at 07:54

## 2018-06-29 RX ADMIN — ENOXAPARIN SODIUM 30 MG: 30 INJECTION SUBCUTANEOUS at 20:53

## 2018-06-29 RX ADMIN — SODIUM CHLORIDE TAB 1 GM 2 G: 1 TAB at 20:52

## 2018-06-29 RX ADMIN — OXYCODONE HYDROCHLORIDE 5 MG: 5 SOLUTION ORAL at 04:30

## 2018-06-29 RX ADMIN — PANTOPRAZOLE SODIUM 40 MG: 40 GRANULE, DELAYED RELEASE ORAL at 06:50

## 2018-06-29 RX ADMIN — POTASSIUM CHLORIDE 40 MEQ: 20 SOLUTION ORAL at 20:59

## 2018-06-29 RX ADMIN — CEFEPIME 2 G: 2 INJECTION, POWDER, FOR SOLUTION INTRAMUSCULAR; INTRAVENOUS at 16:45

## 2018-06-29 RX ADMIN — POTASSIUM CHLORIDE 40 MEQ: 20 SOLUTION ORAL at 07:55

## 2018-06-29 RX ADMIN — SODIUM CHLORIDE, PRESERVATIVE FREE 300 UNITS: 5 INJECTION INTRAVENOUS at 07:54

## 2018-06-29 RX ADMIN — OXYCODONE HYDROCHLORIDE 5 MG: 5 SOLUTION ORAL at 13:55

## 2018-06-29 RX ADMIN — SODIUM CHLORIDE TAB 1 GM 2 G: 1 TAB at 13:55

## 2018-06-29 RX ADMIN — CEFEPIME 2 G: 2 INJECTION, POWDER, FOR SOLUTION INTRAMUSCULAR; INTRAVENOUS at 07:53

## 2018-06-29 RX ADMIN — RISPERIDONE 0.5 MG: 0.5 TABLET, FILM COATED ORAL at 21:11

## 2018-06-29 RX ADMIN — PROPRANOLOL HYDROCHLORIDE 20 MG: 20 TABLET ORAL at 13:55

## 2018-06-29 RX ADMIN — LORAZEPAM 0.5 MG: 0.5 TABLET ORAL at 03:43

## 2018-06-29 RX ADMIN — SODIUM CHLORIDE, PRESERVATIVE FREE 300 UNITS: 5 INJECTION INTRAVENOUS at 20:55

## 2018-06-29 RX ADMIN — OXYCODONE HYDROCHLORIDE 5 MG: 5 SOLUTION ORAL at 08:03

## 2018-06-29 RX ADMIN — LORAZEPAM 0.5 MG: 2 INJECTION INTRAMUSCULAR; INTRAVENOUS at 09:58

## 2018-06-29 RX ADMIN — Medication 10 ML: at 07:53

## 2018-06-29 RX ADMIN — Medication 10 ML: at 20:54

## 2018-06-29 RX ADMIN — ENOXAPARIN SODIUM 30 MG: 30 INJECTION SUBCUTANEOUS at 07:53

## 2018-06-29 RX ADMIN — INSULIN LISPRO 3 UNITS: 100 INJECTION, SOLUTION INTRAVENOUS; SUBCUTANEOUS at 19:56

## 2018-06-29 RX ADMIN — VANCOMYCIN HYDROCHLORIDE 2000 MG: 10 INJECTION, POWDER, LYOPHILIZED, FOR SOLUTION INTRAVENOUS at 08:12

## 2018-06-29 RX ADMIN — INSULIN LISPRO 3 UNITS: 100 INJECTION, SOLUTION INTRAVENOUS; SUBCUTANEOUS at 07:56

## 2018-06-29 RX ADMIN — PROPRANOLOL HYDROCHLORIDE 20 MG: 20 TABLET ORAL at 20:53

## 2018-06-29 RX ADMIN — INSULIN GLARGINE 30 UNITS: 100 INJECTION, SOLUTION SUBCUTANEOUS at 19:58

## 2018-06-29 ASSESSMENT — PAIN SCALES - GENERAL
PAINLEVEL_OUTOF10: 5
PAINLEVEL_OUTOF10: 0
PAINLEVEL_OUTOF10: 7
PAINLEVEL_OUTOF10: 5

## 2018-06-29 NOTE — PROGRESS NOTES
[facility]  ATTENDING PHYSICIAN PROGRESS NOTE     I have examined the patient, reviewed the record, and discussed the case with the APN/  Resident. I have reviewed all relevant labs and imaging data. Please refer to the  APN/ resident's note. I agree with the  assessment and plan with the following corrections/ additions. The following summarizes my clinical findings and independent assessment. CC: fall    S. Pt has no complaints. He wants to drink water. O.  Vitals:    06/29/18 0800   BP: (!) 143/91   Pulse: 105   Resp: 16   Temp: 99 °F (37.2 °C)   SpO2: 96%       Awake and alert to self  Corpak present  Lungs coarse  Sinus tachycardic  Abdomen soft nt nd    ASSESSMENT:  Principal Problem:    Trauma  Active Problems:    Subarachnoid hemorrhage (HCC)    Closed fracture of temporal bone (HCC)    Pneumonia of left lower lobe due to methicillin susceptible Staphylococcus aureus (MSSA) ([facility] [address]    Fall from ground level    Acute blood loss anemia    Insulin dependent diabetes mellitus (HCC)    Acute respiratory failure following trauma and surgery ([facility] [address]  Resolved Problems:    * No resolved hospital problems.  *       PLAN:  SAH/ Traumatic Brain Injury--following commands  Neuro status improved    oxycodone  PRN  Repeat head ct today         Tachycardia --due to brain storming-- propranolol 20 mg tid    Acute Resp insufficiency  Poor cough  Afebrile   empiric cefepime/ vanco  Will dc vanco--low probability of MRSA in sputum  Repeat cx pending       Moderate calorie protein malnutrition--continue corpack  Speech exercises      FEN: overall fluid balance even  Hyponatremia resolving--sodium 138      salt tabs 2 g tid    DVT Proph: SCDS/ Lovenox      Continue ICU      Dayana Hussein MD, FACS  6/29/2018  8:31 AM

## 2018-06-29 NOTE — PROGRESS NOTES
Units, 3 mL, Intravenous, 2 times per day  heparin flush 100 UNIT/ML injection 300 Units, 3 mL, Intercatheter, PRN  enoxaparin (LOVENOX) injection 30 mg, 30 mg, Subcutaneous, BID  acetaminophen (TYLENOL) 160 MG/5ML solution 650 mg, 650 mg, Per NG tube, Q4H PRN  pantoprazole sodium (PROTONIX) packet 40 mg, 40 mg, Per NG tube, QAM AC  docusate sodium (COLACE) 150 MG/15ML liquid 100 mg, 100 mg, Per NG tube, BID  sennosides (SENOKOT) 8.8 MG/5ML syrup 5 mL, 5 mL, Per NG tube, Nightly  acetaminophen (TYLENOL) tablet 650 mg, 650 mg, Oral, Q4H PRN  ondansetron (ZOFRAN) injection 4 mg, 4 mg, Intravenous, Q6H PRN  glucose (GLUTOSE) 40 % oral gel 15 g, 15 g, Oral, PRN  dextrose 50 % solution 12.5 g, 12.5 g, Intravenous, PRN  glucagon (rDNA) injection 1 mg, 1 mg, Intramuscular, PRN  dextrose 5 % solution, 100 mL/hr, Intravenous, PRN  promethazine (PHENERGAN) injection 6.25 mg, 6.25 mg, Intravenous, Q6H PRN  sodium chloride flush 0.9 % injection 10 mL, 10 mL, Intravenous, 2 times per day  magnesium hydroxide (MILK OF MAGNESIA) 400 MG/5ML suspension 30 mL, 30 mL, Oral, Daily PRN  bisacodyl (DULCOLAX) suppository 10 mg, 10 mg, Rectal, Daily PRN  lubrifresh P.M. (artificial tears) ophthalmic ointment, , Both Eyes, Q2H PRN    ASSESSMENT AND PLAN:  Patient with traumatic SAH and intracranial contusion. Stable.     -Prophylactic lovenox only  -No neurosurgical intervention  -Rehab  -f/u with Dr. Matthias Newton in 4 weeks with repeat head CT

## 2018-06-29 NOTE — CARE COORDINATION
6/29/2018  Faxed updated PT/OT notes and PAS/RR to [facility] and they will start precert. SW will follow and assist with transition of care.

## 2018-06-29 NOTE — PROGRESS NOTES
Pressure post session 149/87 mmHg   Heart Rate post session 119   SPO2 post session 99%         Comments: Pt was supine upon PT arrival and agreeable to PT evaluation/treatment. Pt required increased time to complete mobility. Pt responding to less commands appropriately this session likely due to increased sedation given for procedure prior to PT arrival. Pt impulsive with mobility and very mobile in supine constantly moving LEs. Pt was guided through TE per above and cued for safety. Pt unable to advance LEs to take steps despite cues and assistance with weight shifting. At end of session pt supine with call light within reach and all needs met, restraints applied and bed alarm armed. Patient education  Pt was educated on see above     Patient response to education:   Pt verbalized understanding Pt demonstrated skill Pt requires further education in this area   Minimally  minimally Yes        Time in: 1020  Time out: 1045    Pt is making  progress toward established Physical Therapy goals. Continue with physical therapy current plan of care.     Jimmie Mejía DPT  License Number: [id number]

## 2018-06-29 NOTE — PLAN OF CARE
Problem: Restraint Use - Nonviolent/Non-Self-Destructive Behavior:  Goal: Absence of restraint indications  Absence of restraint indications   Outcome: Not Met This Shift  Pt continues to reach for tubes and lines, does not respond to verbal redirection or distractions  Goal: Absence of restraint-related injury  Absence of restraint-related injury   Outcome: Met This Shift      Problem: Confusion - Acute:  Goal: Absence of continued neurological deterioration signs and symptoms  Absence of continued neurological deterioration signs and symptoms   Outcome: Met This Shift    Goal: Mental status will be restored to baseline  Mental status will be restored to baseline   Outcome: Not Met This Shift      Problem: Nutrition  Goal: Optimal nutrition therapy  Outcome: Met This Shift  PT tolerating tube feedings    Problem: Neurological  Goal: Maximum potential motor/sensory/cognitive function  Outcome: Not Met This Shift

## 2018-06-29 NOTE — PROGRESS NOTES
Assumed patient care after receiving report from [place]. Patient in bed, awake, alert and oriented x4, watching tv. No chest pain noted. Vital signs taken, WDL. Call light placed in reach. Bed in low position. Denies needs at this time. HOB elevated to 30 degrees. 0312 - Patient ambulate to bathroom, no BM noted. Back to bed, now with pain on lower abdomen and groin. Will give pain medicine. Patient been NPO after midnight for duplex abdomen. Occupational Therapy  OT BEDSIDE TREATMENT NOTE      Date:2018  Patient Name: Carl Cortes  MRN: [id number]  : 1970  Room: [address] [place], [place] [address]     Evaluating OT: Billy Stanford OTR/L     AM-PAC Daily Activity Raw Score:   G-Code: CM     Recommended Adaptive Equipment: To be determined      Diagnosis: Trauma; L ICH  Pertinent Medical History: DM     Precautions: falls, NGT, c-collar for comfort (all imaging clear), O2     Home Living: Pt questionable historian; pt lives with mother and father in a 1 story home with 2 stairs to enter and one rail; bed/bath on main level  Bathroom setup: walk-in shower with no safety bars; standard commode  Equipment owned: None     Prior Level of Function: Independent with ADLs and with IADLs; using no device for ambulation. Driving: yes  Occupation: works \"odd jobs\"    Pain: Pt c/o pain however not rated. Cognition: Pt shows poor alertness and orientation questionable due to limited verbal responses. Pt medicated for testing today per nurse and unable to completely follow commands. Goals:   GOAL 1: Pt will improve UB dressing/bathing to min A while seated EOB   GOAL 2: Pt will improve LB dressing/bathing to min A with use of AE PRN  GOAL 3: Pt will improve all functional transfers to min A from standard surface height  GOAL 4: Pt will improve grooming/hygiene tasks to SBA while seated   GOAL 5: Pt will improve self feeding task to modified independent when medically appropriate  GOAL 6: Pt will demo G- activity tolerance/endurance during 15-20 min ADL task   GOAL 7: Pt will demo G- tolerance for PROM and AAROM in all planes to improve BUE strength/functioning for ADL tasks  GOAL 8: Pt will demo G- dynamic sitting balance EOB during ADL tasks  GOAL 9: Pt will correctly squeeze hand on the letter A during the sequence CASABLANCA 0-1 errors.      Functional Assessment:    Initial Status 18 Current status    Feeding  Dependent  NGT  NGT   Grooming  Max A  Attempted oral

## 2018-06-29 NOTE — PLAN OF CARE
Problem: Falls - Risk of:  Goal: Will remain free from falls  Will remain free from falls   Outcome: Met This Shift      Problem: Restraint Use - Nonviolent/Non-Self-Destructive Behavior:  Goal: Absence of restraint indications  Absence of restraint indications   Outcome: Not Met This Shift    Goal: Absence of restraint-related injury  Absence of restraint-related injury   Outcome: Met This Shift      Problem: Confusion - Acute:  Goal: Absence of continued neurological deterioration signs and symptoms  Absence of continued neurological deterioration signs and symptoms   Outcome: Not Met This Shift      Comments: Plan of care discussed with patient/family. Patient/family incorporated into plan of care.

## 2018-06-30 LAB
ANION GAP SERPL CALCULATED.3IONS-SCNC: 10 MMOL/L (ref 7–16)
BASOPHILS ABSOLUTE: 0.05 E9/L (ref 0–0.2)
BASOPHILS RELATIVE PERCENT: 0.4 % (ref 0–2)
BUN BLDV-MCNC: 28 MG/DL (ref 6–20)
CALCIUM SERPL-MCNC: 9.6 MG/DL (ref 8.6–10.2)
CHLORIDE BLD-SCNC: 95 MMOL/L (ref 98–107)
CO2: 38 MMOL/L (ref 22–29)
CREAT SERPL-MCNC: 0.8 MG/DL (ref 0.7–1.2)
EOSINOPHILS ABSOLUTE: 0.43 E9/L (ref 0.05–0.5)
EOSINOPHILS RELATIVE PERCENT: 3.3 % (ref 0–6)
GFR AFRICAN AMERICAN: >60
GFR NON-AFRICAN AMERICAN: >60 ML/MIN/1.73
GLUCOSE BLD-MCNC: 136 MG/DL (ref 74–109)
HCT VFR BLD CALC: 37.2 % (ref 37–54)
HEMOGLOBIN: 12.1 G/DL (ref 12.5–16.5)
IMMATURE GRANULOCYTES #: 0.19 E9/L
IMMATURE GRANULOCYTES %: 1.4 % (ref 0–5)
LYMPHOCYTES ABSOLUTE: 2.22 E9/L (ref 1.5–4)
LYMPHOCYTES RELATIVE PERCENT: 16.9 % (ref 20–42)
MCH RBC QN AUTO: 27.6 PG (ref 26–35)
MCHC RBC AUTO-ENTMCNC: 32.5 % (ref 32–34.5)
MCV RBC AUTO: 84.9 FL (ref 80–99.9)
METER GLUCOSE: 107 MG/DL (ref 70–110)
METER GLUCOSE: 111 MG/DL (ref 70–110)
METER GLUCOSE: 121 MG/DL (ref 70–110)
METER GLUCOSE: 156 MG/DL (ref 70–110)
METER GLUCOSE: 190 MG/DL (ref 70–110)
METER GLUCOSE: 251 MG/DL (ref 70–110)
MONOCYTES ABSOLUTE: 1.59 E9/L (ref 0.1–0.95)
MONOCYTES RELATIVE PERCENT: 12.1 % (ref 2–12)
NEUTROPHILS ABSOLUTE: 8.68 E9/L (ref 1.8–7.3)
NEUTROPHILS RELATIVE PERCENT: 65.9 % (ref 43–80)
OVALOCYTES: ABNORMAL
PDW BLD-RTO: 13.1 FL (ref 11.5–15)
PLATELET # BLD: 425 E9/L (ref 130–450)
PMV BLD AUTO: 9.7 FL (ref 7–12)
POIKILOCYTES: ABNORMAL
POLYCHROMASIA: ABNORMAL
POTASSIUM SERPL-SCNC: 4.1 MMOL/L (ref 3.5–5)
RBC # BLD: 4.38 E12/L (ref 3.8–5.8)
SODIUM BLD-SCNC: 143 MMOL/L (ref 132–146)
TEAR DROP CELLS: ABNORMAL
WBC # BLD: 13.2 E9/L (ref 4.5–11.5)

## 2018-06-30 PROCEDURE — 2700000000 HC OXYGEN THERAPY PER DAY

## 2018-06-30 PROCEDURE — 82962 GLUCOSE BLOOD TEST: CPT

## 2018-06-30 PROCEDURE — 2580000003 HC RX 258: Performed by: SURGERY

## 2018-06-30 PROCEDURE — 6360000002 HC RX W HCPCS: Performed by: STUDENT IN AN ORGANIZED HEALTH CARE EDUCATION/TRAINING PROGRAM

## 2018-06-30 PROCEDURE — 6370000000 HC RX 637 (ALT 250 FOR IP): Performed by: STUDENT IN AN ORGANIZED HEALTH CARE EDUCATION/TRAINING PROGRAM

## 2018-06-30 PROCEDURE — 99232 SBSQ HOSP IP/OBS MODERATE 35: CPT | Performed by: NEUROLOGICAL SURGERY

## 2018-06-30 PROCEDURE — 6370000000 HC RX 637 (ALT 250 FOR IP): Performed by: SURGERY

## 2018-06-30 PROCEDURE — 85025 COMPLETE CBC W/AUTO DIFF WBC: CPT

## 2018-06-30 PROCEDURE — 2000000000 HC ICU R&B

## 2018-06-30 PROCEDURE — 36415 COLL VENOUS BLD VENIPUNCTURE: CPT

## 2018-06-30 PROCEDURE — 2580000003 HC RX 258: Performed by: STUDENT IN AN ORGANIZED HEALTH CARE EDUCATION/TRAINING PROGRAM

## 2018-06-30 PROCEDURE — 99233 SBSQ HOSP IP/OBS HIGH 50: CPT | Performed by: SURGERY

## 2018-06-30 PROCEDURE — 6360000002 HC RX W HCPCS: Performed by: SURGERY

## 2018-06-30 PROCEDURE — 80048 BASIC METABOLIC PNL TOTAL CA: CPT

## 2018-06-30 RX ORDER — RISPERIDONE 1 MG/1
1 TABLET, FILM COATED ORAL 2 TIMES DAILY
Status: DISCONTINUED | OUTPATIENT
Start: 2018-06-30 | End: 2018-07-01

## 2018-06-30 RX ADMIN — SODIUM CHLORIDE TAB 1 GM 2 G: 1 TAB at 21:18

## 2018-06-30 RX ADMIN — CEFEPIME 2 G: 2 INJECTION, POWDER, FOR SOLUTION INTRAMUSCULAR; INTRAVENOUS at 16:06

## 2018-06-30 RX ADMIN — SODIUM CHLORIDE, PRESERVATIVE FREE 300 UNITS: 5 INJECTION INTRAVENOUS at 21:26

## 2018-06-30 RX ADMIN — INSULIN LISPRO 9 UNITS: 100 INJECTION, SOLUTION INTRAVENOUS; SUBCUTANEOUS at 08:23

## 2018-06-30 RX ADMIN — Medication 10 ML: at 21:00

## 2018-06-30 RX ADMIN — INSULIN LISPRO 3 UNITS: 100 INJECTION, SOLUTION INTRAVENOUS; SUBCUTANEOUS at 19:53

## 2018-06-30 RX ADMIN — POTASSIUM CHLORIDE 40 MEQ: 20 SOLUTION ORAL at 21:21

## 2018-06-30 RX ADMIN — OXYCODONE HYDROCHLORIDE 5 MG: 5 SOLUTION ORAL at 14:19

## 2018-06-30 RX ADMIN — OXYCODONE HYDROCHLORIDE 5 MG: 5 SOLUTION ORAL at 05:38

## 2018-06-30 RX ADMIN — OXYCODONE HYDROCHLORIDE 5 MG: 5 SOLUTION ORAL at 19:20

## 2018-06-30 RX ADMIN — POTASSIUM CHLORIDE 40 MEQ: 20 SOLUTION ORAL at 08:41

## 2018-06-30 RX ADMIN — INSULIN GLARGINE 30 UNITS: 100 INJECTION, SOLUTION SUBCUTANEOUS at 08:23

## 2018-06-30 RX ADMIN — SODIUM CHLORIDE, PRESERVATIVE FREE 300 UNITS: 5 INJECTION INTRAVENOUS at 08:40

## 2018-06-30 RX ADMIN — SODIUM CHLORIDE TAB 1 GM 2 G: 1 TAB at 08:32

## 2018-06-30 RX ADMIN — DOCUSATE SODIUM 100 MG: 50 LIQUID ORAL at 21:18

## 2018-06-30 RX ADMIN — CEFEPIME 2 G: 2 INJECTION, POWDER, FOR SOLUTION INTRAMUSCULAR; INTRAVENOUS at 08:27

## 2018-06-30 RX ADMIN — ENOXAPARIN SODIUM 30 MG: 30 INJECTION SUBCUTANEOUS at 21:17

## 2018-06-30 RX ADMIN — Medication 5 ML: at 21:44

## 2018-06-30 RX ADMIN — PROPRANOLOL HYDROCHLORIDE 20 MG: 20 TABLET ORAL at 21:18

## 2018-06-30 RX ADMIN — PROMETHAZINE HYDROCHLORIDE 6.25 MG: 25 INJECTION INTRAMUSCULAR; INTRAVENOUS at 15:35

## 2018-06-30 RX ADMIN — Medication 10 ML: at 08:34

## 2018-06-30 RX ADMIN — RISPERIDONE 0.5 MG: 0.5 TABLET, FILM COATED ORAL at 08:31

## 2018-06-30 RX ADMIN — PROPRANOLOL HYDROCHLORIDE 20 MG: 20 TABLET ORAL at 14:08

## 2018-06-30 RX ADMIN — PROPRANOLOL HYDROCHLORIDE 20 MG: 20 TABLET ORAL at 08:31

## 2018-06-30 RX ADMIN — RISPERIDONE 1 MG: 1 TABLET, FILM COATED ORAL at 21:18

## 2018-06-30 RX ADMIN — SODIUM CHLORIDE TAB 1 GM 2 G: 1 TAB at 14:09

## 2018-06-30 RX ADMIN — PANTOPRAZOLE SODIUM 40 MG: 40 GRANULE, DELAYED RELEASE ORAL at 05:38

## 2018-06-30 RX ADMIN — INSULIN GLARGINE 30 UNITS: 100 INJECTION, SOLUTION SUBCUTANEOUS at 19:56

## 2018-06-30 RX ADMIN — DOCUSATE SODIUM 100 MG: 50 LIQUID ORAL at 08:32

## 2018-06-30 RX ADMIN — OXYCODONE HYDROCHLORIDE 5 MG: 5 SOLUTION ORAL at 10:18

## 2018-06-30 RX ADMIN — ENOXAPARIN SODIUM 30 MG: 30 INJECTION SUBCUTANEOUS at 08:32

## 2018-06-30 RX ADMIN — OXYCODONE HYDROCHLORIDE 5 MG: 5 SOLUTION ORAL at 23:44

## 2018-06-30 ASSESSMENT — PAIN SCALES - GENERAL
PAINLEVEL_OUTOF10: 8
PAINLEVEL_OUTOF10: 4
PAINLEVEL_OUTOF10: 8
PAINLEVEL_OUTOF10: 8
PAINLEVEL_OUTOF10: 0
PAINLEVEL_OUTOF10: 8

## 2018-06-30 NOTE — PROGRESS NOTES
Department of Neurosurgery  Attending Progress Note    CHIEF COMPLAINT: seen for [facility] and intracranial contusion.     SUBJECTIVE:  He denies headache this morning    ROS:    OBJECTIVE  Physical  VITALS:  /83   Pulse 106   Temp 99 °F (37.2 °C)   Resp 26   Ht 6' 2\" (1.88 m)   Wt 159 lb (72.1 kg)   SpO2 97%   BMI 20.41 kg/m²   NEUROLOGIC:  Awake and alert, PERRLA, Oriented to person, DOUGLAS 4/5     Data  CBC:   Lab Results   Component Value Date    WBC 13.2 06/30/2018    RBC 4.38 06/30/2018    HGB 12.1 06/30/2018    HCT 37.2 06/30/2018    MCV 84.9 06/30/2018    MCH 27.6 06/30/2018    MCHC 32.5 06/30/2018    RDW 13.1 06/30/2018     06/30/2018    MPV 9.7 06/30/2018     BMP:    Lab Results   Component Value Date     06/30/2018    K 4.1 06/30/2018    K 3.6 06/22/2018    CL 95 06/30/2018    CO2 38 06/30/2018    BUN 28 06/30/2018    LABALBU 2.9 06/22/2018    CREATININE 0.8 06/30/2018    CALCIUM 9.6 06/30/2018    GFRAA >60 06/30/2018    LABGLOM >60 06/30/2018    GLUCOSE 136 06/30/2018     Current Inpatient Medications  Current Facility-Administered Medications: insulin glargine (LANTUS) injection vial 30 Units, 30 Units, Subcutaneous, BID  risperiDONE (RISPERDAL) tablet 0.5 mg, 0.5 mg, Oral, BID  cefepime (MAXIPIME) 2 g IVPB minibag, 2 g, Intravenous, Q8H  propranolol (INDERAL) tablet 20 mg, 20 mg, Oral, TID  sodium chloride tablet 2 g, 2 g, Oral, TID  oxyCODONE (ROXICODONE) 5 MG/5ML solution 5 mg, 5 mg, Oral, Q4H PRN  insulin lispro (HUMALOG) injection vial 0-18 Units, 0-18 Units, Subcutaneous, Q4H  potassium chloride 20 MEQ/15ML (10%) oral solution 40 mEq, 40 mEq, Oral, BID  medicated lip balm (BLISTEX/CARMEX) stick, , Topical, PRN  sodium chloride flush 0.9 % injection 10 mL, 10 mL, Intravenous, PRN  heparin flush 100 UNIT/ML injection 300 Units, 3 mL, Intravenous, 2 times per day  heparin flush 100 UNIT/ML injection 300 Units, 3 mL, Intercatheter, PRN  enoxaparin (LOVENOX) injection 30 mg, 30 mg, Subcutaneous, BID  acetaminophen (TYLENOL) 160 MG/5ML solution 650 mg, 650 mg, Per NG tube, Q4H PRN  pantoprazole sodium (PROTONIX) packet 40 mg, 40 mg, Per NG tube, QAM AC  docusate sodium (COLACE) 150 MG/15ML liquid 100 mg, 100 mg, Per NG tube, BID  sennosides (SENOKOT) 8.8 MG/5ML syrup 5 mL, 5 mL, Per NG tube, Nightly  acetaminophen (TYLENOL) tablet 650 mg, 650 mg, Oral, Q4H PRN  ondansetron (ZOFRAN) injection 4 mg, 4 mg, Intravenous, Q6H PRN  glucose (GLUTOSE) 40 % oral gel 15 g, 15 g, Oral, PRN  dextrose 50 % solution 12.5 g, 12.5 g, Intravenous, PRN  glucagon (rDNA) injection 1 mg, 1 mg, Intramuscular, PRN  dextrose 5 % solution, 100 mL/hr, Intravenous, PRN  promethazine (PHENERGAN) injection 6.25 mg, 6.25 mg, Intravenous, Q6H PRN  sodium chloride flush 0.9 % injection 10 mL, 10 mL, Intravenous, 2 times per day  magnesium hydroxide (MILK OF MAGNESIA) 400 MG/5ML suspension 30 mL, 30 mL, Oral, Daily PRN  bisacodyl (DULCOLAX) suppository 10 mg, 10 mg, Rectal, Daily PRN  lubrifresh P.M. (artificial tears) ophthalmic ointment, , Both Eyes, Q2H PRN    ASSESSMENT AND PLAN:    Patient with SAH and intracranial contusion. Stable.   Will follow    Rylee Dennis

## 2018-06-30 NOTE — PROGRESS NOTES
propranolol  Monitor hemodynamics      · Pulm: Intubated 6/16 for airway protection, extubated 6/26  Monitor RR & SpO2 , wean O2  Bronchodilators  RLL PNA,cefepime      · GI:   Protein calorie malnutrition, severe oropharyngeal dysphagia  SLP recommends NPO, corepak placed with TF    · Renal:   Hyponatremia, resolved, on salt tabs  Monitor electrolytes & replace as needed    Monitor urine output    · ID:   RLL PNA, MSSA, finished 7 D ancef course,  Remains febrile with leukocytosis, cefepime started  Repeat BCs and resp cx pending,  on cefepime D2,       · Endocrine:     Hyperglycemia-cont, HX of diabetes. Hypoglycemia resolved- 30 lantus BID    Monitor BG    · MSK:   No acute issues   PT/OT consult     · Heme:   No acute issues    Monitor CBC      Bowel regimen: MOM, colace, senna, dulcolax  Pain control/Sedation:  Tylenol, Roxicodone, ativan  DVT prophylaxis: SCDs, lovenox     GI: Protonix. Glucose protocol:  ISS, lantus   Mouth/Eye care: As needed  Cifuentes: Keep in place for critical care monitoring of fluid balance.     Code status:    Full Code    Patient/Family update: as available    Disposition:  Continue current care    Electronically signed by Jose Kennedy MD on 6/30/2018 at 10:27 AM

## 2018-06-30 NOTE — PLAN OF CARE
Problem: Falls - Risk of:  Goal: Absence of physical injury  Absence of physical injury   Outcome: Met This Shift      Problem: Risk for Impaired Skin Integrity  Goal: Tissue integrity - skin and mucous membranes  Structural intactness and normal physiological function of skin and  mucous membranes.    Outcome: Met This Shift      Problem: Restraint Use - Nonviolent/Non-Self-Destructive Behavior:  Goal: Absence of restraint indications  Absence of restraint indications   Outcome: Not Met This Shift    Goal: Absence of restraint-related injury  Absence of restraint-related injury   Outcome: Met This Shift      Problem: Confusion - Acute:  Goal: Absence of continued neurological deterioration signs and symptoms  Absence of continued neurological deterioration signs and symptoms   Outcome: Met This Shift    Goal: Mental status will be restored to baseline  Mental status will be restored to baseline   Outcome: Not Met This Shift

## 2018-07-01 LAB
ANION GAP SERPL CALCULATED.3IONS-SCNC: 9 MMOL/L (ref 7–16)
BASOPHILS ABSOLUTE: 0 E9/L (ref 0–0.2)
BASOPHILS RELATIVE PERCENT: 0.4 % (ref 0–2)
BUN BLDV-MCNC: 33 MG/DL (ref 6–20)
CALCIUM SERPL-MCNC: 9.5 MG/DL (ref 8.6–10.2)
CHLORIDE BLD-SCNC: 95 MMOL/L (ref 98–107)
CO2: 38 MMOL/L (ref 22–29)
CREAT SERPL-MCNC: 0.9 MG/DL (ref 0.7–1.2)
CULTURE, RESPIRATORY: NORMAL
EOSINOPHILS ABSOLUTE: 0.45 E9/L (ref 0.05–0.5)
EOSINOPHILS RELATIVE PERCENT: 4.5 % (ref 0–6)
GFR AFRICAN AMERICAN: >60
GFR NON-AFRICAN AMERICAN: >60 ML/MIN/1.73
GLUCOSE BLD-MCNC: 151 MG/DL (ref 74–109)
HCT VFR BLD CALC: 37.3 % (ref 37–54)
HEMOGLOBIN: 11.9 G/DL (ref 12.5–16.5)
LYMPHOCYTES ABSOLUTE: 1.82 E9/L (ref 1.5–4)
LYMPHOCYTES RELATIVE PERCENT: 18 % (ref 20–42)
MCH RBC QN AUTO: 27.4 PG (ref 26–35)
MCHC RBC AUTO-ENTMCNC: 31.9 % (ref 32–34.5)
MCV RBC AUTO: 85.9 FL (ref 80–99.9)
METER GLUCOSE: 100 MG/DL (ref 70–110)
METER GLUCOSE: 193 MG/DL (ref 70–110)
METER GLUCOSE: 202 MG/DL (ref 70–110)
METER GLUCOSE: 207 MG/DL (ref 70–110)
METER GLUCOSE: 213 MG/DL (ref 70–110)
METER GLUCOSE: 72 MG/DL (ref 70–110)
MONOCYTES ABSOLUTE: 2.12 E9/L (ref 0.1–0.95)
MONOCYTES RELATIVE PERCENT: 20.7 % (ref 2–12)
MYELOCYTE PERCENT: 0.9 % (ref 0–0)
NEUTROPHILS ABSOLUTE: 5.76 E9/L (ref 1.8–7.3)
NEUTROPHILS RELATIVE PERCENT: 55.9 % (ref 43–80)
PDW BLD-RTO: 13.2 FL (ref 11.5–15)
PLATELET # BLD: 408 E9/L (ref 130–450)
PMV BLD AUTO: 9.7 FL (ref 7–12)
POIKILOCYTES: ABNORMAL
POTASSIUM SERPL-SCNC: 4.5 MMOL/L (ref 3.5–5)
RBC # BLD: 4.34 E12/L (ref 3.8–5.8)
SMEAR, RESPIRATORY: NORMAL
SODIUM BLD-SCNC: 142 MMOL/L (ref 132–146)
WBC # BLD: 10.1 E9/L (ref 4.5–11.5)

## 2018-07-01 PROCEDURE — 6360000002 HC RX W HCPCS: Performed by: STUDENT IN AN ORGANIZED HEALTH CARE EDUCATION/TRAINING PROGRAM

## 2018-07-01 PROCEDURE — 82962 GLUCOSE BLOOD TEST: CPT

## 2018-07-01 PROCEDURE — 36415 COLL VENOUS BLD VENIPUNCTURE: CPT

## 2018-07-01 PROCEDURE — 6370000000 HC RX 637 (ALT 250 FOR IP): Performed by: STUDENT IN AN ORGANIZED HEALTH CARE EDUCATION/TRAINING PROGRAM

## 2018-07-01 PROCEDURE — 97530 THERAPEUTIC ACTIVITIES: CPT

## 2018-07-01 PROCEDURE — 2000000000 HC ICU R&B

## 2018-07-01 PROCEDURE — 2580000003 HC RX 258: Performed by: SURGERY

## 2018-07-01 PROCEDURE — 2700000000 HC OXYGEN THERAPY PER DAY

## 2018-07-01 PROCEDURE — 99233 SBSQ HOSP IP/OBS HIGH 50: CPT | Performed by: SURGERY

## 2018-07-01 PROCEDURE — 85025 COMPLETE CBC W/AUTO DIFF WBC: CPT

## 2018-07-01 PROCEDURE — 6360000002 HC RX W HCPCS: Performed by: SURGERY

## 2018-07-01 PROCEDURE — 6370000000 HC RX 637 (ALT 250 FOR IP): Performed by: SURGERY

## 2018-07-01 PROCEDURE — 2580000003 HC RX 258: Performed by: STUDENT IN AN ORGANIZED HEALTH CARE EDUCATION/TRAINING PROGRAM

## 2018-07-01 PROCEDURE — 80048 BASIC METABOLIC PNL TOTAL CA: CPT

## 2018-07-01 RX ORDER — RISPERIDONE 2 MG/1
2 TABLET, FILM COATED ORAL 2 TIMES DAILY
Status: DISCONTINUED | OUTPATIENT
Start: 2018-07-01 | End: 2018-07-04

## 2018-07-01 RX ADMIN — PROPRANOLOL HYDROCHLORIDE 20 MG: 20 TABLET ORAL at 21:01

## 2018-07-01 RX ADMIN — RISPERIDONE 2 MG: 2 TABLET, FILM COATED ORAL at 21:00

## 2018-07-01 RX ADMIN — Medication 10 ML: at 07:58

## 2018-07-01 RX ADMIN — POTASSIUM CHLORIDE 40 MEQ: 20 SOLUTION ORAL at 21:02

## 2018-07-01 RX ADMIN — CEFEPIME 2 G: 2 INJECTION, POWDER, FOR SOLUTION INTRAMUSCULAR; INTRAVENOUS at 07:57

## 2018-07-01 RX ADMIN — INSULIN GLARGINE 30 UNITS: 100 INJECTION, SOLUTION SUBCUTANEOUS at 07:57

## 2018-07-01 RX ADMIN — OXYCODONE HYDROCHLORIDE 5 MG: 5 SOLUTION ORAL at 23:33

## 2018-07-01 RX ADMIN — SODIUM CHLORIDE, PRESERVATIVE FREE 300 UNITS: 5 INJECTION INTRAVENOUS at 07:58

## 2018-07-01 RX ADMIN — PROMETHAZINE HYDROCHLORIDE 6.25 MG: 25 INJECTION INTRAMUSCULAR; INTRAVENOUS at 03:07

## 2018-07-01 RX ADMIN — PROPRANOLOL HYDROCHLORIDE 20 MG: 20 TABLET ORAL at 08:00

## 2018-07-01 RX ADMIN — INSULIN LISPRO 6 UNITS: 100 INJECTION, SOLUTION INTRAVENOUS; SUBCUTANEOUS at 20:54

## 2018-07-01 RX ADMIN — POTASSIUM CHLORIDE 40 MEQ: 20 SOLUTION ORAL at 07:58

## 2018-07-01 RX ADMIN — SODIUM CHLORIDE TAB 1 GM 2 G: 1 TAB at 08:00

## 2018-07-01 RX ADMIN — OXYCODONE HYDROCHLORIDE 5 MG: 5 SOLUTION ORAL at 04:22

## 2018-07-01 RX ADMIN — RISPERIDONE 1 MG: 1 TABLET, FILM COATED ORAL at 08:00

## 2018-07-01 RX ADMIN — PROPRANOLOL HYDROCHLORIDE 20 MG: 20 TABLET ORAL at 14:58

## 2018-07-01 RX ADMIN — DOCUSATE SODIUM 100 MG: 50 LIQUID ORAL at 07:59

## 2018-07-01 RX ADMIN — CEFEPIME 2 G: 2 INJECTION, POWDER, FOR SOLUTION INTRAMUSCULAR; INTRAVENOUS at 16:02

## 2018-07-01 RX ADMIN — INSULIN LISPRO 6 UNITS: 100 INJECTION, SOLUTION INTRAVENOUS; SUBCUTANEOUS at 00:40

## 2018-07-01 RX ADMIN — OXYCODONE HYDROCHLORIDE 5 MG: 5 SOLUTION ORAL at 14:58

## 2018-07-01 RX ADMIN — DOCUSATE SODIUM 100 MG: 50 LIQUID ORAL at 21:00

## 2018-07-01 RX ADMIN — ENOXAPARIN SODIUM 30 MG: 30 INJECTION SUBCUTANEOUS at 08:30

## 2018-07-01 RX ADMIN — SODIUM CHLORIDE TAB 1 GM 2 G: 1 TAB at 14:58

## 2018-07-01 RX ADMIN — INSULIN LISPRO 6 UNITS: 100 INJECTION, SOLUTION INTRAVENOUS; SUBCUTANEOUS at 07:57

## 2018-07-01 RX ADMIN — CEFEPIME 2 G: 2 INJECTION, POWDER, FOR SOLUTION INTRAMUSCULAR; INTRAVENOUS at 23:40

## 2018-07-01 RX ADMIN — INSULIN GLARGINE 30 UNITS: 100 INJECTION, SOLUTION SUBCUTANEOUS at 20:56

## 2018-07-01 RX ADMIN — INSULIN LISPRO 3 UNITS: 100 INJECTION, SOLUTION INTRAVENOUS; SUBCUTANEOUS at 16:02

## 2018-07-01 RX ADMIN — INSULIN LISPRO 3 UNITS: 100 INJECTION, SOLUTION INTRAVENOUS; SUBCUTANEOUS at 23:38

## 2018-07-01 RX ADMIN — ENOXAPARIN SODIUM 30 MG: 30 INJECTION SUBCUTANEOUS at 21:00

## 2018-07-01 RX ADMIN — PANTOPRAZOLE SODIUM 40 MG: 40 GRANULE, DELAYED RELEASE ORAL at 05:57

## 2018-07-01 RX ADMIN — PROMETHAZINE HYDROCHLORIDE 6.25 MG: 25 INJECTION INTRAMUSCULAR; INTRAVENOUS at 08:01

## 2018-07-01 RX ADMIN — Medication 10 ML: at 21:03

## 2018-07-01 RX ADMIN — SODIUM CHLORIDE TAB 1 GM 2 G: 1 TAB at 21:01

## 2018-07-01 RX ADMIN — OXYCODONE HYDROCHLORIDE 5 MG: 5 SOLUTION ORAL at 08:22

## 2018-07-01 RX ADMIN — OXYCODONE HYDROCHLORIDE 5 MG: 5 SOLUTION ORAL at 19:19

## 2018-07-01 RX ADMIN — CEFEPIME 2 G: 2 INJECTION, POWDER, FOR SOLUTION INTRAMUSCULAR; INTRAVENOUS at 00:30

## 2018-07-01 ASSESSMENT — PAIN SCALES - GENERAL
PAINLEVEL_OUTOF10: 0
PAINLEVEL_OUTOF10: 6
PAINLEVEL_OUTOF10: 7
PAINLEVEL_OUTOF10: 0
PAINLEVEL_OUTOF10: 0
PAINLEVEL_OUTOF10: 2
PAINLEVEL_OUTOF10: 0
PAINLEVEL_OUTOF10: 8
PAINLEVEL_OUTOF10: 0
PAINLEVEL_OUTOF10: 4
PAINLEVEL_OUTOF10: 6
PAINLEVEL_OUTOF10: 8

## 2018-07-01 NOTE — PROGRESS NOTES
Physical Therapy  Facility/Department: [address] [facility]  Daily Treatment Note  NAME: Rigo Ward  : 1970  MRN: [id number]    Date of Service: 2018   Evaluating Therapist: Edy Ballesteros PT, DPT     ROOM #: [id number]  DIAGNOSIS: Trauma  PRECAUTIONS: Falls, 6L O2, NG tube, C-collar  - all imaging is negative, L ICH  PROCEDURES:  extubated     Social:  Pt lives with parents in a 1 floor plan with 2 step(s) and 1 rail(s) to enter. Prior to admission pt walked with no device and was Independent. Per pt's mother in chart, pt works odd jobs intermittently.       Initial Evaluation  Date: 18 Treatment  Date:   18  Short Term/ Long Term   Goals   AM-PAC 6 Clicks 25/50 60/52      Does pt have pain? No c/o pain No c/o pain      Bed Mobility  Rolling: SBA  Supine to sit: ModA  Sit to supine: MaxA  Scooting: ModA Mod A- unable to complete due to decreased safety SBA   Transfers Sit to stand: MaxA  Stand to sit: MaxA  Stand pivot: NT NT [place] with AAD   Ambulation   2 feet side stepping to [facility] with MaxA with no device NT >75 feet with [place] with AAD   Stair negotiation: ascended and descended NT  NT >4 steps with 1 rail with Al[place]   BLE ROM WNL  WNL     BLE strength Grossly 3+ to 4-/5 NT Increase by 1/3 MMT grade   Balance Sitting: ModA dynamic  Standing: MaxA no device NT Sitting: SBA  Standing: [place] with AAD      Alertness/Orientation: xself only   Endurance: fair   Sensation: unable to assess  Edema/Skin Integrity: no new skin integrity compromise noted     Comments: Pt cleared by nursing for treatment. Pt was supine upon PT arrival and agreeable to PT treatment. Initiated bed mobility but was terminated due to decreased safety. Pt began to kick at therapist and attempt to pull NGT out. Pt required Max A to restrain LUE to prevent dislodging of NGT. Restraint reapplied. Attempted to perform supine TE with pt, but pt increasingly agitated and session was terminated.   At end of session pt supine with call

## 2018-07-01 NOTE — FLOWSHEET NOTE
Patient continues to reach,grab for tubes/lines. Attempts to sit up to pull at lines. Unable to redirect. Soft restraints continued.

## 2018-07-01 NOTE — PROGRESS NOTES
Impression: Cerebral contusion with subarachnoid hemorrhage, left  frontotemporal lobe.  Blood in the left ear canal, possibly basal skull  fracture of temporal bone. There is no change in patient's neurosurgical status. Moves all extremities but not to commands. Repeat CT scan of brain Friday revealed:  1. Interval resolution of the left temporal and frontal subarachnoid   hemorrhage. 2. Right frontal low-attenuation anteroinferiorly most likely   representing recent trauma and cerebral confusion. 3. Age-related atrophic changes and chronic microvascular ischemic   disease. 4. Nondisplaced left mastoid/temporal bone fracture and opacification   of the left mastoid air cells. 5. Sinusitis.         Nothing new to add from neurosurgical stand point at this time.   Will continue to follow along.

## 2018-07-01 NOTE — PLAN OF CARE
Problem: Falls - Risk of:  Goal: Absence of physical injury  Absence of physical injury   Outcome: Met This Shift      Problem: Restraint Use - Nonviolent/Non-Self-Destructive Behavior:  Goal: Absence of restraint indications  Absence of restraint indications   Outcome: Not Met This Shift    Goal: Absence of restraint-related injury  Absence of restraint-related injury   Outcome: Met This Shift

## 2018-07-01 NOTE — FLOWSHEET NOTE
Pt agitated and restless in his bed, unable to be redirected and continues to pull at his bailey and corepak. Restraints remain applied for patient safety at this time.

## 2018-07-02 LAB
ANION GAP SERPL CALCULATED.3IONS-SCNC: 10 MMOL/L (ref 7–16)
BASOPHILS ABSOLUTE: 0.05 E9/L (ref 0–0.2)
BASOPHILS RELATIVE PERCENT: 0.5 % (ref 0–2)
BUN BLDV-MCNC: 35 MG/DL (ref 6–20)
CALCIUM SERPL-MCNC: 9.5 MG/DL (ref 8.6–10.2)
CHLORIDE BLD-SCNC: 96 MMOL/L (ref 98–107)
CO2: 37 MMOL/L (ref 22–29)
CREAT SERPL-MCNC: 0.9 MG/DL (ref 0.7–1.2)
EOSINOPHILS ABSOLUTE: 0.21 E9/L (ref 0.05–0.5)
EOSINOPHILS RELATIVE PERCENT: 2.2 % (ref 0–6)
GFR AFRICAN AMERICAN: >60
GFR NON-AFRICAN AMERICAN: >60 ML/MIN/1.73
GLUCOSE BLD-MCNC: 143 MG/DL (ref 74–109)
HCT VFR BLD CALC: 36.2 % (ref 37–54)
HEMOGLOBIN: 11.4 G/DL (ref 12.5–16.5)
IMMATURE GRANULOCYTES #: 0.07 E9/L
IMMATURE GRANULOCYTES %: 0.7 % (ref 0–5)
LYMPHOCYTES ABSOLUTE: 2.38 E9/L (ref 1.5–4)
LYMPHOCYTES RELATIVE PERCENT: 24.4 % (ref 20–42)
MCH RBC QN AUTO: 27.3 PG (ref 26–35)
MCHC RBC AUTO-ENTMCNC: 31.5 % (ref 32–34.5)
MCV RBC AUTO: 86.8 FL (ref 80–99.9)
METER GLUCOSE: 117 MG/DL (ref 70–110)
METER GLUCOSE: 134 MG/DL (ref 70–110)
METER GLUCOSE: 152 MG/DL (ref 70–110)
METER GLUCOSE: 175 MG/DL (ref 70–110)
METER GLUCOSE: 206 MG/DL (ref 70–110)
METER GLUCOSE: 214 MG/DL (ref 70–110)
METER GLUCOSE: 222 MG/DL (ref 70–110)
METER GLUCOSE: 52 MG/DL (ref 70–110)
METER GLUCOSE: 53 MG/DL (ref 70–110)
MONOCYTES ABSOLUTE: 1.46 E9/L (ref 0.1–0.95)
MONOCYTES RELATIVE PERCENT: 15 % (ref 2–12)
NEUTROPHILS ABSOLUTE: 5.57 E9/L (ref 1.8–7.3)
NEUTROPHILS RELATIVE PERCENT: 57.2 % (ref 43–80)
PDW BLD-RTO: 13.4 FL (ref 11.5–15)
PLATELET # BLD: 352 E9/L (ref 130–450)
PMV BLD AUTO: 9.9 FL (ref 7–12)
POTASSIUM SERPL-SCNC: 4.2 MMOL/L (ref 3.5–5)
RBC # BLD: 4.17 E12/L (ref 3.8–5.8)
SODIUM BLD-SCNC: 143 MMOL/L (ref 132–146)
WBC # BLD: 9.7 E9/L (ref 4.5–11.5)

## 2018-07-02 PROCEDURE — 99291 CRITICAL CARE FIRST HOUR: CPT | Performed by: SURGERY

## 2018-07-02 PROCEDURE — 80048 BASIC METABOLIC PNL TOTAL CA: CPT

## 2018-07-02 PROCEDURE — 36415 COLL VENOUS BLD VENIPUNCTURE: CPT

## 2018-07-02 PROCEDURE — 6360000002 HC RX W HCPCS: Performed by: STUDENT IN AN ORGANIZED HEALTH CARE EDUCATION/TRAINING PROGRAM

## 2018-07-02 PROCEDURE — 2580000003 HC RX 258: Performed by: STUDENT IN AN ORGANIZED HEALTH CARE EDUCATION/TRAINING PROGRAM

## 2018-07-02 PROCEDURE — 82962 GLUCOSE BLOOD TEST: CPT

## 2018-07-02 PROCEDURE — 6370000000 HC RX 637 (ALT 250 FOR IP): Performed by: STUDENT IN AN ORGANIZED HEALTH CARE EDUCATION/TRAINING PROGRAM

## 2018-07-02 PROCEDURE — 6360000002 HC RX W HCPCS

## 2018-07-02 PROCEDURE — 92610 EVALUATE SWALLOWING FUNCTION: CPT

## 2018-07-02 PROCEDURE — 6360000002 HC RX W HCPCS: Performed by: SURGERY

## 2018-07-02 PROCEDURE — 2000000000 HC ICU R&B

## 2018-07-02 PROCEDURE — 85025 COMPLETE CBC W/AUTO DIFF WBC: CPT

## 2018-07-02 PROCEDURE — 97530 THERAPEUTIC ACTIVITIES: CPT

## 2018-07-02 PROCEDURE — 2700000000 HC OXYGEN THERAPY PER DAY

## 2018-07-02 PROCEDURE — 6370000000 HC RX 637 (ALT 250 FOR IP): Performed by: SURGERY

## 2018-07-02 RX ORDER — DEXAMETHASONE SODIUM PHOSPHATE 4 MG/ML
4 INJECTION, SOLUTION INTRA-ARTICULAR; INTRALESIONAL; INTRAMUSCULAR; INTRAVENOUS; SOFT TISSUE EVERY 6 HOURS
Status: DISCONTINUED | OUTPATIENT
Start: 2018-07-02 | End: 2018-07-05

## 2018-07-02 RX ORDER — HALOPERIDOL 5 MG/ML
INJECTION INTRAMUSCULAR
Status: COMPLETED
Start: 2018-07-02 | End: 2018-07-02

## 2018-07-02 RX ORDER — HALOPERIDOL 5 MG/ML
5 INJECTION INTRAMUSCULAR ONCE
Status: COMPLETED | OUTPATIENT
Start: 2018-07-02 | End: 2018-07-02

## 2018-07-02 RX ADMIN — INSULIN LISPRO 3 UNITS: 100 INJECTION, SOLUTION INTRAVENOUS; SUBCUTANEOUS at 08:27

## 2018-07-02 RX ADMIN — RISPERIDONE 2 MG: 2 TABLET, FILM COATED ORAL at 20:38

## 2018-07-02 RX ADMIN — INSULIN LISPRO 6 UNITS: 100 INJECTION, SOLUTION INTRAVENOUS; SUBCUTANEOUS at 16:27

## 2018-07-02 RX ADMIN — INSULIN LISPRO 6 UNITS: 100 INJECTION, SOLUTION INTRAVENOUS; SUBCUTANEOUS at 20:28

## 2018-07-02 RX ADMIN — DEXAMETHASONE SODIUM PHOSPHATE 4 MG: 4 INJECTION, SOLUTION INTRAMUSCULAR; INTRAVENOUS at 21:52

## 2018-07-02 RX ADMIN — HALOPERIDOL 5 MG: 5 INJECTION INTRAMUSCULAR at 08:26

## 2018-07-02 RX ADMIN — Medication 10 ML: at 08:27

## 2018-07-02 RX ADMIN — DOCUSATE SODIUM 100 MG: 50 LIQUID ORAL at 20:38

## 2018-07-02 RX ADMIN — Medication 5 ML: at 20:38

## 2018-07-02 RX ADMIN — OXYCODONE HYDROCHLORIDE 5 MG: 5 SOLUTION ORAL at 10:15

## 2018-07-02 RX ADMIN — SODIUM CHLORIDE TAB 1 GM 2 G: 1 TAB at 14:27

## 2018-07-02 RX ADMIN — CEFEPIME 2 G: 2 INJECTION, POWDER, FOR SOLUTION INTRAMUSCULAR; INTRAVENOUS at 08:27

## 2018-07-02 RX ADMIN — OXYCODONE HYDROCHLORIDE 5 MG: 5 SOLUTION ORAL at 20:17

## 2018-07-02 RX ADMIN — INSULIN GLARGINE 30 UNITS: 100 INJECTION, SOLUTION SUBCUTANEOUS at 20:30

## 2018-07-02 RX ADMIN — INSULIN LISPRO 6 UNITS: 100 INJECTION, SOLUTION INTRAVENOUS; SUBCUTANEOUS at 23:39

## 2018-07-02 RX ADMIN — PANTOPRAZOLE SODIUM 40 MG: 40 GRANULE, DELAYED RELEASE ORAL at 07:03

## 2018-07-02 RX ADMIN — ENOXAPARIN SODIUM 30 MG: 30 INJECTION SUBCUTANEOUS at 20:36

## 2018-07-02 RX ADMIN — SODIUM CHLORIDE, PRESERVATIVE FREE 300 UNITS: 5 INJECTION INTRAVENOUS at 08:27

## 2018-07-02 RX ADMIN — POTASSIUM CHLORIDE 40 MEQ: 20 SOLUTION ORAL at 20:36

## 2018-07-02 RX ADMIN — POTASSIUM CHLORIDE 40 MEQ: 20 SOLUTION ORAL at 08:28

## 2018-07-02 RX ADMIN — Medication: at 08:26

## 2018-07-02 RX ADMIN — ENOXAPARIN SODIUM 30 MG: 30 INJECTION SUBCUTANEOUS at 08:28

## 2018-07-02 RX ADMIN — HALOPERIDOL LACTATE 5 MG: 5 INJECTION, SOLUTION INTRAMUSCULAR at 08:26

## 2018-07-02 RX ADMIN — CEFEPIME 2 G: 2 INJECTION, POWDER, FOR SOLUTION INTRAMUSCULAR; INTRAVENOUS at 23:43

## 2018-07-02 RX ADMIN — RISPERIDONE 2 MG: 2 TABLET, FILM COATED ORAL at 08:28

## 2018-07-02 RX ADMIN — Medication 10 ML: at 20:35

## 2018-07-02 RX ADMIN — SODIUM CHLORIDE TAB 1 GM 2 G: 1 TAB at 08:29

## 2018-07-02 RX ADMIN — ACETAZOLAMIDE 500 MG: 500 INJECTION, POWDER, LYOPHILIZED, FOR SOLUTION INTRAVENOUS at 08:37

## 2018-07-02 RX ADMIN — INSULIN GLARGINE 30 UNITS: 100 INJECTION, SOLUTION SUBCUTANEOUS at 08:27

## 2018-07-02 RX ADMIN — MINERAL OIL AND WHITE PETROLATUM: 150; 830 OINTMENT OPHTHALMIC at 17:32

## 2018-07-02 RX ADMIN — CEFEPIME 2 G: 2 INJECTION, POWDER, FOR SOLUTION INTRAMUSCULAR; INTRAVENOUS at 16:28

## 2018-07-02 RX ADMIN — PROPRANOLOL HYDROCHLORIDE 20 MG: 20 TABLET ORAL at 20:39

## 2018-07-02 RX ADMIN — OXYCODONE HYDROCHLORIDE 5 MG: 5 SOLUTION ORAL at 04:47

## 2018-07-02 RX ADMIN — SODIUM CHLORIDE TAB 1 GM 2 G: 1 TAB at 20:36

## 2018-07-02 RX ADMIN — DEXAMETHASONE SODIUM PHOSPHATE 4 MG: 4 INJECTION, SOLUTION INTRAMUSCULAR; INTRAVENOUS at 11:13

## 2018-07-02 RX ADMIN — PROMETHAZINE HYDROCHLORIDE 6.25 MG: 25 INJECTION INTRAMUSCULAR; INTRAVENOUS at 03:45

## 2018-07-02 RX ADMIN — DEXAMETHASONE SODIUM PHOSPHATE 4 MG: 4 INJECTION, SOLUTION INTRAMUSCULAR; INTRAVENOUS at 16:27

## 2018-07-02 RX ADMIN — ACETAMINOPHEN 650 MG: 650 SOLUTION ORAL at 08:28

## 2018-07-02 RX ADMIN — SODIUM CHLORIDE, PRESERVATIVE FREE 300 UNITS: 5 INJECTION INTRAVENOUS at 20:35

## 2018-07-02 RX ADMIN — DEXTROSE MONOHYDRATE 12.5 G: 25 INJECTION, SOLUTION INTRAVENOUS at 12:12

## 2018-07-02 RX ADMIN — PROPRANOLOL HYDROCHLORIDE 20 MG: 20 TABLET ORAL at 08:28

## 2018-07-02 RX ADMIN — SODIUM CHLORIDE TAB 1 GM 2 G: 1 TAB at 20:39

## 2018-07-02 RX ADMIN — DOCUSATE SODIUM 100 MG: 50 LIQUID ORAL at 08:28

## 2018-07-02 ASSESSMENT — PAIN DESCRIPTION - ONSET
ONSET: ON-GOING
ONSET: ON-GOING

## 2018-07-02 ASSESSMENT — PAIN SCALES - GENERAL
PAINLEVEL_OUTOF10: 4
PAINLEVEL_OUTOF10: 3
PAINLEVEL_OUTOF10: 4
PAINLEVEL_OUTOF10: 8
PAINLEVEL_OUTOF10: 7

## 2018-07-02 ASSESSMENT — PAIN DESCRIPTION - LOCATION
LOCATION: GENERALIZED
LOCATION: GENERALIZED

## 2018-07-02 ASSESSMENT — PAIN DESCRIPTION - FREQUENCY
FREQUENCY: CONTINUOUS
FREQUENCY: CONTINUOUS

## 2018-07-02 ASSESSMENT — PAIN DESCRIPTION - DESCRIPTORS
DESCRIPTORS: PATIENT UNABLE TO DESCRIBE
DESCRIPTORS: PATIENT UNABLE TO DESCRIBE

## 2018-07-02 ASSESSMENT — PAIN DESCRIPTION - PAIN TYPE
TYPE: ACUTE PAIN;CHRONIC PAIN
TYPE: ACUTE PAIN;CHRONIC PAIN

## 2018-07-02 ASSESSMENT — PAIN DESCRIPTION - PROGRESSION: CLINICAL_PROGRESSION: NOT CHANGED

## 2018-07-02 NOTE — FLOWSHEET NOTE
Patient continues to pull at lines and tubes with BUE and kicks legs up towards staff and OOB when unrestrained for turning, daily cares and ROM. 4pt soft restraints remain in place. Continue to monitor.

## 2018-07-02 NOTE — PLAN OF CARE
Problem: Nutrition  Goal: Optimal nutrition therapy  Outcome: Ongoing  Nutrition Problem: Inadequate oral intake  Intervention: Food and/or Nutrient Delivery: Continue current Tube Feeding  Nutritional Goals: TF to goal w/ good tolerance

## 2018-07-02 NOTE — PROGRESS NOTES
Daily Trauma Progress Note 7/2/2018      Admit Date: 6/16/2018      Mechanism of Injury: Fall SH, temporal bone Fx     INJURIES:   Patient Active Problem List   Diagnosis    Trauma    [facility]    Closed fracture of temporal bone ([facility]    Pneumonia of left lower lobe due to methicillin susceptible Staphylococcus aureus (MSSA) ([facility]    Fall from ground level    Acute blood loss anemia    Insulin dependent diabetes mellitus ([facility]    Acute respiratory failure following trauma and surgery [facility])     Pertinent Hospital Course Events:   6/16 admitted to SICU for [address], became extremely agitated throughout the day, intubated for airway protection, RIJ CL placed, thiamine, folate started  6/17, febrile, tolerated PS trial  6/18 remains febrile, pancx  6/19 resp cx + staph aureus, vancomycin started  6/20 MSSA, vanc changed to ancef  6/21 propofol dc'd, precedex, ativan added, increased lantus  6/24 following commands, no cuff leak, decadron 40 mg given  6/25 no cuff leak, on scheduled decadron 10 mg   6/26 extubated, swallow study not performed due to cough, hoarseness  6/27 corepak placed  6/29 d/c Vanco, likely not MRSA, previous MSSA, NT suction specimen       PREVIOUS 24 HOUR EVENTS: no acute events, per nursing patient has been more lucid today, but remains impulsive with exaggeration of all commands    Subjective:     Patient states he feels better today. Objective:   Patient Vitals for the past 8 hrs:   BP Temp Temp src Pulse Resp SpO2   07/02/18 0400 123/87 98.9 °F (37.2 °C) - 109 16 97 %   07/02/18 0300 124/81 - - 115 17 99 %   07/02/18 0200 103/74 98.8 °F (37.1 °C) Axillary 102 17 98 %   07/02/18 0100 109/72 - - 97 28 98 %   07/02/18 0000 111/77 99 °F (37.2 °C) Axillary 103 17 98 %   07/01/18 2300 101/74 - - 113 15 98 %   07/01/18 2200 116/73 98.8 °F (37.1 °C) Axillary 96 18 97 %     I/O last 3 completed shifts:   In: 2644 [I.V.:125; RZ/ZK:9444; IV Piggyback:50]  Out: 3823 [Urine:2267]  I/O this shift: In: 48 [IV Piggyback:50]  Out: 382 [Urine:382]    PHYSICAL:   Pain Description: mild  GCS:  14  4 - Opens eyes on own   6 - Follows simple motor commands  4 - Seems confused, disoriented    Pupil size:  Left 4 mm    Right 4 mm    Pupil reaction: Yes    Thumb sign: Left Yes Right Yes    Hand grasp:   Left normal       Right normal    Wiggles toes: Left Yes    Right Yes    Plantar flexion: Left normal     Right normal    GENERAL:  Laying in bed, awake, alert, cooperative though exaggerates all responses, no apparent distress with Corpak in place. Pt restrained  HEAD: Normocephalic, atraumatic   EYES: No sclera icterus, pupils equal  LUNGS:  No increased work of breathing  CARDIOVASCULAR:  Sinus Tachycardia otherwise no murmurs or abnormal rhythm  ABDOMEN:  Soft, non-tender, non-distended  EXTREMITIES: Atraumatic  SKIN: Warm and dry    CONSULTS:   Trauma  Neurosurgery    Assessment:     Principal Problem:    Trauma  Active Problems:    Subarachnoid hemorrhage (HCC)    Closed fracture of temporal bone (HCC)    Pneumonia of left lower lobe due to methicillin susceptible Staphylococcus aureus (MSSA) ([facility]    Fall from ground level    Acute blood loss anemia    Insulin dependent diabetes mellitus (HCC)    Acute respiratory failure following trauma and surgery ([facility] [facility]  Resolved Problems:    * No resolved hospital problems.  *      Plan:     · Neuro:  · SAH/TBI   · Following commands  · Neuro status improving today  · Increasing agitation  · One dose Haldol  · Remove C-collar  · Remove Cifuentes  · Head CT improving  · Risperadal 2mg BID for delirium/TBI    · CV:   · Sinus Tachycardia   · Likely related to brain storming  · Continue Propranolol  · Pulm:   · Acute Respiratory Insufficiency  · Strong cough  · Tracheitis  · On Cefepime  · Afebrile  · Renal:   · Hyponatremia  · Resolved  · FEN  · Overall fluid balance even  · GI:   · Calorie Protein Malnutrition  · CorPak in place  · Will need swallow study once felt safe for oral meal intake  · Attempt today  · Heme:   · Hgb 12.1 --> 11.9  · ID:   · Tracheitis  · Cefepime  · MSK: Moving all extremities    Bowel regimen: Colace, senna  Pain control: Roxicodone  DVT prophylaxis: SCDs, Lovenox  GI: Protonix  Glucose protocol: Monitor glucose, On Humalog (High Dose), Lantus 30U BID  Mouth/Eye care: Per patient  Cifuentes: Keep in place for critical care monitoring of fluid balance  CVC: None  Family update: As available  Code Status: Full    Disposition:  Continue to monitor    Opal Turner DO  7/2/2018  5:23 AM

## 2018-07-02 NOTE — PROGRESS NOTES
agitation with NGT and NPO status, asking for a drink despite education and requiring physical redirection from pulling NGT. Pt was able to take several side steps, knees appear to buckle however pt begins dancing, requiring maximal verbal cues to attend to task and for safety, behavior repeated throughout mobility. Pt was able to sit EOB and reach for objects with assistance to maintain dynamic sitting balance. Pt continues to reach for NGT. Pt was assisted to return to bed and placed in restraints resting comfortably. Comments: Upon arrival pt supine in bed. At end of session supine in bed all lines and tubes intact, call light within reach. Nurse present, placed in restraints    · Pt has made fair progress towards set goals.    · Continue with current plan of care    Time in: 2:25  Time out:2:48  Total Tx Time: 23 minutes    Mary Archibald OTR/L

## 2018-07-02 NOTE — PLAN OF CARE
Problem: Falls - Risk of:  Goal: Will remain free from falls  Will remain free from falls   Outcome: Met This Shift      Problem: Risk for Impaired Skin Integrity  Goal: Tissue integrity - skin and mucous membranes  Structural intactness and normal physiological function of skin and  mucous membranes.    Outcome: Met This Shift      Problem: Restraint Use - Nonviolent/Non-Self-Destructive Behavior:  Goal: Absence of restraint indications  Absence of restraint indications   Outcome: Not Met This Shift    Goal: Absence of restraint-related injury  Absence of restraint-related injury   Outcome: Met This Shift      Problem: Confusion - Acute:  Goal: Mental status will be restored to baseline  Mental status will be restored to baseline   Outcome: Ongoing

## 2018-07-02 NOTE — PROGRESS NOTES
Physical Therapy  Facility/Department: [address]  Daily Treatment Note  NAME: Ros Doss  : 1970  MRN: [id number]    Date of Service: 2018   Evaluating Therapist: Francois Sanchez PT, DPT     ROOM #: [id number]  DIAGNOSIS: Trauma  PRECAUTIONS: Falls, 6L O2, NG tube, C-collar  - all imaging is negative, L ICH  PROCEDURES:  extubated     Social:  Pt lives with parents in a 1 floor plan with 2 step(s) and 1 rail(s) to enter. Prior to admission pt walked with no device and was Independent.  Per pt's mother in chart, pt works odd jobs intermittently.       Initial Evaluation  Date: 18 Treatment  Date:   18  Short Term/ Long Term   Goals   AM-PAC 6 Clicks 84/05 36/      Does pt have pain? No c/o pain No c/o pain      Bed Mobility  Rolling: SBA  Supine to sit: ModA  Sit to supine: MaxA  Scooting: ModA Rolling: NT  Supine to sit: Min A  Sit to supine: Min a  Scooting: Min a to EOB SBA   Transfers Sit to stand: MaxA  Stand to sit: MaxA  Stand pivot: NT Sit to stand: Min A x 2  Stand to sit: Min A x 2  Stand pivot: NT  Poor safety Alona with AAD   Ambulation   2 feet side stepping to [facility] with MaxA with no device Side stepping at EOB approx 10 feet with Min a x 2 HHA  >75 feet with Alona with AAD   Stair negotiation: ascended and descended NT  NT >4 steps with 1 rail with Alona   BLE ROM WNL  WNL     BLE strength Grossly 3+ to 4-/5 NT Increase by 1/3 MMT grade   Balance Sitting: ModA dynamic  Standing: MaxA no device NT Sitting: SBA  Standing: Alona with AAD        Pt performed therapeutic exercise of the following: Side stepping x 10 feet and mini squats x 5 reps. Patient education  Pt was educated on safety     Patient response to education:   Pt verbalized understanding Pt demonstrated skill Pt requires further education in this area   x x x     Additional Comments: Pt very impulsive and unsafe throughout treatment.  Pt perseverating on subjects for instance after asked his name her would perseverate, when asked place her perseverated. Continuously attempted to pull at NG tube. Once in standing pt appeared to attempt to sit, however began dancing. Pt repeatedly doing this even with max cues. Pt asked to count with therapist when side stepping and was distracted for 2-3 steps then began dancing again. Pt strength and balance fair, however limited by cognitive deficits at this time. Pt was left supine with call light left by patient. Restraints donned. Time in: 1425  Time out: 1448    Pt is making fair progress toward established Physical Therapy goals. Continue with physical therapy current plan of care.     [facility]  License Number: [id number]

## 2018-07-02 NOTE — PROGRESS NOTES
Nutrition Assessment    Type and Reason for Visit: Reassess    Nutrition Recommendations: Current TF will meet 100% of patient's calculated needs. Will continue to monitor patient. No tolerance issues noted. No new recommendations at this time. Will continue to monitor patient. Malnutrition Assessment:  · Malnutrition Status: At risk for malnutrition  · Context: Acute illness or injury  · Findings of the 6 clinical characteristics of malnutrition (Minimum of 2 out of 6 clinical characteristics is required to make the diagnosis of moderate or severe Protein Calorie Malnutrition based on AND/ASPEN Guidelines):  1. Energy Intake-Less than or equal to 75%, greater than or equal to 5 days    2. Weight Loss-Unable to assess, unable to assess  3. Fat Loss-No significant subcutaneous fat loss,    4. Muscle Loss-No significant muscle mass loss,    5. Fluid Accumulation-No significant fluid accumulation,    6.   Strength-Not measured    Nutrition Diagnosis:   · Problem: Inadequate oral intake  · Etiology: related to Difficulty swallowing     Signs and symptoms:  as evidenced by Nutrition support - EN, Swallow study results    Nutrition Assessment:  · Nutrition-Focused Physical Findings: s/p extubation, failed swallow 6/26, corpak present, +I/Os, oriented x1, soft abd, active bs, trace BUE edema noted, no tolerance issues noted to TF per nursing   · Wound Type: None (noted abrasions)  · Current Nutrition Therapies:  · Oral Diet Orders: NPO   · Oral Diet intake: NPO  · Tube Feeding (TF) Orders:   · Feeding Route: Nasogastric  · Formula: Diabetic  · Rate (ml/hr):75ml/hr     · Volume (ml/day): 1800 ml   · Duration: Continuous 24hrs  · Additives/Modulars: Protein (daily )  · TF Residuals: Less than or equal to 250ml  · Water Flushes: Per Nursing Protocol (30 cc q6h )  · Current TF & Flush Orders Provides: 2160 kcals, 108 gm protein, 1449 ml total wter (w/ pro mod, 2260 kcals, 134 gm protein)   · Goal TF & Flush Orders Provides: 2160kcals, 108gm pro, 1449ml water, 2949ml total water (with pro mod daily: 2260kcals, 134gm pro)  · Additional Calories: prop d/c   · Anthropometric Measures:  · Ht: 6' 2\" (188 cm)   · Current Body Wt: 159 lb (72.1 kg) (6/30 actual bedscale; UTO updated wt 2/2 bedscale read error )  · Admission Body Wt: 185 lb (83.9 kg) (6/16 Bed Scale)  · Usual Body Wt:  (UTO)  · % Weight Change: Noted 14% wt loss since admit, wt change may be 2/2 fluid or bedscale discrepancies vs true wt losses ,     · Ideal Body Wt: 190 lb (86.2 kg), % Ideal Body 94%  · BMI Classification: BMI 18.5 - 24.9 Normal Weight  · Comparative Standards (Estimated Nutrition Needs):  · Estimated Daily Total Kcal: 9262-7926  · Estimated Daily Protein (g): 100-115    Estimated Intake vs Estimated Needs: Intake Meets Needs (w/ currrent TF )    Nutrition Risk Level: Moderate    Nutrition Interventions:   Continue current Tube Feeding  Continued Inpatient Monitoring, Coordination of Care, No recommendations at this time (discussed pt status w/ RN )    Nutrition Evaluation:   · Evaluation: Goals set   · Goals: TF to goal w/ good tolerance    · Monitoring: NPO Status, TF Intake, TF Tolerance, Gastric Residuals, Skin Integrity, Fluid Balance, Ascites/Edema, Wound Healing, Mental Status/Confusion, Weight, Comparative Standards, Pertinent Labs, Chewing/Swallowing    See Adult Nutrition Doc Flowsheet for more detail.      Electronically signed by Tasha Mitchell RD, LD on 7/2/18 at 11:26 AM    Contact Number: [phone]

## 2018-07-02 NOTE — PROGRESS NOTES
SPEECH/LANGUAGE PATHOLOGY  BEDSIDE SWALLOWING EVALUATION    PATIENT NAME:  Lester Gonzalez      :  1970      TODAY'S DATE:  2018    SUMMARY OF EVALUATION     DYSPHAGIA DIAGNOSIS:  Marked oropharyngeal dysphagia due in part to cognitive deficits       DIET RECOMMENDATIONS: NPO (nothing by mouth including oral meds)      FEEDING RECOMMENDATIONS:    []No assistance required   []Stand by assist    []Full assistance required  []Set up required    []Supervision with all PO intake     []Double swallow    []Chin tuck   []Multiple swallow     []Small bites/sips      []Alternate solids / liquids  []Check for oral pocketing   []No straw    []Throat clear       []Spoon sip liquids   []Effortful swallow   []   [] Malnutrition indicators have been identified and nursing has been notified to ensure a dietary consult is ordered.       THERAPY RECOMMENDATIONS:       []  Therapy is not recommended       []  Therapy is recommended to:     []  Improve oral motor strength and range of motion     []  Improve tongue base retraction      []  Improve laryngeal strength and range of motion     []  Mealtime assessment of patient's tolerance of prescribed diet     []  Repeat bedside swallow study     [x]  Video Swallowing Evaluation is recommended when cognitive status has improved and requires a Physician order                PROCEDURE     Consistencies Administered During the Evaluation   Liquids: [x]  Thin    []  Nectar   []  Honey   Solids:  [x]  Pureed/Pudding   []  Soft Solid   []  Cookie   Other:      Method of Intake:   []  Cup  [x]  Spoon  [x]  Straw  []  Self Fed  [x]  Fed by Clinician     Position:   [x]  Upright seated ([x]In bed [] in chair)   []  Reclined in bed                   RESULTS     Oral Stage:   []  Normal   []  Functional  [x]  Abnormal     [x]  Dentition: ([x]  natural  []  missing teeth  []  edentulous  []  partials  [] dentures )    []  Inadequate labial seal resulting anterior labial spillage from ([] right  [] left  [] midline )    []  Decreased mastication due to ([]  poor/missing dentition  []  decreased lingual control  []  vertical munch  []  cognitive function)     [x]  Delayed A-P transit due to ([]  decreased initiation   [x] reduced lingual strength   [x]  cognitive function )    []  Oral residuals []  right buccal cavity  []  left buccal cavity []  sub lingually   []  on tongue base   []  throughout oral cavity     []  on superior tongue       []  on palate               []  on velum              []  anterior sulcus    Comments:      Pharyngeal Stage:  []  Normal   []  Functional      []  Abnormal     []  No signs of aspiration were noted during this evaluation however, silent aspiration cannot be ruled out at bedside. If silent aspiration is suspected, a Videofluoroscopic Study of Swallowing (MBS) is recommended and requires a physician order.    []  Throat clearing present after presentation of ([]  thin  []  nectar  []  honey  []  pureed  []  solid)    [x]  Immediate wet cough was noted after presentation of ([x]  thin  []  nectar  []  honey  [x]  pureed  []  Solid)    [x]  Latent wet cough was noted after presentation of ([x]  thin  []  nectar  []  honey  [x]  pureed  []  solid)    []  Wet respirations were noted after presentation of ([]  thin  []  nectar  []  honey  []  pureed  []  solid)    []  Wet/gurgly vocal quality was noted after presentation of ([]  thin  []  nectar  []  honey  []  pureed  []  Solid)    [] Multiple swallows were noted after presentation of ([]  thin  []  nectar  []  honey  []  pureed  [] solid)    []  Consistent O2  desaturation after the swallow    []  Eye watering/flushing of skin    []  Congested cough throughout evaluation    [] Delayed initiation of the pharyngeal swallow noted    []  Absent swallow                    []  Prognosis for improvements is   []  This plan will be re-evaluated and revised in 1 week  if warranted.    []  Patient stated goals:   []  Treatment goals discussed with []  patient/  []  family. []  The []  patient/ []  family understand the diagnosis, prognosis and plan of care. [x]The admitting diagnosis and active problem list, as listed below have been reviewed prior to initiation of this evaluation.      ADMITTING DIAGNOSIS: Trauma [T14.90XA]     ACTIVE PROBLEM LIST:   Patient Active Problem List   Diagnosis    Trauma    [address]oid hemorrhage ([address]    Closed fracture of temporal bone ([address]    Pneumonia of left lower lobe due to methicillin susceptible Staphylococcus aureus (MSSA) ([address]    Fall from ground level    Acute blood loss anemia    Insulin dependent diabetes mellitus ([address]    Acute respiratory failure following trauma and surgery ([address]       Katlyn Borges Speech Therapy Student   7/2/2018

## 2018-07-03 LAB
ANION GAP SERPL CALCULATED.3IONS-SCNC: 10 MMOL/L (ref 7–16)
BASOPHILS ABSOLUTE: 0.02 E9/L (ref 0–0.2)
BASOPHILS RELATIVE PERCENT: 0.3 % (ref 0–2)
BLOOD CULTURE, ROUTINE: NORMAL
BUN BLDV-MCNC: 39 MG/DL (ref 6–20)
CALCIUM SERPL-MCNC: 9.4 MG/DL (ref 8.6–10.2)
CHLORIDE BLD-SCNC: 100 MMOL/L (ref 98–107)
CO2: 31 MMOL/L (ref 22–29)
CREAT SERPL-MCNC: 1.1 MG/DL (ref 0.7–1.2)
CULTURE, BLOOD 2: NORMAL
EOSINOPHILS ABSOLUTE: 0 E9/L (ref 0.05–0.5)
EOSINOPHILS RELATIVE PERCENT: 0 % (ref 0–6)
GFR AFRICAN AMERICAN: >60
GFR NON-AFRICAN AMERICAN: >60 ML/MIN/1.73
GLUCOSE BLD-MCNC: 256 MG/DL (ref 74–109)
HCT VFR BLD CALC: 35.8 % (ref 37–54)
HEMOGLOBIN: 11.2 G/DL (ref 12.5–16.5)
IMMATURE GRANULOCYTES #: 0.06 E9/L
IMMATURE GRANULOCYTES %: 0.8 % (ref 0–5)
LYMPHOCYTES ABSOLUTE: 1.18 E9/L (ref 1.5–4)
LYMPHOCYTES RELATIVE PERCENT: 16.1 % (ref 20–42)
MCH RBC QN AUTO: 27 PG (ref 26–35)
MCHC RBC AUTO-ENTMCNC: 31.3 % (ref 32–34.5)
MCV RBC AUTO: 86.3 FL (ref 80–99.9)
METER GLUCOSE: 167 MG/DL (ref 70–110)
METER GLUCOSE: 180 MG/DL (ref 70–110)
METER GLUCOSE: 202 MG/DL (ref 70–110)
METER GLUCOSE: 208 MG/DL (ref 70–110)
METER GLUCOSE: 256 MG/DL (ref 70–110)
METER GLUCOSE: 280 MG/DL (ref 70–110)
METER GLUCOSE: >500 MG/DL (ref 70–110)
MONOCYTES ABSOLUTE: 0.76 E9/L (ref 0.1–0.95)
MONOCYTES RELATIVE PERCENT: 10.4 % (ref 2–12)
NEUTROPHILS ABSOLUTE: 5.3 E9/L (ref 1.8–7.3)
NEUTROPHILS RELATIVE PERCENT: 72.4 % (ref 43–80)
PDW BLD-RTO: 13.3 FL (ref 11.5–15)
PLATELET # BLD: 332 E9/L (ref 130–450)
PMV BLD AUTO: 9.6 FL (ref 7–12)
POTASSIUM SERPL-SCNC: 4.2 MMOL/L (ref 3.5–5)
RBC # BLD: 4.15 E12/L (ref 3.8–5.8)
SODIUM BLD-SCNC: 141 MMOL/L (ref 132–146)
WBC # BLD: 7.3 E9/L (ref 4.5–11.5)

## 2018-07-03 PROCEDURE — 51702 INSERT TEMP BLADDER CATH: CPT

## 2018-07-03 PROCEDURE — 6370000000 HC RX 637 (ALT 250 FOR IP): Performed by: STUDENT IN AN ORGANIZED HEALTH CARE EDUCATION/TRAINING PROGRAM

## 2018-07-03 PROCEDURE — 51798 US URINE CAPACITY MEASURE: CPT

## 2018-07-03 PROCEDURE — 97530 THERAPEUTIC ACTIVITIES: CPT

## 2018-07-03 PROCEDURE — 2580000003 HC RX 258: Performed by: STUDENT IN AN ORGANIZED HEALTH CARE EDUCATION/TRAINING PROGRAM

## 2018-07-03 PROCEDURE — 6370000000 HC RX 637 (ALT 250 FOR IP): Performed by: SURGERY

## 2018-07-03 PROCEDURE — 6360000002 HC RX W HCPCS: Performed by: STUDENT IN AN ORGANIZED HEALTH CARE EDUCATION/TRAINING PROGRAM

## 2018-07-03 PROCEDURE — 82962 GLUCOSE BLOOD TEST: CPT

## 2018-07-03 PROCEDURE — 2000000000 HC ICU R&B

## 2018-07-03 PROCEDURE — 6370000000 HC RX 637 (ALT 250 FOR IP): Performed by: NURSE PRACTITIONER

## 2018-07-03 PROCEDURE — 6360000002 HC RX W HCPCS: Performed by: SURGERY

## 2018-07-03 PROCEDURE — 80048 BASIC METABOLIC PNL TOTAL CA: CPT

## 2018-07-03 PROCEDURE — 36592 COLLECT BLOOD FROM PICC: CPT

## 2018-07-03 PROCEDURE — 85025 COMPLETE CBC W/AUTO DIFF WBC: CPT

## 2018-07-03 PROCEDURE — 99232 SBSQ HOSP IP/OBS MODERATE 35: CPT | Performed by: NURSE PRACTITIONER

## 2018-07-03 RX ORDER — INSULIN GLARGINE 100 [IU]/ML
35 INJECTION, SOLUTION SUBCUTANEOUS 2 TIMES DAILY
Status: DISCONTINUED | OUTPATIENT
Start: 2018-07-03 | End: 2018-07-04

## 2018-07-03 RX ADMIN — POTASSIUM CHLORIDE 40 MEQ: 20 SOLUTION ORAL at 20:39

## 2018-07-03 RX ADMIN — RISPERIDONE 2 MG: 2 TABLET, FILM COATED ORAL at 08:48

## 2018-07-03 RX ADMIN — INSULIN LISPRO 3 UNITS: 100 INJECTION, SOLUTION INTRAVENOUS; SUBCUTANEOUS at 16:36

## 2018-07-03 RX ADMIN — INSULIN LISPRO 9 UNITS: 100 INJECTION, SOLUTION INTRAVENOUS; SUBCUTANEOUS at 08:51

## 2018-07-03 RX ADMIN — INSULIN LISPRO 6 UNITS: 100 INJECTION, SOLUTION INTRAVENOUS; SUBCUTANEOUS at 23:55

## 2018-07-03 RX ADMIN — Medication 10 ML: at 22:11

## 2018-07-03 RX ADMIN — Medication 10 ML: at 20:40

## 2018-07-03 RX ADMIN — INSULIN LISPRO 9 UNITS: 100 INJECTION, SOLUTION INTRAVENOUS; SUBCUTANEOUS at 04:20

## 2018-07-03 RX ADMIN — PROPRANOLOL HYDROCHLORIDE 20 MG: 20 TABLET ORAL at 20:39

## 2018-07-03 RX ADMIN — SODIUM CHLORIDE, PRESERVATIVE FREE 300 UNITS: 5 INJECTION INTRAVENOUS at 08:49

## 2018-07-03 RX ADMIN — DOCUSATE SODIUM 100 MG: 50 LIQUID ORAL at 20:39

## 2018-07-03 RX ADMIN — Medication 10 ML: at 20:30

## 2018-07-03 RX ADMIN — SODIUM CHLORIDE TAB 1 GM 2 G: 1 TAB at 20:39

## 2018-07-03 RX ADMIN — Medication 10 ML: at 08:49

## 2018-07-03 RX ADMIN — DEXAMETHASONE SODIUM PHOSPHATE 4 MG: 4 INJECTION, SOLUTION INTRAMUSCULAR; INTRAVENOUS at 22:11

## 2018-07-03 RX ADMIN — SODIUM CHLORIDE, PRESERVATIVE FREE 300 UNITS: 5 INJECTION INTRAVENOUS at 20:40

## 2018-07-03 RX ADMIN — DEXAMETHASONE SODIUM PHOSPHATE 4 MG: 4 INJECTION, SOLUTION INTRAMUSCULAR; INTRAVENOUS at 09:30

## 2018-07-03 RX ADMIN — RISPERIDONE 2 MG: 2 TABLET, FILM COATED ORAL at 20:39

## 2018-07-03 RX ADMIN — PROPRANOLOL HYDROCHLORIDE 20 MG: 20 TABLET ORAL at 08:48

## 2018-07-03 RX ADMIN — POTASSIUM CHLORIDE 40 MEQ: 20 SOLUTION ORAL at 08:49

## 2018-07-03 RX ADMIN — DEXAMETHASONE SODIUM PHOSPHATE 4 MG: 4 INJECTION, SOLUTION INTRAMUSCULAR; INTRAVENOUS at 04:21

## 2018-07-03 RX ADMIN — DOCUSATE SODIUM 100 MG: 50 LIQUID ORAL at 08:49

## 2018-07-03 RX ADMIN — CEFEPIME 2 G: 2 INJECTION, POWDER, FOR SOLUTION INTRAMUSCULAR; INTRAVENOUS at 23:53

## 2018-07-03 RX ADMIN — SODIUM CHLORIDE TAB 1 GM 2 G: 1 TAB at 08:48

## 2018-07-03 RX ADMIN — SODIUM CHLORIDE TAB 1 GM 2 G: 1 TAB at 14:13

## 2018-07-03 RX ADMIN — DEXAMETHASONE SODIUM PHOSPHATE 4 MG: 4 INJECTION, SOLUTION INTRAMUSCULAR; INTRAVENOUS at 16:29

## 2018-07-03 RX ADMIN — INSULIN LISPRO 3 UNITS: 100 INJECTION, SOLUTION INTRAVENOUS; SUBCUTANEOUS at 20:00

## 2018-07-03 RX ADMIN — SODIUM CHLORIDE, PRESERVATIVE FREE 300 UNITS: 5 INJECTION INTRAVENOUS at 22:11

## 2018-07-03 RX ADMIN — Medication 5 ML: at 20:40

## 2018-07-03 RX ADMIN — CEFEPIME 2 G: 2 INJECTION, POWDER, FOR SOLUTION INTRAMUSCULAR; INTRAVENOUS at 08:30

## 2018-07-03 RX ADMIN — ENOXAPARIN SODIUM 30 MG: 30 INJECTION SUBCUTANEOUS at 08:49

## 2018-07-03 RX ADMIN — INSULIN GLARGINE 35 UNITS: 100 INJECTION, SOLUTION SUBCUTANEOUS at 20:00

## 2018-07-03 RX ADMIN — OXYCODONE HYDROCHLORIDE 5 MG: 5 SOLUTION ORAL at 19:48

## 2018-07-03 RX ADMIN — PROPRANOLOL HYDROCHLORIDE 20 MG: 20 TABLET ORAL at 14:13

## 2018-07-03 RX ADMIN — INSULIN GLARGINE 30 UNITS: 100 INJECTION, SOLUTION SUBCUTANEOUS at 08:49

## 2018-07-03 RX ADMIN — CEFEPIME 2 G: 2 INJECTION, POWDER, FOR SOLUTION INTRAMUSCULAR; INTRAVENOUS at 16:30

## 2018-07-03 RX ADMIN — ENOXAPARIN SODIUM 30 MG: 30 INJECTION SUBCUTANEOUS at 20:40

## 2018-07-03 RX ADMIN — INSULIN LISPRO 6 UNITS: 100 INJECTION, SOLUTION INTRAVENOUS; SUBCUTANEOUS at 11:59

## 2018-07-03 RX ADMIN — PANTOPRAZOLE SODIUM 40 MG: 40 GRANULE, DELAYED RELEASE ORAL at 05:32

## 2018-07-03 ASSESSMENT — PAIN SCALES - GENERAL
PAINLEVEL_OUTOF10: 0
PAINLEVEL_OUTOF10: 2
PAINLEVEL_OUTOF10: 0

## 2018-07-03 NOTE — PLAN OF CARE
Problem: Falls - Risk of:  Goal: Will remain free from falls  Will remain free from falls   Outcome: Met This Shift    Goal: Absence of physical injury  Absence of physical injury   Outcome: Met This Shift      Problem: Risk for Impaired Skin Integrity  Goal: Tissue integrity - skin and mucous membranes  Structural intactness and normal physiological function of skin and  mucous membranes. Outcome: Met This Shift      Problem: Restraint Use - Nonviolent/Non-Self-Destructive Behavior:  Goal: Absence of restraint indications  Absence of restraint indications   Outcome: Not Met This Shift    Goal: Absence of restraint-related injury  Absence of restraint-related injury   Outcome: Met This Shift  Plan of care discussed with patient/family. Patient/family incorporated into plan of care.

## 2018-07-03 NOTE — PROGRESS NOTES
[address] [facility]  Surgical Intensive Care Unit  Daily Progress Note        MECHANISM OF INJURY:  GLF    INJURIES:  Patient Active Problem List   Diagnosis    Trauma    [facility] ([facility]    Closed fracture of temporal bone ([facility]    Pneumonia of left lower lobe due to methicillin susceptible Staphylococcus aureus (MSSA) ([facility] [address] [facility]    Fall from ground level    Acute blood loss anemia    Insulin dependent diabetes mellitus (HCC)    Acute respiratory failure following trauma and surgery (HCC)       OVERNIGHT EVENTS:    agitation      PHYSICAL EXAM:  CONSTITUTIONAL:  Awake, confused, uncooperative at times  EYES:  Lids and lashes normal, pupils equal, round and reactive to light, extra ocular muscles intact, sclera clear, conjunctiva normal  LUNGS:  CTAB, course upper airway, hoarse  CARDIOVASCULAR:  RRR  ABDOMEN:  Soft, NT, ND  MUSCULOSKELETAL:  DOUGLAS x 4  NEUROLOGIC:  GCS 14, delirious      PROBLEMS/PLANS:    NEUROLOGIC:  PROBLEMS:  1. delirium  2. TBI  PLAN:  1. PNB if needed for agitation  2. Eliminate offending agents--bailey removed    SEDATION/ANALGESIA:  NSAID -  continue    CARDIOVASCULAR:  PROBLEMS:  1. Hypertension  PLAN:  1. antihypertensive -  continue    PULMONARY:  PROBLEMS:  1. Tracheitis  2. Hoarse throat  PLAN:  1. hyperinflation therapy -  continue  2. bronchopulmonary hygiene -  continue  3. bronchodilators -  continue  4. Decadron  5. cefipime    RENAL/FLUID/ELECTROLYTE:  PROBLEMS:  1. hypercarbia   PLAN:  1. bailey catheter -  Remove  2. diamox    GI/NUTRITION:  PROBLEMS:  1. Malnutrition  2. dysphagia  PLAN:  1. enteral nutrition -  continue    ID:  PROBLEMS:  1. tracheitis  PLAN:  1. antibiotic therapy -  cefepime -  continue    HEMATOLOGIC:  PROBLEMS:  1. No active issues   PLAN:  1. No active issues     ENDOCRINE:  PROBLEMS:  1. No active issues   PLAN:  1.  No active issues     PROPHYLAXIS:   Stress ulcer: PPI   VTE: SCDs, lovenox      DISPOSITION:   Continue ICU    CC TIME:  I spent 35 min managing this patients critical issues which included delirium excluding time teaching and performing procedures.         Christiano Warren MD  7/2/2018  8:30 PM

## 2018-07-03 NOTE — PROGRESS NOTES
Neuro Science Intensive Care Unit  Critical Care  Daily Progress Note 7/3/2018    Date of Admission: 06/15/2018    CC: Follow up traumatic brain injury  Principal Problem:    Trauma  Active Problems:    Subarachnoid hemorrhage (HCC)    Closed fracture of temporal bone (HCC)    Pneumonia of left lower lobe due to methicillin susceptible Staphylococcus aureus (MSSA) ([facility]    Fall from ground level    Acute blood loss anemia    Insulin dependent diabetes mellitus ([facility]    Acute respiratory failure following trauma and surgery ([facility] [facility]  Resolved Problems:    * No resolved hospital problems. *    Hospital events:    6/16 admitted to SICU for [facility], became extremely agitated throughout the day, intubated for airway protection, RIJ CL placed, thiamine, folate started  6/17, febrile, tolerated PS trial  6/18 remains febrile, pancx  6/19 resp cx + staph aureus, vancomycin started  6/20 MSSA, vanc changed to ancef  6/21 propofol dc'd, precedex, ativan added, increased lantus  6/24 following commands, no cuff leak, decadron 40 mg given  6/25 no cuff leak, on scheduled decadron 10 mg   6/26 extubated, swallow study not performed due to cough, hoarseness  6/27 corepak placed  6/29 d/c Vanco, likely not MRSA, previous MSSA, NT suction specimen  7/2 attempted removal bailey-unable to void. Bailey reinserted. transferred to NSICU. OVERNIGHT EVENTS: T max  99.6 F. He received 18 units of insulin coverage in the previous 24 hours. Attempted removal of Bailey catheter but unable to void. Bailey reinserted. He failed a swallow evaluation. Transferred to the NSICU.       PHYSICAL EXAM:    BP (!) 139/92   Pulse 112   Temp 98.8 °F (37.1 °C) (Temporal)   Resp 15   Ht 6' 2\" (1.88 m)   Wt 159 lb 4.8 oz (72.3 kg)   SpO2 97%   BMI 20.45 kg/m²     Intake/Output Summary (Last 24 hours) at 07/03/18 0715  Last data filed at 07/03/18 0600   Gross per 24 hour   Intake          2614.24 ml   Output             1752 ml   Net           862.24 ml

## 2018-07-03 NOTE — PROGRESS NOTES
AS per note of 6/29/18, patient has good rehab potential when he is out of restraints and able to follow directions. Will continue to follow closely.

## 2018-07-03 NOTE — PROGRESS NOTES
Impression: Cerebral contusion with subarachnoid hemorrhage, left  frontotemporal lobe.  Blood in the left ear canal, possibly basal skull  fracture of temporal bone. /82   Pulse 102   Temp 99.2 °F (37.3 °C)   Resp 15   Ht 6' 2\" (1.88 m)   Wt 159 lb 4.8 oz (72.3 kg)   SpO2 96%   BMI 20.45 kg/m²   There is no change in patient's neurosurgical status. Moves all extremities  to commands. No focal deficit  Can count the fingers.  Awake & oriented  Verbalizing  Improving  Will continue to follow along.

## 2018-07-03 NOTE — PROGRESS NOTES
line management. Pt initially side stepping at eob and had improved safety from previous sessions, therefore ambulated further distance. Second person to assist in line management. When beginning to ambulate pt repeatedly attempted to hold onto IV pole and required much cueing to avoid. Pt with unsteady gait and reaching for environment for support. Max cues for safety and to avoid bumping into objects. Narrow AUGUSTINE with occasional scissoring noted. Pt limited by cognitive deficits at this time. Pt was left supine with call light left by patient. Restraints donned. Time in: 1105  Time out: 1 Gerber Walker    Pt is making fair progress toward established Physical Therapy goals. Continue with physical therapy current plan of care.     Daniel Franco PTA  License Number: [id number]

## 2018-07-03 NOTE — CARE COORDINATION
Social Work/Discharge Planning:      SW received  message from pt mother. SW placed a call to pt mother to discuss d/c planning, No answer at this time, SW left message. TALISHA spoke with Louisa at [facility] in regards to pt  precert, Louisa stated that she will contact Lon Gonzalez in admissions in regards to this and will have Lon Gonzalez follow up with TALISHA. Awaiting Callback, TALISHA will follow.     Electronically signed by DENA Rodriguez on 7/3/2018 at 5:07 PM

## 2018-07-04 LAB
ANION GAP SERPL CALCULATED.3IONS-SCNC: 12 MMOL/L (ref 7–16)
BASOPHILS ABSOLUTE: 0.02 E9/L (ref 0–0.2)
BASOPHILS RELATIVE PERCENT: 0.2 % (ref 0–2)
BUN BLDV-MCNC: 43 MG/DL (ref 6–20)
CALCIUM SERPL-MCNC: 9.8 MG/DL (ref 8.6–10.2)
CHLORIDE BLD-SCNC: 103 MMOL/L (ref 98–107)
CO2: 30 MMOL/L (ref 22–29)
CREAT SERPL-MCNC: 1 MG/DL (ref 0.7–1.2)
EOSINOPHILS ABSOLUTE: 0 E9/L (ref 0.05–0.5)
EOSINOPHILS RELATIVE PERCENT: 0 % (ref 0–6)
GFR AFRICAN AMERICAN: >60
GFR NON-AFRICAN AMERICAN: >60 ML/MIN/1.73
GLUCOSE BLD-MCNC: 227 MG/DL (ref 74–109)
HCT VFR BLD CALC: 35.2 % (ref 37–54)
HEMOGLOBIN: 11.4 G/DL (ref 12.5–16.5)
IMMATURE GRANULOCYTES #: 0.09 E9/L
IMMATURE GRANULOCYTES %: 0.9 % (ref 0–5)
LYMPHOCYTES ABSOLUTE: 1.58 E9/L (ref 1.5–4)
LYMPHOCYTES RELATIVE PERCENT: 15.8 % (ref 20–42)
MCH RBC QN AUTO: 27.6 PG (ref 26–35)
MCHC RBC AUTO-ENTMCNC: 32.4 % (ref 32–34.5)
MCV RBC AUTO: 85.2 FL (ref 80–99.9)
METER GLUCOSE: 125 MG/DL (ref 70–110)
METER GLUCOSE: 180 MG/DL (ref 70–110)
METER GLUCOSE: 193 MG/DL (ref 70–110)
METER GLUCOSE: 202 MG/DL (ref 70–110)
METER GLUCOSE: 228 MG/DL (ref 70–110)
MONOCYTES ABSOLUTE: 0.88 E9/L (ref 0.1–0.95)
MONOCYTES RELATIVE PERCENT: 8.8 % (ref 2–12)
NEUTROPHILS ABSOLUTE: 7.41 E9/L (ref 1.8–7.3)
NEUTROPHILS RELATIVE PERCENT: 74.3 % (ref 43–80)
PDW BLD-RTO: 13.4 FL (ref 11.5–15)
PLATELET # BLD: 321 E9/L (ref 130–450)
PMV BLD AUTO: 10.2 FL (ref 7–12)
POTASSIUM SERPL-SCNC: 4.2 MMOL/L (ref 3.5–5)
RBC # BLD: 4.13 E12/L (ref 3.8–5.8)
SODIUM BLD-SCNC: 145 MMOL/L (ref 132–146)
WBC # BLD: 10 E9/L (ref 4.5–11.5)

## 2018-07-04 PROCEDURE — 6360000002 HC RX W HCPCS: Performed by: STUDENT IN AN ORGANIZED HEALTH CARE EDUCATION/TRAINING PROGRAM

## 2018-07-04 PROCEDURE — 6370000000 HC RX 637 (ALT 250 FOR IP): Performed by: SURGERY

## 2018-07-04 PROCEDURE — 6360000002 HC RX W HCPCS: Performed by: SURGERY

## 2018-07-04 PROCEDURE — 2580000003 HC RX 258: Performed by: STUDENT IN AN ORGANIZED HEALTH CARE EDUCATION/TRAINING PROGRAM

## 2018-07-04 PROCEDURE — 6370000000 HC RX 637 (ALT 250 FOR IP): Performed by: STUDENT IN AN ORGANIZED HEALTH CARE EDUCATION/TRAINING PROGRAM

## 2018-07-04 PROCEDURE — 80048 BASIC METABOLIC PNL TOTAL CA: CPT

## 2018-07-04 PROCEDURE — 6370000000 HC RX 637 (ALT 250 FOR IP): Performed by: NURSE PRACTITIONER

## 2018-07-04 PROCEDURE — 2000000000 HC ICU R&B

## 2018-07-04 PROCEDURE — 82962 GLUCOSE BLOOD TEST: CPT

## 2018-07-04 PROCEDURE — 2700000000 HC OXYGEN THERAPY PER DAY

## 2018-07-04 PROCEDURE — 99291 CRITICAL CARE FIRST HOUR: CPT | Performed by: SURGERY

## 2018-07-04 PROCEDURE — 85025 COMPLETE CBC W/AUTO DIFF WBC: CPT

## 2018-07-04 PROCEDURE — 36415 COLL VENOUS BLD VENIPUNCTURE: CPT

## 2018-07-04 RX ORDER — INSULIN GLARGINE 100 [IU]/ML
40 INJECTION, SOLUTION SUBCUTANEOUS 2 TIMES DAILY
Status: DISCONTINUED | OUTPATIENT
Start: 2018-07-04 | End: 2018-07-10 | Stop reason: HOSPADM

## 2018-07-04 RX ADMIN — DEXAMETHASONE SODIUM PHOSPHATE 4 MG: 4 INJECTION, SOLUTION INTRAMUSCULAR; INTRAVENOUS at 22:00

## 2018-07-04 RX ADMIN — INSULIN GLARGINE 35 UNITS: 100 INJECTION, SOLUTION SUBCUTANEOUS at 08:49

## 2018-07-04 RX ADMIN — SODIUM CHLORIDE TAB 1 GM 2 G: 1 TAB at 14:05

## 2018-07-04 RX ADMIN — SODIUM CHLORIDE TAB 1 GM 2 G: 1 TAB at 08:50

## 2018-07-04 RX ADMIN — ENOXAPARIN SODIUM 30 MG: 30 INJECTION SUBCUTANEOUS at 08:46

## 2018-07-04 RX ADMIN — Medication 5 ML: at 20:20

## 2018-07-04 RX ADMIN — RISPERIDONE 2 MG: 2 TABLET, FILM COATED ORAL at 08:50

## 2018-07-04 RX ADMIN — DEXAMETHASONE SODIUM PHOSPHATE 4 MG: 4 INJECTION, SOLUTION INTRAMUSCULAR; INTRAVENOUS at 04:12

## 2018-07-04 RX ADMIN — SODIUM CHLORIDE, PRESERVATIVE FREE 300 UNITS: 5 INJECTION INTRAVENOUS at 08:51

## 2018-07-04 RX ADMIN — SODIUM CHLORIDE TAB 1 GM 2 G: 1 TAB at 20:20

## 2018-07-04 RX ADMIN — INSULIN GLARGINE 40 UNITS: 100 INJECTION, SOLUTION SUBCUTANEOUS at 19:45

## 2018-07-04 RX ADMIN — DOCUSATE SODIUM 100 MG: 50 LIQUID ORAL at 08:50

## 2018-07-04 RX ADMIN — DOCUSATE SODIUM 100 MG: 50 LIQUID ORAL at 20:19

## 2018-07-04 RX ADMIN — PROPRANOLOL HYDROCHLORIDE 20 MG: 20 TABLET ORAL at 20:20

## 2018-07-04 RX ADMIN — PROPRANOLOL HYDROCHLORIDE 20 MG: 20 TABLET ORAL at 08:50

## 2018-07-04 RX ADMIN — INSULIN LISPRO 6 UNITS: 100 INJECTION, SOLUTION INTRAVENOUS; SUBCUTANEOUS at 11:07

## 2018-07-04 RX ADMIN — POTASSIUM CHLORIDE 40 MEQ: 20 SOLUTION ORAL at 08:50

## 2018-07-04 RX ADMIN — PROPRANOLOL HYDROCHLORIDE 20 MG: 20 TABLET ORAL at 14:05

## 2018-07-04 RX ADMIN — INSULIN LISPRO 6 UNITS: 100 INJECTION, SOLUTION INTRAVENOUS; SUBCUTANEOUS at 04:16

## 2018-07-04 RX ADMIN — SODIUM CHLORIDE, PRESERVATIVE FREE 300 UNITS: 5 INJECTION INTRAVENOUS at 22:00

## 2018-07-04 RX ADMIN — Medication 10 ML: at 20:15

## 2018-07-04 RX ADMIN — SODIUM CHLORIDE, PRESERVATIVE FREE 300 UNITS: 5 INJECTION INTRAVENOUS at 20:19

## 2018-07-04 RX ADMIN — INSULIN LISPRO 3 UNITS: 100 INJECTION, SOLUTION INTRAVENOUS; SUBCUTANEOUS at 19:45

## 2018-07-04 RX ADMIN — POTASSIUM CHLORIDE 40 MEQ: 20 SOLUTION ORAL at 20:19

## 2018-07-04 RX ADMIN — INSULIN LISPRO 3 UNITS: 100 INJECTION, SOLUTION INTRAVENOUS; SUBCUTANEOUS at 08:48

## 2018-07-04 RX ADMIN — DEXAMETHASONE SODIUM PHOSPHATE 4 MG: 4 INJECTION, SOLUTION INTRAMUSCULAR; INTRAVENOUS at 11:02

## 2018-07-04 RX ADMIN — CEFEPIME 2 G: 2 INJECTION, POWDER, FOR SOLUTION INTRAMUSCULAR; INTRAVENOUS at 16:15

## 2018-07-04 RX ADMIN — DEXAMETHASONE SODIUM PHOSPHATE 4 MG: 4 INJECTION, SOLUTION INTRAMUSCULAR; INTRAVENOUS at 16:15

## 2018-07-04 RX ADMIN — Medication 10 ML: at 08:50

## 2018-07-04 RX ADMIN — ENOXAPARIN SODIUM 30 MG: 30 INJECTION SUBCUTANEOUS at 20:30

## 2018-07-04 RX ADMIN — OXYCODONE HYDROCHLORIDE 5 MG: 5 SOLUTION ORAL at 08:50

## 2018-07-04 RX ADMIN — RISPERIDONE 3 MG: 2 TABLET, FILM COATED ORAL at 20:20

## 2018-07-04 RX ADMIN — PANTOPRAZOLE SODIUM 40 MG: 40 GRANULE, DELAYED RELEASE ORAL at 07:30

## 2018-07-04 RX ADMIN — CEFEPIME 2 G: 2 INJECTION, POWDER, FOR SOLUTION INTRAMUSCULAR; INTRAVENOUS at 08:30

## 2018-07-04 RX ADMIN — Medication 10 ML: at 22:00

## 2018-07-04 ASSESSMENT — PAIN DESCRIPTION - LOCATION: LOCATION: GENERALIZED

## 2018-07-04 ASSESSMENT — PAIN SCALES - GENERAL
PAINLEVEL_OUTOF10: 0
PAINLEVEL_OUTOF10: 2
PAINLEVEL_OUTOF10: 0
PAINLEVEL_OUTOF10: 0
PAINLEVEL_OUTOF10: 4
PAINLEVEL_OUTOF10: 0

## 2018-07-04 ASSESSMENT — PAIN DESCRIPTION - DESCRIPTORS: DESCRIPTORS: ACHING;DISCOMFORT;DULL

## 2018-07-04 ASSESSMENT — PAIN DESCRIPTION - PAIN TYPE: TYPE: ACUTE PAIN

## 2018-07-04 NOTE — PLAN OF CARE
Problem: Falls - Risk of:  Goal: Will remain free from falls  Will remain free from falls   Outcome: Met This Shift    Goal: Absence of physical injury  Absence of physical injury   Outcome: Met This Shift      Problem: Risk for Impaired Skin Integrity  Goal: Tissue integrity - skin and mucous membranes  Structural intactness and normal physiological function of skin and  mucous membranes. Outcome: Met This Shift      Problem: Restraint Use - Nonviolent/Non-Self-Destructive Behavior:  Goal: Absence of restraint indications  Absence of restraint indications   Outcome: Not Met This Shift  Pt continues to pull at corpak and climb oob when unrestrained. Pt verbally redirected. Restraints continued for pt's safety. Goal: Absence of restraint-related injury  Absence of restraint-related injury   Outcome: Met This Shift      Problem: Confusion - Acute:  Goal: Mental status will be restored to baseline  Mental status will be restored to baseline   Outcome: Not Met This Shift      Problem: Nutrition  Goal: Optimal nutrition therapy  Outcome: Met This Shift  Pt tolerating tube feeding at goal rate.

## 2018-07-04 NOTE — PROGRESS NOTES
Daily Trauma Progress Note 7/4/2018      Admit Date: 6/16/2018      Mechanism of Injury: Fall SH    INJURIES:   Patient Active Problem List   Diagnosis    Trauma    [facility] [facility]    Closed fracture of temporal bone (HCC)    Pneumonia of left lower lobe due to methicillin susceptible Staphylococcus aureus (MSSA) ([facility] [address] [facility]    Fall from ground level    Acute blood loss anemia    Insulin dependent diabetes mellitus (HCC)    Acute respiratory failure following trauma and surgery ([facility] [address]       PREVIOUS 24 HOUR EVENTS: some agitation noted overnight    Subjective:    some agitation noted overnight, remains GCS 14    Objective:     Patient Vitals for the past 8 hrs:   BP Temp Pulse Resp SpO2   07/04/18 1900 (!) 143/91 - 103 15 95 %   07/04/18 1800 (!) 144/97 98.5 °F (36.9 °C) 107 15 95 %   07/04/18 1700 (!) 133/107 - 96 18 94 %   07/04/18 1600 (!) 142/100 98.5 °F (36.9 °C) 91 18 97 %   07/04/18 1500 (!) 139/94 99.2 °F (37.3 °C) 91 20 95 %   07/04/18 1400 (!) 151/99 99.2 °F (37.3 °C) 119 18 98 %   07/04/18 1300 (!) 155/99 98.9 °F (37.2 °C) 104 18 97 %   07/04/18 1200 (!) 125/100 98.9 °F (37.2 °C) 94 13 96 %     I/O last 3 completed shifts: In: 9822 [I.V.:156; NG/GT:2231; IV Piggyback:150]  Out: 4002 [Urine:2205]  I/O this shift:   In: 48 [I.V.:10; NG/GT:40]  Out: 390 [Urine:390]    PHYSICAL:  Pain Description: none  GCS:    4 - Opens eyes on own   6 - Follows simple motor commands  4 - Seems confused, disoriented    Pupil size:  Left 5 mm    Right 5 mm    Pupil reaction: Yes    GENERAL:  Laying in bed, confused, not answering appropriately  EYES: pupils equal and reactive  HEAD: nasal cannula in place  ABDOMEN:  Soft, non-tender, non-distended  EXTREMITIES: Moves all extremities   SKIN: Warm and dry  NEURO: GCS 14 (confusion), no focal deficit    CONSULTS: NSG      Assessment:     Principal Problem:    Trauma  Active Problems:    Subarachnoid hemorrhage (HCC)    Closed fracture of temporal bone (HCC)

## 2018-07-04 NOTE — PROGRESS NOTES
Impression: Cerebral contusion with subarachnoid hemorrhage, left  frontotemporal lobe.  Blood in the left ear canal, possibly basal skull  fracture of temporal bone. /85   Pulse 90   Temp 98.7 °F (37.1 °C)   Resp 14   Ht 6' 2\" (1.88 m)   Wt 159 lb 4.8 oz (72.3 kg)   SpO2 98%   BMI 20.45 kg/m²   There is no change in patient's neurosurgical status. Moves all extremities  to commands. No focal deficit  Can count the fingers. Awake but not fully oriented  Verbalizing.  For swallowing test in AM  Improving  Will continue to follow along.

## 2018-07-05 LAB
ANION GAP SERPL CALCULATED.3IONS-SCNC: 11 MMOL/L (ref 7–16)
BASOPHILS ABSOLUTE: 0.02 E9/L (ref 0–0.2)
BASOPHILS RELATIVE PERCENT: 0.2 % (ref 0–2)
BUN BLDV-MCNC: 48 MG/DL (ref 6–20)
CALCIUM SERPL-MCNC: 10 MG/DL (ref 8.6–10.2)
CHLORIDE BLD-SCNC: 100 MMOL/L (ref 98–107)
CO2: 34 MMOL/L (ref 22–29)
CREAT SERPL-MCNC: 1 MG/DL (ref 0.7–1.2)
EOSINOPHILS ABSOLUTE: 0 E9/L (ref 0.05–0.5)
EOSINOPHILS RELATIVE PERCENT: 0 % (ref 0–6)
GFR AFRICAN AMERICAN: >60
GFR NON-AFRICAN AMERICAN: >60 ML/MIN/1.73
GLUCOSE BLD-MCNC: 207 MG/DL (ref 74–109)
HCT VFR BLD CALC: 36.7 % (ref 37–54)
HEMOGLOBIN: 11.4 G/DL (ref 12.5–16.5)
IMMATURE GRANULOCYTES #: 0.2 E9/L
IMMATURE GRANULOCYTES %: 1.7 % (ref 0–5)
LYMPHOCYTES ABSOLUTE: 2.06 E9/L (ref 1.5–4)
LYMPHOCYTES RELATIVE PERCENT: 17 % (ref 20–42)
MCH RBC QN AUTO: 27 PG (ref 26–35)
MCHC RBC AUTO-ENTMCNC: 31.1 % (ref 32–34.5)
MCV RBC AUTO: 86.8 FL (ref 80–99.9)
METER GLUCOSE: 105 MG/DL (ref 70–110)
METER GLUCOSE: 117 MG/DL (ref 70–110)
METER GLUCOSE: 157 MG/DL (ref 70–110)
METER GLUCOSE: 175 MG/DL (ref 70–110)
METER GLUCOSE: 181 MG/DL (ref 70–110)
METER GLUCOSE: 188 MG/DL (ref 70–110)
METER GLUCOSE: 209 MG/DL (ref 70–110)
MONOCYTES ABSOLUTE: 1.09 E9/L (ref 0.1–0.95)
MONOCYTES RELATIVE PERCENT: 9 % (ref 2–12)
NEUTROPHILS ABSOLUTE: 8.75 E9/L (ref 1.8–7.3)
NEUTROPHILS RELATIVE PERCENT: 72.1 % (ref 43–80)
PDW BLD-RTO: 13.4 FL (ref 11.5–15)
PLATELET # BLD: 316 E9/L (ref 130–450)
PMV BLD AUTO: 10.3 FL (ref 7–12)
POTASSIUM SERPL-SCNC: 4.1 MMOL/L (ref 3.5–5)
RBC # BLD: 4.23 E12/L (ref 3.8–5.8)
SODIUM BLD-SCNC: 145 MMOL/L (ref 132–146)
WBC # BLD: 12.1 E9/L (ref 4.5–11.5)

## 2018-07-05 PROCEDURE — 92610 EVALUATE SWALLOWING FUNCTION: CPT

## 2018-07-05 PROCEDURE — 6370000000 HC RX 637 (ALT 250 FOR IP): Performed by: STUDENT IN AN ORGANIZED HEALTH CARE EDUCATION/TRAINING PROGRAM

## 2018-07-05 PROCEDURE — 2700000000 HC OXYGEN THERAPY PER DAY

## 2018-07-05 PROCEDURE — 2580000003 HC RX 258: Performed by: STUDENT IN AN ORGANIZED HEALTH CARE EDUCATION/TRAINING PROGRAM

## 2018-07-05 PROCEDURE — 80048 BASIC METABOLIC PNL TOTAL CA: CPT

## 2018-07-05 PROCEDURE — 6370000000 HC RX 637 (ALT 250 FOR IP): Performed by: SURGERY

## 2018-07-05 PROCEDURE — 97110 THERAPEUTIC EXERCISES: CPT

## 2018-07-05 PROCEDURE — 36592 COLLECT BLOOD FROM PICC: CPT

## 2018-07-05 PROCEDURE — 97535 SELF CARE MNGMENT TRAINING: CPT

## 2018-07-05 PROCEDURE — 6370000000 HC RX 637 (ALT 250 FOR IP): Performed by: NURSE PRACTITIONER

## 2018-07-05 PROCEDURE — 97530 THERAPEUTIC ACTIVITIES: CPT

## 2018-07-05 PROCEDURE — 6360000002 HC RX W HCPCS: Performed by: STUDENT IN AN ORGANIZED HEALTH CARE EDUCATION/TRAINING PROGRAM

## 2018-07-05 PROCEDURE — 6360000002 HC RX W HCPCS: Performed by: SURGERY

## 2018-07-05 PROCEDURE — 85025 COMPLETE CBC W/AUTO DIFF WBC: CPT

## 2018-07-05 PROCEDURE — 99232 SBSQ HOSP IP/OBS MODERATE 35: CPT | Performed by: NURSE PRACTITIONER

## 2018-07-05 PROCEDURE — 2060000000 HC ICU INTERMEDIATE R&B

## 2018-07-05 PROCEDURE — 36415 COLL VENOUS BLD VENIPUNCTURE: CPT

## 2018-07-05 PROCEDURE — 97112 NEUROMUSCULAR REEDUCATION: CPT

## 2018-07-05 PROCEDURE — 82962 GLUCOSE BLOOD TEST: CPT

## 2018-07-05 RX ORDER — SODIUM CHLORIDE 1000 MG
1 TABLET, SOLUBLE MISCELLANEOUS 3 TIMES DAILY
Status: DISCONTINUED | OUTPATIENT
Start: 2018-07-05 | End: 2018-07-10 | Stop reason: HOSPADM

## 2018-07-05 RX ORDER — POTASSIUM CHLORIDE 20MEQ/15ML
40 LIQUID (ML) ORAL 2 TIMES DAILY
Status: DISCONTINUED | OUTPATIENT
Start: 2018-07-05 | End: 2018-07-09 | Stop reason: CLARIF

## 2018-07-05 RX ORDER — SODIUM CHLORIDE 1000 MG
1 TABLET, SOLUBLE MISCELLANEOUS 3 TIMES DAILY
Status: CANCELLED | OUTPATIENT
Start: 2018-07-05

## 2018-07-05 RX ORDER — PROPRANOLOL HYDROCHLORIDE 20 MG/1
20 TABLET ORAL 3 TIMES DAILY
Status: DISCONTINUED | OUTPATIENT
Start: 2018-07-05 | End: 2018-07-10 | Stop reason: HOSPADM

## 2018-07-05 RX ADMIN — Medication 5 ML: at 20:48

## 2018-07-05 RX ADMIN — OXYCODONE HYDROCHLORIDE 5 MG: 5 SOLUTION ORAL at 13:45

## 2018-07-05 RX ADMIN — SODIUM CHLORIDE TAB 1 GM 1 G: 1 TAB at 13:45

## 2018-07-05 RX ADMIN — INSULIN LISPRO 3 UNITS: 100 INJECTION, SOLUTION INTRAVENOUS; SUBCUTANEOUS at 08:18

## 2018-07-05 RX ADMIN — SODIUM CHLORIDE TAB 1 GM 2 G: 1 TAB at 08:08

## 2018-07-05 RX ADMIN — POTASSIUM CHLORIDE 40 MEQ: 20 SOLUTION ORAL at 08:06

## 2018-07-05 RX ADMIN — PROPRANOLOL HYDROCHLORIDE 20 MG: 20 TABLET ORAL at 08:18

## 2018-07-05 RX ADMIN — Medication 10 ML: at 20:47

## 2018-07-05 RX ADMIN — INSULIN LISPRO 6 UNITS: 100 INJECTION, SOLUTION INTRAVENOUS; SUBCUTANEOUS at 00:27

## 2018-07-05 RX ADMIN — PROPRANOLOL HYDROCHLORIDE 20 MG: 20 TABLET ORAL at 20:47

## 2018-07-05 RX ADMIN — RISPERIDONE 3 MG: 2 TABLET, FILM COATED ORAL at 20:47

## 2018-07-05 RX ADMIN — DEXAMETHASONE SODIUM PHOSPHATE 4 MG: 4 INJECTION, SOLUTION INTRAMUSCULAR; INTRAVENOUS at 04:22

## 2018-07-05 RX ADMIN — CEFEPIME 2 G: 2 INJECTION, POWDER, FOR SOLUTION INTRAMUSCULAR; INTRAVENOUS at 00:22

## 2018-07-05 RX ADMIN — ENOXAPARIN SODIUM 30 MG: 30 INJECTION SUBCUTANEOUS at 20:47

## 2018-07-05 RX ADMIN — Medication 10 ML: at 00:22

## 2018-07-05 RX ADMIN — Medication 10 ML: at 04:22

## 2018-07-05 RX ADMIN — ENOXAPARIN SODIUM 30 MG: 30 INJECTION SUBCUTANEOUS at 08:06

## 2018-07-05 RX ADMIN — INSULIN LISPRO 3 UNITS: 100 INJECTION, SOLUTION INTRAVENOUS; SUBCUTANEOUS at 12:03

## 2018-07-05 RX ADMIN — RISPERIDONE 3 MG: 2 TABLET, FILM COATED ORAL at 08:09

## 2018-07-05 RX ADMIN — CEFEPIME 2 G: 2 INJECTION, POWDER, FOR SOLUTION INTRAMUSCULAR; INTRAVENOUS at 08:08

## 2018-07-05 RX ADMIN — DOCUSATE SODIUM 100 MG: 50 LIQUID ORAL at 20:48

## 2018-07-05 RX ADMIN — PANTOPRAZOLE SODIUM 40 MG: 40 GRANULE, DELAYED RELEASE ORAL at 05:53

## 2018-07-05 RX ADMIN — INSULIN GLARGINE 40 UNITS: 100 INJECTION, SOLUTION SUBCUTANEOUS at 08:08

## 2018-07-05 RX ADMIN — POTASSIUM CHLORIDE 40 MEQ: 20 SOLUTION ORAL at 20:48

## 2018-07-05 RX ADMIN — PROPRANOLOL HYDROCHLORIDE 20 MG: 20 TABLET ORAL at 13:45

## 2018-07-05 RX ADMIN — DOCUSATE SODIUM 100 MG: 50 LIQUID ORAL at 08:09

## 2018-07-05 RX ADMIN — INSULIN GLARGINE 40 UNITS: 100 INJECTION, SOLUTION SUBCUTANEOUS at 20:48

## 2018-07-05 RX ADMIN — SODIUM CHLORIDE, PRESERVATIVE FREE 300 UNITS: 5 INJECTION INTRAVENOUS at 08:07

## 2018-07-05 RX ADMIN — SODIUM CHLORIDE TAB 1 GM 1 G: 1 TAB at 20:47

## 2018-07-05 RX ADMIN — INSULIN LISPRO 3 UNITS: 100 INJECTION, SOLUTION INTRAVENOUS; SUBCUTANEOUS at 16:38

## 2018-07-05 RX ADMIN — INSULIN LISPRO 3 UNITS: 100 INJECTION, SOLUTION INTRAVENOUS; SUBCUTANEOUS at 04:27

## 2018-07-05 RX ADMIN — CEFEPIME 2 G: 2 INJECTION, POWDER, FOR SOLUTION INTRAMUSCULAR; INTRAVENOUS at 17:27

## 2018-07-05 RX ADMIN — Medication 10 ML: at 08:08

## 2018-07-05 RX ADMIN — SODIUM CHLORIDE, PRESERVATIVE FREE 300 UNITS: 5 INJECTION INTRAVENOUS at 20:48

## 2018-07-05 ASSESSMENT — PAIN SCALES - GENERAL
PAINLEVEL_OUTOF10: 0
PAINLEVEL_OUTOF10: 5
PAINLEVEL_OUTOF10: 0
PAINLEVEL_OUTOF10: 0

## 2018-07-05 NOTE — PLAN OF CARE
Problem: Falls - Risk of:  Goal: Will remain free from falls  Will remain free from falls   Outcome: Met This Shift    Goal: Absence of physical injury  Absence of physical injury   Outcome: Met This Shift      Problem: Risk for Impaired Skin Integrity  Goal: Tissue integrity - skin and mucous membranes  Structural intactness and normal physiological function of skin and  mucous membranes. Outcome: Met This Shift      Problem: Restraint Use - Nonviolent/Non-Self-Destructive Behavior:  Goal: Absence of restraint indications  Absence of restraint indications   Outcome: Not Met This Shift  Pt continues to pull at corpak and attempting to climb OOB when unrestrained. Pt verbally redirected. Restraints continued for pt's safety.   Goal: Absence of restraint-related injury  Absence of restraint-related injury   Outcome: Met This Shift      Problem: Confusion - Acute:  Goal: Mental status will be restored to baseline  Mental status will be restored to baseline   Outcome: Not Met This Shift  Pt alert to person only      Problem: Nutrition  Goal: Optimal nutrition therapy  Outcome: Met This Shift  Pt tolerating tube feeding at goal rate      Problem: Pain:  Goal: Pain level will decrease  Pain level will decrease   Outcome: Met This Shift

## 2018-07-05 NOTE — PROGRESS NOTES
Hx of substance abuse - Monitor neuro status. Neurosurgery following. Risperdal--increase. CV:  Sinus tachycardia - - Monitor hemodynamics. Inderal.  Pulm: Respiratory insufficiency. Tracheitis- Monitor RR & SpO2. O2 as needed. Encourage cough, SMI  & deep breathing. GI: Dysphagia. Protein calorie malnutrition. BMI 20 - monitor bowel function. NPO. Tube feeds: Diabetic formula at  75 ml per hour. Zofran. Phenergan. Bowel regime. Renal:  Failed a voiding trial  Hx of Hyponatremia  (resolved) - Monitor BUN & Cr. Monitor electrolytes & replace as needed. Monitor I & O. Cifuentes catheter. ID: Tracheitis- Monitor for sepsis. Cefepime day 7/8. Endocrine: Hyperglycemia. HX of diabetes  - Monitor BS.  ISS. Lantus--increase. MSK: Deconditioned  - ROM. Turn & reposition. PT & OT AM PAC score 10/24. PMR to see for TBI. Monitor for skin breakdown. Heme: Acute blood loss anemia - Monitor CBC. Bowel regime: Colace. Senna. MOM. Ducolax suppository. Pain control/Sedation:  Oxycodone. Tylenol. DVT prophylaxis: SCDs. Lovenox. GI prophylaxis: .  TF. Glucose protocol:  ISS  Cifuentes: Keep in place for critical care monitoring of fluid balance. Ancillary consults:   Neurosurgery. PMR. Patient/Family update: Will update when available. Code status:  Full    Disposition:  NSICU. Leopoldo Neno  7/4/2018  9:38 PM    CC TIME:  I spent 35 min managing this patients critical issues which included impulsivity, TBI, DM excluding time teaching and performing procedures.

## 2018-07-05 NOTE — FLOWSHEET NOTE
Patient pulls at lines and tubes when released. Patient also attempts to swing legs out of bed. Four point soft restraints continued for patient safety.   Will continue to monitor

## 2018-07-05 NOTE — PROGRESS NOTES
SPEECH/LANGUAGE PATHOLOGY  BEDSIDE SWALLOWING EVALUATION    PATIENT NAME:  Adelita Healdey      :  1970      TODAY'S DATE:  2018    SUMMARY OF EVALUATION     DYSPHAGIA DIAGNOSIS:  Marked pharyngeal dysphagia      DIET RECOMMENDATIONS: NPO (nothing by mouth including oral meds)     FEEDING RECOMMENDATIONS:    []No assistance required   []Stand by assist    []Full assistance required  []Set up required    []Supervision with all PO intake     []Double swallow    []Chin tuck   []Multiple swallow     []Small bites/sips      []Alternate solids / liquids  []Check for oral pocketing   []No straw    []Throat clear       []Spoon sip liquids   []Effortful swallow   []   [] Malnutrition indicators have been identified and nursing has been notified to ensure a dietary consult is ordered.       THERAPY RECOMMENDATIONS:       []  Therapy is not recommended       [x]  Therapy is recommended to:     []  Improve oral motor strength and range of motion     [x]  Improve tongue base retraction      [x]  Improve laryngeal strength and range of motion     []  Mealtime assessment of patient's tolerance of prescribed diet     []  Repeat bedside swallow study in    []  Video Swallowing Evaluation is recommended and requires a Physician order                PROCEDURE     Consistencies Administered During the Evaluation   Liquids: [x]  Thin    []  Nectar   []  Honey   Solids:  [x]  Pureed/Pudding   []  Soft Solid   []  Cookie   Other:      Method of Intake:   []  Cup  [x]  Spoon  [x]  Straw  []  Self Fed  [x]  Fed by Clinician     Position:   [x]  Upright seated ([x]In bed [] in chair)   []  Reclined in bed                   RESULTS     Oral Stage:   []  Normal   [x]  Functional  []  Abnormal     []  Dentition: ([]  natural  []  missing teeth  []  edentulous  []  partials  [] dentures )    []  Inadequate labial seal resulting anterior labial spillage from ([] right  [] left  [] midline )    []  Decreased mastication due to ([]

## 2018-07-05 NOTE — PROGRESS NOTES
OT BEDSIDE TREATMENT NOTE      Date:2018  Patient Name: Carl Cortes  MRN: [id number]  : 1970  Room: [facility]8/[facility]-A     Daily AM PAC:   G- Code: CL    Diagnosis: Trauma; L ICH  Pertinent Medical History: DM     Precautions: falls, NGT, , O2 3L O2     Home Living: Pt questionable historian; pt lives with mother and father in a 1 story home with 2 stairs to enter and one rail; bed/bath on main level  Bathroom setup: walk-in shower with no safety bars; standard commode  Equipment owned: None     Prior Level of Function: Independent with ADLs and with IADLs; using no device for ambulation. Driving: yes  Occupation: works \"odd jobs\"     Pain: Pt states having no pain at this time just tired. Cognition: Pt is oriented to self, place, and month. Pt claims it is  and that the president is yancy. Goals:  GOAL 1: Pt will improve UB dressing/bathing to min A while seated EOB   GOAL 2: Pt will improve LB dressing/bathing to min A with use of AE PRN  GOAL 3: Pt will improve all functional transfers to min A from standard surface height  GOAL 4: Pt will improve grooming/hygiene tasks to [address] 5: Pt will improve self feeding task to modified independent when medically appropriate  GOAL 6: Pt will demo G- activity tolerance/endurance during 15-20 min ADL task   GOAL 7: Pt will demo G- tolerance for PROM and AAROM in all planes to improve BUE strength/functioning for ADL tasks  GOAL 8: Pt will demo G- dynamic sitting balance EOB during ADL tasks  GOAL 9: Pt will correctly squeeze hand on the letter A during the sequence CASABLANCA 0-1 errors. Functional Assessment:   Initial Status 18 Current status  Treatment    Feeding  Dependent  NGT  NGT NGT   Grooming  Max A  Attempted oral hygiene with [place]te and mouthwash at EOB; [place] A for task completion  Max A;     [place] to wash face while pt sitting on the EOB with Min A to increase sitting balance.     Min A;     To wash face and swab mouth out while sitting up on the EOB. Upper Body Dressing Max A  Max A;     To clemente/doff gown while in supine.     Mod A;    Mod A with Min verbal prompting to properly clemente/doff gown while sitting EOB. Lower Body Dressing Dependent Dep;     To clemente socks.     Mod A; To clemente/doff socks while in sitting with Increased time for LLE and Mod verbal prompting for proper technique. Bathing Dependent N/T    N/T   Toileting  Dependent Dep;     Min A to roll to complete hygiene with Dep A due to incontinence of bowel.    N/T;    Catheter present    Bed Mobility  Supine to Sit: Mod A  Sit to Supine: Dependent  Sup > Sit: Max A  Rolling: Min A;     Pt required Min A to roll from side to side and Max A to transfer from supine to sitting position with Min verbal prompting to increase safety.      Pt sat up on the EOB for 10 minutes with Min A while completing exercises to increase strength and tolerance.    Sup <> Sit: Min A;    Pt requires Min verbal prompting for proper technique/safety of transferring from supine to/from sitting position. Pt sat up on the EOB for 10 minutes with SBA while performing exercises and ADL tasks. Functional Transfers Sit to Stand: Max A  Stand to Sit: Max A  Sit <>Stand: Mod A x2;     Mod A of 2 to transfer from sit to standing position x2 with Mod verbal prompting due to leaning to R side and pt could not extend RLE.    Sit <> stand: Min A;    Min verbal prompting for proper hand placement and body alignment while transferring on/off surfaces. Functional Mobility Max A   for side steps to [facility] N/A    Mobility: Mod/Min A;    Pt requires Mod A with HHA and Min A with use of w/w to ambulate through room and hallway with Mod verbal prompting for increased direction. Pt seen supine in bed upon therapist arrival and was agreeable to therapy. Pt able to correctly state name, place, and month. Pt was assisted to sit EOB and able to participate in BUE exercises.  Pt

## 2018-07-05 NOTE — PROGRESS NOTES
evaluation/treatment. Pt more alert and oriented and less impulsive this date compared to previous sessions. Pt required cues for safety and use of AD -- pt was mildly unsteady with ambulation and ambulated with very slow gait speed. Pt tolerated treatment well. Demonstrates poor short term memory and poor recall of facts when asked in session. Recommend intensive PT services prior to return home. At end of session pt supine with all 4 restraints tied with call light within reach and all needs met. Patient education  Pt was educated on see above     Patient response to education:   Pt verbalized understanding Pt demonstrated skill Pt requires further education in this area   Yes  partial Yes        Time in: [phone]  Time out: 0911    Pt is making progress toward established Physical Therapy goals. Continue with physical therapy current plan of care.     Elian Elise DPT  License Number: [id number]

## 2018-07-05 NOTE — PLAN OF CARE
Problem: Falls - Risk of:  Goal: Will remain free from falls  Will remain free from falls   Outcome: Met This Shift      Problem: Risk for Impaired Skin Integrity  Goal: Tissue integrity - skin and mucous membranes  Structural intactness and normal physiological function of skin and  mucous membranes.    Outcome: Met This Shift      Problem: Restraint Use - Nonviolent/Non-Self-Destructive Behavior:  Goal: Absence of restraint indications  Absence of restraint indications   Outcome: Not Met This Shift    Goal: Absence of restraint-related injury  Absence of restraint-related injury   Outcome: Met This Shift

## 2018-07-05 NOTE — CARE COORDINATION
7/5  Transition of care notes  Transfer from sicu   sah  S/p fall  Has corpak  pmr following  [place] x2  For gen floor transfer  Pt eval  ampac  11/24  Walked 2 feet  Side step  Ok for gen floor  Per notes plan for aru vs [place] RN Case Manager

## 2018-07-05 NOTE — PROGRESS NOTES
11 ml       General appearance:  Comfortable. Pain Description: none    NEUROLOGIC:     RASS Score:  + 1. GCS:    3 - Opens eyes to loud noise or command   6 - Follows simple motor commands  4 - Seems confused, disoriented       Pupil size:  Left 3 mm  Right 3 mm  Pupil reaction: Yes   PERRLA  Wiggles fingers: Left Yes Right Yes  Hand grasp:   Left decreased     Right decreased  Wiggles toes: Left Yes    Right Yes  Plantar flexion: Left decreased    Right decreased    CONSTITUTIONAL: No acute distress, lying in hospital bed. CARDIOVASCULAR: S1 S2, regular rate, regular rhythm, no murmur/gallop/rub. Monitor: NSR  PULMONARY: Bilaterally clear. No rhonchi/rales/wheezes, no use of accessory muscles. O2 at 2 L nc. RENAL:Cifuentes to gravity, clear yellow urine. Fluid balance for previous 24 hours:   + 11 ml. ABDOMEN: Soft, nontender, nondistended, nontympanic, normal bowel sounds. Corpak tube in place. Tube feeding: Diabetic at 75 ml per hour. MUSCULOSKELETAL:  Moves all extremities purposefully, 5/5 strength   SKIN/EXTREMITIES: No rashes/ecchymosis, no edema/clubbing, warm/dry, good capillary refill. Peripheral IVs.   PICC:  Left basilic. Day 11. No redness. No palpable cord.      Recent Labs      07/03/18 0425 07/04/18 0410 07/05/18 0422   WBC  7.3  10.0  12.1*   HGB  11.2*  11.4*  11.4*   HCT  35.8*  35.2*  36.7*   MCV  86.3  85.2  86.8   PLT  332  321  316       Recent Labs      07/03/18   0425 07/04/18 0410 07/05/18 0422   NA  141  145  145   K  4.2  4.2  4.1   CO2  31*  30*  34*   BUN  39*  43*  48*   CREATININE  1.1  1.0  1.0     ASSESSMENT/PLAN:       Principal Problem:    Trauma  Active Problems:    Subarachnoid hemorrhage (HCC)    Closed fracture of temporal bone (HCC)    Pneumonia of left lower lobe due to methicillin susceptible Staphylococcus aureus (MSSA) ([facility] [address]    Fall from ground level    Acute blood loss anemia    Insulin dependent diabetes mellitus (HCC)    Acute respiratory failure following trauma and surgery ([facility] [address]  Resolved Problems:    * No resolved hospital problems. *    Neuro: Found down. A fall from standing height. Traumatic brain injury. Temporal bone fracture. Hx of substance abuse - Monitor neuro status. Neurosurgery following. Risperdal.    CV:  Sinus tachycardia - - Monitor hemodynamics. Inderal.  Pulm: Respiratory insufficiency. Tracheitis- Monitor RR & SpO2. O2 as needed. Encourage cough, SMI  & deep breathing. GI: Dysphagia. Protein calorie malnutrition. BMI 20 - monitor bowel function. NPO. Tube feeds: Diabetic formula at  75 ml per hour. Zofran. Phenergan. Bowel regime. Renal:  Failed a voiding trial  Hx of Hyponatremia  (resolved) - Monitor BUN & Cr. Monitor electrolytes & replace as needed. Monitor I & O. Cifuentes catheter. ID: Tracheitis- Monitor for sepsis. Cefepime day 6/8. Endocrine: Hyperglycemia. HX of diabetes  - Monitor BS.  ISS. Lantus. MSK: Deconditioned  - ROM. Turn & reposition. PT & OT AM PAC score 10/24. PMR following. Monitor for skin breakdown. Heme: Acute blood loss anemia - Monitor CBC. Bowel regime: Colace. Senna. MOM. Ducolax suppository. Pain control/Sedation:  Oxycodone. Tylenol. DVT prophylaxis: SCDs. Lovenox. GI prophylaxis: .  TF. Glucose protocol:  ISS  Cifuentes: Keep in place for critical care monitoring of fluid balance. Ancillary consults:   Neurosurgery. PMR. Patient/Family update: Will update when available. Code status:  Full    Disposition:  NSICU. Transfer to general floor with sitter.       Electronically signed by Lisa Rodriguez RN MSN APRN-CCNS CCRN 7/5/2018 12:01 PM

## 2018-07-06 LAB
ANION GAP SERPL CALCULATED.3IONS-SCNC: 11 MMOL/L (ref 7–16)
BASOPHILS ABSOLUTE: 0.06 E9/L (ref 0–0.2)
BASOPHILS RELATIVE PERCENT: 0.5 % (ref 0–2)
BUN BLDV-MCNC: 47 MG/DL (ref 6–20)
CALCIUM SERPL-MCNC: 9.7 MG/DL (ref 8.6–10.2)
CHLORIDE BLD-SCNC: 102 MMOL/L (ref 98–107)
CO2: 35 MMOL/L (ref 22–29)
CREAT SERPL-MCNC: 1.2 MG/DL (ref 0.7–1.2)
EOSINOPHILS ABSOLUTE: 0.04 E9/L (ref 0.05–0.5)
EOSINOPHILS RELATIVE PERCENT: 0.3 % (ref 0–6)
GFR AFRICAN AMERICAN: >60
GFR NON-AFRICAN AMERICAN: >60 ML/MIN/1.73
GLUCOSE BLD-MCNC: 79 MG/DL (ref 74–109)
HCT VFR BLD CALC: 38.8 % (ref 37–54)
HEMOGLOBIN: 12.1 G/DL (ref 12.5–16.5)
IMMATURE GRANULOCYTES #: 0.37 E9/L
IMMATURE GRANULOCYTES %: 3 % (ref 0–5)
LYMPHOCYTES ABSOLUTE: 3.12 E9/L (ref 1.5–4)
LYMPHOCYTES RELATIVE PERCENT: 24.9 % (ref 20–42)
MCH RBC QN AUTO: 27 PG (ref 26–35)
MCHC RBC AUTO-ENTMCNC: 31.2 % (ref 32–34.5)
MCV RBC AUTO: 86.6 FL (ref 80–99.9)
METER GLUCOSE: 102 MG/DL (ref 70–110)
METER GLUCOSE: 107 MG/DL (ref 70–110)
METER GLUCOSE: 135 MG/DL (ref 70–110)
METER GLUCOSE: 93 MG/DL (ref 70–110)
METER GLUCOSE: 97 MG/DL (ref 70–110)
MONOCYTES ABSOLUTE: 1.7 E9/L (ref 0.1–0.95)
MONOCYTES RELATIVE PERCENT: 13.6 % (ref 2–12)
NEUTROPHILS ABSOLUTE: 7.25 E9/L (ref 1.8–7.3)
NEUTROPHILS RELATIVE PERCENT: 57.7 % (ref 43–80)
PDW BLD-RTO: 13.8 FL (ref 11.5–15)
PLATELET # BLD: 298 E9/L (ref 130–450)
PMV BLD AUTO: 10.3 FL (ref 7–12)
POIKILOCYTES: ABNORMAL
POLYCHROMASIA: ABNORMAL
POTASSIUM SERPL-SCNC: 3.7 MMOL/L (ref 3.5–5)
RBC # BLD: 4.48 E12/L (ref 3.8–5.8)
SODIUM BLD-SCNC: 148 MMOL/L (ref 132–146)
SPHEROCYTES: ABNORMAL
WBC # BLD: 12.5 E9/L (ref 4.5–11.5)

## 2018-07-06 PROCEDURE — 6370000000 HC RX 637 (ALT 250 FOR IP): Performed by: STUDENT IN AN ORGANIZED HEALTH CARE EDUCATION/TRAINING PROGRAM

## 2018-07-06 PROCEDURE — 1200000000 HC SEMI PRIVATE

## 2018-07-06 PROCEDURE — 6360000002 HC RX W HCPCS: Performed by: STUDENT IN AN ORGANIZED HEALTH CARE EDUCATION/TRAINING PROGRAM

## 2018-07-06 PROCEDURE — 2580000003 HC RX 258: Performed by: STUDENT IN AN ORGANIZED HEALTH CARE EDUCATION/TRAINING PROGRAM

## 2018-07-06 PROCEDURE — 92526 ORAL FUNCTION THERAPY: CPT

## 2018-07-06 PROCEDURE — 97530 THERAPEUTIC ACTIVITIES: CPT

## 2018-07-06 PROCEDURE — 36415 COLL VENOUS BLD VENIPUNCTURE: CPT

## 2018-07-06 PROCEDURE — 80048 BASIC METABOLIC PNL TOTAL CA: CPT

## 2018-07-06 PROCEDURE — 6370000000 HC RX 637 (ALT 250 FOR IP): Performed by: SURGERY

## 2018-07-06 PROCEDURE — 99232 SBSQ HOSP IP/OBS MODERATE 35: CPT | Performed by: SURGERY

## 2018-07-06 PROCEDURE — 6370000000 HC RX 637 (ALT 250 FOR IP): Performed by: NURSE PRACTITIONER

## 2018-07-06 PROCEDURE — 85025 COMPLETE CBC W/AUTO DIFF WBC: CPT

## 2018-07-06 PROCEDURE — 97535 SELF CARE MNGMENT TRAINING: CPT

## 2018-07-06 PROCEDURE — 82962 GLUCOSE BLOOD TEST: CPT

## 2018-07-06 RX ORDER — ACETAMINOPHEN 160 MG/5ML
650 SOLUTION ORAL EVERY 4 HOURS PRN
Status: DISCONTINUED | OUTPATIENT
Start: 2018-07-06 | End: 2018-07-10 | Stop reason: HOSPADM

## 2018-07-06 RX ADMIN — POTASSIUM CHLORIDE 40 MEQ: 20 SOLUTION ORAL at 09:04

## 2018-07-06 RX ADMIN — DOCUSATE SODIUM 100 MG: 50 LIQUID ORAL at 09:04

## 2018-07-06 RX ADMIN — SODIUM CHLORIDE TAB 1 GM 1 G: 1 TAB at 16:17

## 2018-07-06 RX ADMIN — PROPRANOLOL HYDROCHLORIDE 20 MG: 20 TABLET ORAL at 09:05

## 2018-07-06 RX ADMIN — SODIUM CHLORIDE, PRESERVATIVE FREE 300 UNITS: 5 INJECTION INTRAVENOUS at 22:00

## 2018-07-06 RX ADMIN — SODIUM CHLORIDE TAB 1 GM 1 G: 1 TAB at 09:05

## 2018-07-06 RX ADMIN — RISPERIDONE 3 MG: 2 TABLET, FILM COATED ORAL at 21:59

## 2018-07-06 RX ADMIN — PROPRANOLOL HYDROCHLORIDE 20 MG: 20 TABLET ORAL at 16:16

## 2018-07-06 RX ADMIN — ENOXAPARIN SODIUM 30 MG: 30 INJECTION SUBCUTANEOUS at 22:05

## 2018-07-06 RX ADMIN — SODIUM CHLORIDE TAB 1 GM 1 G: 1 TAB at 22:01

## 2018-07-06 RX ADMIN — OXYCODONE HYDROCHLORIDE 5 MG: 5 SOLUTION ORAL at 21:59

## 2018-07-06 RX ADMIN — PANTOPRAZOLE SODIUM 40 MG: 40 GRANULE, DELAYED RELEASE ORAL at 06:11

## 2018-07-06 RX ADMIN — Medication 10 ML: at 09:45

## 2018-07-06 RX ADMIN — SODIUM CHLORIDE, PRESERVATIVE FREE 300 UNITS: 5 INJECTION INTRAVENOUS at 09:05

## 2018-07-06 RX ADMIN — POTASSIUM CHLORIDE 40 MEQ: 20 SOLUTION ORAL at 22:04

## 2018-07-06 RX ADMIN — Medication 10 ML: at 22:00

## 2018-07-06 RX ADMIN — INSULIN GLARGINE 40 UNITS: 100 INJECTION, SOLUTION SUBCUTANEOUS at 09:06

## 2018-07-06 RX ADMIN — DOCUSATE SODIUM 100 MG: 50 LIQUID ORAL at 22:00

## 2018-07-06 RX ADMIN — RISPERIDONE 3 MG: 2 TABLET, FILM COATED ORAL at 09:05

## 2018-07-06 RX ADMIN — PROPRANOLOL HYDROCHLORIDE 20 MG: 20 TABLET ORAL at 22:01

## 2018-07-06 RX ADMIN — ENOXAPARIN SODIUM 30 MG: 30 INJECTION SUBCUTANEOUS at 09:05

## 2018-07-06 RX ADMIN — INSULIN GLARGINE 40 UNITS: 100 INJECTION, SOLUTION SUBCUTANEOUS at 22:13

## 2018-07-06 ASSESSMENT — PAIN SCALES - GENERAL
PAINLEVEL_OUTOF10: 0
PAINLEVEL_OUTOF10: 0
PAINLEVEL_OUTOF10: 7

## 2018-07-06 ASSESSMENT — PAIN DESCRIPTION - PAIN TYPE: TYPE: ACUTE PAIN

## 2018-07-06 ASSESSMENT — PAIN DESCRIPTION - DESCRIPTORS: DESCRIPTORS: SORE;ACHING;DISCOMFORT

## 2018-07-06 ASSESSMENT — PAIN DESCRIPTION - LOCATION: LOCATION: GENERALIZED

## 2018-07-06 NOTE — CARE COORDINATION
Spoke with mom over phone. Explained that pt was accepted at [address] [id number] rehab if she prefers vs [facility]. Mom wishes pt to go to acute rehab for aggressive therapy. Notified Susie Couch in acute rehab. She will start precert.

## 2018-07-06 NOTE — PROGRESS NOTES
water + 120 ml flush (1569 ml free water)   · Goal TF & Flush Orders Provides: TF at goal   · Anthropometric Measures:  · Ht: 6' 2\" (188 cm)   · Current Body Wt: 172 lb (78 kg) (bedscale 7/6 )  · Admission Body Wt: 177 lb (80.3 kg) (bedscale )  · % Weight Change: noted wt fluctuations since admitted, currently w/ + fl bal, wt closer to admit wt ,     · Ideal Body Wt: 190 lb (86.2 kg), % Ideal Body 91%   · BMI Classification: BMI 18.5 - 24.9 Normal Weight  · Comparative Standards (Estimated Nutrition Needs):  · Estimated Daily Total Kcal: 2761-5495 kcal  (MSJ 1734 x 1.2 SF)  · Estimated Daily Protein (g): 105-120 gm     Estimated Intake vs Estimated Needs: Intake Exceeds Needs (w/ current TF)    Nutrition Risk Level: Moderate    Nutrition Interventions:   Continue NPO, Modify current Tube Feeding (Recommend decrease rate to Diabetic @ 70 ml/hr + 1 protein modular to provide 1680 ml TV, 2016 kcal (2120 kcal w/ pro mod), 101 gm pro (127 gm pro w/ pro mod), 1352 ml free water  )  Continued Inpatient Monitoring, No recommendations at this time    Nutrition Evaluation:   · Evaluation: Goals set   · Goals: pt to tolerate TF at goal    · Monitoring: NPO Status, TF Intake, TF Tolerance, Gastric Residuals, Skin Integrity, Fluid Balance, Mental Status/Confusion, Weight, Comparative Standards, Pertinent Labs, Chewing/Swallowing    See Adult Nutrition Doc Flowsheet for more detail.      Electronically signed by Kailyn Poole RD, CORI on 7/6/18 at 10:32 AM    Contact Number: [phone]

## 2018-07-06 NOTE — PROGRESS NOTES
[facility]  ATTENDING PHYSICIAN PROGRESS NOTE     I have examined the patient, reviewed the record, and discussed the case with the APN/  Resident. I have reviewed all relevant labs and imaging data. Please refer to the  APN/ resident's note. I agree with the  assessment and plan with the following corrections/ additions. The following summarizes my clinical findings and independent assessment. CC: traumatic brain injury    S. Pt was transferred out of ICU  He becomes agitated but is redirectably    O.  GCS 14  Nasal enteric tube present  s1s2  Lungs coarse  Abdomen soft nt nd    ASSESSMENT:  Principal Problem:    Trauma  Active Problems:    Subarachnoid hemorrhage (HCC)    Closed fracture of temporal bone (HCC)    Pneumonia of left lower lobe due to methicillin susceptible Staphylococcus aureus (MSSA) ([facility] [address]    Fall from ground level    Acute blood loss anemia    Insulin dependent diabetes mellitus ([facility] [address]    Acute respiratory failure following trauma and surgery ([facility] [address]  Resolved Problems:    * No resolved hospital problems.  *       PLAN:  Traumatic brain injury  SAH  Brainstorming--on propranol and risperdal    Moderate calorie protein malnutrition--TF    Diabetes--on lantus/ SSI    PT/OT  PMR consult  DVT Proph: ANUJ/ lovenox Cheryle Pita, MD, FACS  7/6/2018  1:16 PM

## 2018-07-06 NOTE — CARE COORDINATION
Transferred to Gen floor. Very alert, cooperative, pleasant. Now out of restraints. Still requiring corepak d/t dysphagia. Pt meets criteria for acute rehab. If mom agrees with acute rehab, they will start precert.

## 2018-07-06 NOTE — PROGRESS NOTES
Trauma resident aware of patient mews score of 3 earlier and now. resident stated he was having 3's off and on for weeks. Resident was also informed with swabbing his mouth patient was choking.

## 2018-07-06 NOTE — PATIENT CARE CONFERENCE
P Quality Flow/Interdisciplinary Rounds Progress Note        Quality Flow Rounds held on July 6, 2018    Disciplines Attending:  Nursing Leadership, Bedside Nurse, , and Case Management. Lakeshia Marques was admitted on 6/16/2018  1:22 AM    Anticipated Discharge Date:  Expected Discharge Date: 07/10/18    Disposition:    Cl Score:  Cl Scale Score: 17    Readmission Risk              Risk of Unplanned Readmission:        18             Discussed patient goal for the day, patient clinical progression, and barriers to discharge. The following Goal(s) of the Day/Commitment(s) have been identified: Possible swallow study. PM&RMaykel Brown  July 6, 2018

## 2018-07-06 NOTE — PROGRESS NOTES
Subarachnoid hemorrhage (HCC)  Closed fracture of temporal bone (HCC)  There is no change in patient's neurosurgical status. Nothing new to add from neurosurgical stand point at this time.   Awaiting rehab placement  Will continue to follow along.

## 2018-07-06 NOTE — PROGRESS NOTES
OT BEDSIDE TREATMENT NOTE      Date:2018  Patient Name: Raiza Pozo  MRN: [id number]  : 1970  Room: [address] [place], [place] [address]-A     Daily AM PAC:   G- Code: CL    Diagnosis: Trauma; L ICH  Pertinent Medical History: DM     Precautions: falls, NGT, ,      Home Living: Pt questionable historian; pt lives with mother and father in a 1 story home with 2 stairs to enter and one rail; bed/bath on main level  Bathroom setup: walk-in shower with no safety bars; standard commode  Equipment owned: None     Prior Level of Function: Independent with ADLs and with IADLs; using no device for ambulation. Driving: yes  Occupation: works \"odd jobs\"     Pain: Pt states having no pain. Cognition: Pt is alert and is oriented to place and name however confused on date and time. Goals:  GOAL 1: Pt will improve UB dressing/bathing to min A while seated EOB   GOAL 2: Pt will improve LB dressing/bathing to min A with use of AE PRN  GOAL 3: Pt will improve all functional transfers to min A from standard surface height  GOAL 4: Pt will improve grooming/hygiene tasks to [address] 5: Pt will improve self feeding task to modified independent when medically appropriate  GOAL 6: Pt will demo G- activity tolerance/endurance during 15-20 min ADL task   GOAL 7: Pt will demo G- tolerance for PROM and AAROM in all planes to improve BUE strength/functioning for ADL tasks  GOAL 8: Pt will demo G- dynamic sitting balance EOB during ADL tasks  GOAL 9: Pt will correctly squeeze hand on the letter A during the sequence CASABLANCA 0-1 errors. Functional Assessment:   Initial Status 18 Current status  Treatment    Feeding  Dependent  NGT  NGT NGT   Grooming  Max A  Attempted oral hygiene with [place] and mouthwash at EOB; [place] A for task completion  Max A;     [place] to wash face while pt sitting on the EOB with Min A to increase sitting balance.     Min A; To wash face while in sitting position.    Upper Body Dressing Max A

## 2018-07-06 NOTE — PROGRESS NOTES
Patient seen for dysphagia tx this AM; he was oriented to person only. He completed tongue-base strengthening and laryngeal elevation exercises X10 given max verbal/visual cues. Poor response to tx; decreased phonation and breathiness noted during laryngeal exercises.     Wayne Thacker Speech Therapy Student  7/6/2018

## 2018-07-06 NOTE — PROGRESS NOTES
Physical Therapy  Facility/Department: [address]  Daily Treatment Note  NAME: Jamison Harris  : 1970  MRN: [id number]    Date of Service: 2018   Evaluating Therapist: Yeny Priest PT, DPT     ROOM #: [id number]  DIAGNOSIS: Trauma  PRECAUTIONS: Falls, 6L O2, NG tube, C-collar  - all imaging is negative, L ICH  PROCEDURES:  extubated     Social:  Pt lives with parents in a 1 floor plan with 2 step(s) and 1 rail(s) to enter. Prior to admission pt walked with no device and was Independent.  Per pt's mother in chart, pt works odd jobs intermittently.       Initial Evaluation  Date: 18 Treatment  Date:   18  Short Term/ Long Term   Goals   AM-PAC 6 Clicks 61/72       Does pt have pain? No c/o pain No c/o pain      Bed Mobility  Rolling: SBA  Supine to sit: ModA  Sit to supine: MaxA  Scooting: ModA Rolling: NT  Supine to sit: SBA  Sit to supine: SBA  Scooting: SBA to EOB SBA   Transfers Sit to stand: MaxA  Stand to sit: MaxA  Stand pivot: NT Sit to stand: min A   Stand to sit: Min A   Stand pivot: Min A  [place] with AAD   Ambulation   2 feet side stepping to [address] with MaxA with no device 60 feet with ww Min A >75 feet with [place] with AAD   Stair negotiation: ascended and descended NT  NT >4 steps with 1 rail with [place]   BLE ROM WNL  WNL     BLE strength Grossly 3+ to 4-/5 NT Increase by 1/3 MMT grade   Balance Sitting: ModA dynamic  Standing: MaxA no device Seated: SBA  Standing dynamic: Min A with no AD Sitting: SBA  Standing: [place] with AAD       Alertness/Orientation: xself, place (hospital) unaware of month, reported year as . Pt performed the following therapeutic activities/exercise:   Seated ankle pumps, LAQ 2 x 10 reps, encouraged pt to count reps aloud. Comments: Pt was supine upon PT arrival and agreeable to PT treatment. Pt appeared lethargic initially improving toward end of session. less impulsive than in previous sessions. Able to follow 75% of commands.  Narrow AUGUSTINE noted with cues to. Pt also required cues and assist for ww approximation and safety. Pt asking for water several times throughout treatment, remains NPO with NG tude. Pt did not pull at lines during treatment, however occasionally distracted by NG tube. Patient education  Pt was educated on see above     Patient response to education:   Pt verbalized understanding Pt demonstrated skill Pt requires further education in this area   Yes  partial Yes        Time in: 1010  Time out: 1020    Pt is making progress toward established Physical Therapy goals. Continue with physical therapy current plan of care.     Emilee Han PTA [id number]

## 2018-07-06 NOTE — PLAN OF CARE
Problem: Restraint Use - Nonviolent/Non-Self-Destructive Behavior:  Goal: Absence of restraint indications  Absence of restraint indications   Outcome: Met This Shift    Goal: Absence of restraint-related injury  Absence of restraint-related injury   Outcome: Met This Shift

## 2018-07-07 ENCOUNTER — APPOINTMENT (OUTPATIENT)
Dept: GENERAL RADIOLOGY | Age: 48
DRG: 055 | End: 2018-07-07
Payer: COMMERCIAL

## 2018-07-07 LAB
ANION GAP SERPL CALCULATED.3IONS-SCNC: 7 MMOL/L (ref 7–16)
BASOPHILS ABSOLUTE: 0.04 E9/L (ref 0–0.2)
BASOPHILS RELATIVE PERCENT: 0.3 % (ref 0–2)
BUN BLDV-MCNC: 37 MG/DL (ref 6–20)
CALCIUM SERPL-MCNC: 9.4 MG/DL (ref 8.6–10.2)
CHLORIDE BLD-SCNC: 104 MMOL/L (ref 98–107)
CO2: 37 MMOL/L (ref 22–29)
CREAT SERPL-MCNC: 1 MG/DL (ref 0.7–1.2)
EOSINOPHILS ABSOLUTE: 0.15 E9/L (ref 0.05–0.5)
EOSINOPHILS RELATIVE PERCENT: 1.2 % (ref 0–6)
GFR AFRICAN AMERICAN: >60
GFR NON-AFRICAN AMERICAN: >60 ML/MIN/1.73
GLUCOSE BLD-MCNC: 65 MG/DL (ref 74–109)
HCT VFR BLD CALC: 37.7 % (ref 37–54)
HEMOGLOBIN: 11.8 G/DL (ref 12.5–16.5)
IMMATURE GRANULOCYTES #: 0.37 E9/L
IMMATURE GRANULOCYTES %: 3 % (ref 0–5)
LYMPHOCYTES ABSOLUTE: 3.46 E9/L (ref 1.5–4)
LYMPHOCYTES RELATIVE PERCENT: 28.1 % (ref 20–42)
MCH RBC QN AUTO: 27.2 PG (ref 26–35)
MCHC RBC AUTO-ENTMCNC: 31.3 % (ref 32–34.5)
MCV RBC AUTO: 86.9 FL (ref 80–99.9)
METER GLUCOSE: 111 MG/DL (ref 70–110)
METER GLUCOSE: 132 MG/DL (ref 70–110)
METER GLUCOSE: 155 MG/DL (ref 70–110)
METER GLUCOSE: 206 MG/DL (ref 70–110)
METER GLUCOSE: 76 MG/DL (ref 70–110)
METER GLUCOSE: 96 MG/DL (ref 70–110)
MONOCYTES ABSOLUTE: 1.41 E9/L (ref 0.1–0.95)
MONOCYTES RELATIVE PERCENT: 11.5 % (ref 2–12)
NEUTROPHILS ABSOLUTE: 6.88 E9/L (ref 1.8–7.3)
NEUTROPHILS RELATIVE PERCENT: 55.9 % (ref 43–80)
PDW BLD-RTO: 13.6 FL (ref 11.5–15)
PLATELET # BLD: 241 E9/L (ref 130–450)
PMV BLD AUTO: 10.3 FL (ref 7–12)
POTASSIUM SERPL-SCNC: 3.6 MMOL/L (ref 3.5–5)
RBC # BLD: 4.34 E12/L (ref 3.8–5.8)
SODIUM BLD-SCNC: 148 MMOL/L (ref 132–146)
WBC # BLD: 12.3 E9/L (ref 4.5–11.5)

## 2018-07-07 PROCEDURE — 6370000000 HC RX 637 (ALT 250 FOR IP): Performed by: STUDENT IN AN ORGANIZED HEALTH CARE EDUCATION/TRAINING PROGRAM

## 2018-07-07 PROCEDURE — 85025 COMPLETE CBC W/AUTO DIFF WBC: CPT

## 2018-07-07 PROCEDURE — 36415 COLL VENOUS BLD VENIPUNCTURE: CPT

## 2018-07-07 PROCEDURE — 6360000002 HC RX W HCPCS: Performed by: STUDENT IN AN ORGANIZED HEALTH CARE EDUCATION/TRAINING PROGRAM

## 2018-07-07 PROCEDURE — 80048 BASIC METABOLIC PNL TOTAL CA: CPT

## 2018-07-07 PROCEDURE — 1200000000 HC SEMI PRIVATE

## 2018-07-07 PROCEDURE — 2580000003 HC RX 258: Performed by: STUDENT IN AN ORGANIZED HEALTH CARE EDUCATION/TRAINING PROGRAM

## 2018-07-07 PROCEDURE — 74230 X-RAY XM SWLNG FUNCJ C+: CPT

## 2018-07-07 PROCEDURE — 6370000000 HC RX 637 (ALT 250 FOR IP): Performed by: SURGERY

## 2018-07-07 PROCEDURE — 92611 MOTION FLUOROSCOPY/SWALLOW: CPT

## 2018-07-07 PROCEDURE — 6370000000 HC RX 637 (ALT 250 FOR IP)

## 2018-07-07 PROCEDURE — 2500000003 HC RX 250 WO HCPCS: Performed by: STUDENT IN AN ORGANIZED HEALTH CARE EDUCATION/TRAINING PROGRAM

## 2018-07-07 PROCEDURE — 6370000000 HC RX 637 (ALT 250 FOR IP): Performed by: NURSE PRACTITIONER

## 2018-07-07 PROCEDURE — 99232 SBSQ HOSP IP/OBS MODERATE 35: CPT | Performed by: SURGERY

## 2018-07-07 PROCEDURE — 82962 GLUCOSE BLOOD TEST: CPT

## 2018-07-07 PROCEDURE — 2500000003 HC RX 250 WO HCPCS: Performed by: SURGERY

## 2018-07-07 PROCEDURE — 97530 THERAPEUTIC ACTIVITIES: CPT

## 2018-07-07 RX ORDER — DOCUSATE SODIUM 100 MG/1
CAPSULE, LIQUID FILLED ORAL
Status: COMPLETED
Start: 2018-07-07 | End: 2018-07-07

## 2018-07-07 RX ADMIN — DOCUSATE SODIUM 100 MG: 50 LIQUID ORAL at 21:07

## 2018-07-07 RX ADMIN — SODIUM CHLORIDE, PRESERVATIVE FREE 300 UNITS: 5 INJECTION INTRAVENOUS at 05:42

## 2018-07-07 RX ADMIN — RISPERIDONE 3 MG: 2 TABLET, FILM COATED ORAL at 12:57

## 2018-07-07 RX ADMIN — PROPRANOLOL HYDROCHLORIDE 20 MG: 20 TABLET ORAL at 12:58

## 2018-07-07 RX ADMIN — Medication 10 ML: at 21:08

## 2018-07-07 RX ADMIN — ENOXAPARIN SODIUM 30 MG: 30 INJECTION SUBCUTANEOUS at 09:07

## 2018-07-07 RX ADMIN — SODIUM CHLORIDE, PRESERVATIVE FREE 300 UNITS: 5 INJECTION INTRAVENOUS at 09:07

## 2018-07-07 RX ADMIN — Medication 10 ML: at 05:42

## 2018-07-07 RX ADMIN — BARIUM SULFATE 58 G: 960 POWDER, FOR SUSPENSION ORAL at 10:53

## 2018-07-07 RX ADMIN — PROPRANOLOL HYDROCHLORIDE 20 MG: 20 TABLET ORAL at 21:07

## 2018-07-07 RX ADMIN — POTASSIUM CHLORIDE 40 MEQ: 20 SOLUTION ORAL at 21:07

## 2018-07-07 RX ADMIN — BARIUM SULFATE 45 G: 0.6 CREAM ORAL at 10:53

## 2018-07-07 RX ADMIN — INSULIN LISPRO 6 UNITS: 100 INJECTION, SOLUTION INTRAVENOUS; SUBCUTANEOUS at 13:00

## 2018-07-07 RX ADMIN — SODIUM CHLORIDE, PRESERVATIVE FREE 300 UNITS: 5 INJECTION INTRAVENOUS at 21:08

## 2018-07-07 RX ADMIN — Medication 10 ML: at 09:09

## 2018-07-07 RX ADMIN — POTASSIUM CHLORIDE 40 MEQ: 20 SOLUTION ORAL at 12:59

## 2018-07-07 RX ADMIN — INSULIN LISPRO 3 UNITS: 100 INJECTION, SOLUTION INTRAVENOUS; SUBCUTANEOUS at 21:13

## 2018-07-07 RX ADMIN — DOCUSATE SODIUM 100 MG: 100 CAPSULE, LIQUID FILLED ORAL at 12:56

## 2018-07-07 RX ADMIN — Medication 10 ML: at 05:41

## 2018-07-07 RX ADMIN — INSULIN GLARGINE 40 UNITS: 100 INJECTION, SOLUTION SUBCUTANEOUS at 21:14

## 2018-07-07 RX ADMIN — SODIUM CHLORIDE TAB 1 GM 1 G: 1 TAB at 21:08

## 2018-07-07 RX ADMIN — ENOXAPARIN SODIUM 30 MG: 30 INJECTION SUBCUTANEOUS at 21:06

## 2018-07-07 RX ADMIN — SODIUM CHLORIDE TAB 1 GM 1 G: 1 TAB at 12:57

## 2018-07-07 RX ADMIN — DOCUSATE SODIUM 100 MG: 50 LIQUID ORAL at 12:56

## 2018-07-07 RX ADMIN — RISPERIDONE 3 MG: 2 TABLET, FILM COATED ORAL at 21:07

## 2018-07-07 NOTE — PROGRESS NOTES
[]only with use of a straw). [] Laryngeal penetration was noted AFTER the swallow for ([]thin []nectar []honey[] pureed []solid)          due to: []residuals in laryngeal vestibule       []pharyngeal residual       []redirection of bolus from the esophagus        which  []cleared from the laryngeal vestibule spontaneously  (transient)     []cleared from the laryngeal vestibule with a cued, re-directive throat clear       []remained in the laryngeal vestibule. []penetrated deep into the laryngeal vestibule (to the level of the true vocal folds)      Laryngeal penetration was  ([]trace []mild []moderate []marked []severe ) and      occurred ([]inconsistently  []consistently []only with use of a straw).         In response to laryngeal penetration,  []A  spontaneous cough/throat clear [] an inconsistent  [] a delayed cough       []an absent cough/throat clear was noted     ASPIRATION    [x]Aspiration was not present during this evaluation      []Aspiration BEFORE the swallow was present for ([]thin[] nectar[] honey []pureed []solid)       due to: ([] decreased bolus formation []premature pharyngeal entry []delayed pharyngeal swallow)       []Aspiration DURING the swallow was present for  ([]thin []nectar []honey[] pureed []solid)      due to: ([]delayed laryngeal closure []inadequate laryngeal closure)       []Aspiration AFTER  the swallow was present for ([]thin[] nectar []honey []pureed []solid)      due to:  ([]residuals in laryngeal vestibule []pharyngeal residual []redirection of bolus from the esophagus)      In response to aspiration,  []A  spontaneous cough/throat clear [] an inconsistent/delayed cough      []an absent cough/throat clear was noted     COMPENSATORY STRATEGIES    [] Compensatory strategies that were beneficial included: []chin tuck []double swallow []multiple swallow []alternating solids/liquids          []cued redirective cough []cued throat clear       [] Compensatory strategies that were not beneficial included: []chin tuck []double swallow []multiple swallow []alternating solids/liquids          []cued redirective cough []cued throat clear       [x] Compensatory strategies were not attempted. STRUCTURAL/FUNCTIONAL ANOMALIES    [x]No structural/functional anomalies were noted      []Inadequate velopharyngeal closure resulting in nasopharyngeal reflux. [] There was presence of Zenkers Diverticulum per Radiologist        []Comments:       CERVICAL ESOPHAGEAL STAGE : [x]Adequate []Inadequate  []Not Assessed     []Cervical osteophytes present per Radiologist   []Structural/mechanical abnormality in cervical esophagus per Radiologist  [] Redirection of bolus from the esophagus into pharynx                                []  Prognosis for improvements is   []  This plan will be re-evaluated and revised in 1 week  if warranted. []  Patient stated goals:   []  Treatment goals discussed with [] patient/  [] family. [x]  The [x]  patient/ [x]  family understand the diagnosis, prognosis and plan of care. [x]The admitting diagnosis and active problem list, as listed below have been reviewed prior to initiation of this evaluation.      ADMITTING DIAGNOSIS: Trauma [T14.90XA]     ACTIVE PROBLEM LIST:   Patient Active Problem List   Diagnosis    Trauma    [address]arachnoid hemorrhage ([address]    Closed fracture of temporal bone ([address]    Pneumonia of left lower lobe due to methicillin susceptible Staphylococcus aureus (MSSA) ([address]    Fall from ground level    Acute blood loss anemia    Insulin dependent diabetes mellitus ([address]    Acute respiratory failure following trauma and surgery ([address]

## 2018-07-07 NOTE — PROGRESS NOTES
Upon entering room, nurse found Renetta Bracket still down his left nare but the end of it sticking out his mouth, Dr. Liz Tripp notified via [facility] and stated to remove it and they will notify IV team to put another one in

## 2018-07-07 NOTE — PROGRESS NOTES
Subarachnoid hemorrhage (HCC)  Closed fracture of temporal bone (HCC)  There is no change in patient's neurosurgical status. Apparently passed swallowing test.  C/O hearing difficulty. Spoke with family.  Answered questions  Awaiting rehab placement  Will continue to follow along.

## 2018-07-07 NOTE — PLAN OF CARE
Problem: Falls - Risk of:  Goal: Will remain free from falls  Will remain free from falls   Outcome: Met This Shift    Goal: Absence of physical injury  Absence of physical injury   Outcome: Met This Shift      Problem: Risk for Impaired Skin Integrity  Goal: Tissue integrity - skin and mucous membranes  Structural intactness and normal physiological function of skin and  mucous membranes.    Outcome: Met This Shift      Problem: Restraint Use - Nonviolent/Non-Self-Destructive Behavior:  Goal: Absence of restraint indications  Absence of restraint indications   Outcome: Completed Date Met: 07/06/18    Goal: Absence of restraint-related injury  Absence of restraint-related injury   Outcome: Completed Date Met: 07/06/18

## 2018-07-08 LAB
ANION GAP SERPL CALCULATED.3IONS-SCNC: 9 MMOL/L (ref 7–16)
BASOPHILS ABSOLUTE: 0.06 E9/L (ref 0–0.2)
BASOPHILS RELATIVE PERCENT: 0.6 % (ref 0–2)
BUN BLDV-MCNC: 26 MG/DL (ref 6–20)
CALCIUM SERPL-MCNC: 9 MG/DL (ref 8.6–10.2)
CHLORIDE BLD-SCNC: 99 MMOL/L (ref 98–107)
CO2: 34 MMOL/L (ref 22–29)
CREAT SERPL-MCNC: 1 MG/DL (ref 0.7–1.2)
EOSINOPHILS ABSOLUTE: 0.31 E9/L (ref 0.05–0.5)
EOSINOPHILS RELATIVE PERCENT: 3.1 % (ref 0–6)
GFR AFRICAN AMERICAN: >60
GFR NON-AFRICAN AMERICAN: >60 ML/MIN/1.73
GLUCOSE BLD-MCNC: 66 MG/DL (ref 74–109)
HCT VFR BLD CALC: 37 % (ref 37–54)
HEMOGLOBIN: 12.2 G/DL (ref 12.5–16.5)
IMMATURE GRANULOCYTES #: 0.47 E9/L
IMMATURE GRANULOCYTES %: 4.7 % (ref 0–5)
LYMPHOCYTES ABSOLUTE: 3.65 E9/L (ref 1.5–4)
LYMPHOCYTES RELATIVE PERCENT: 36.5 % (ref 20–42)
MCH RBC QN AUTO: 27.7 PG (ref 26–35)
MCHC RBC AUTO-ENTMCNC: 33 % (ref 32–34.5)
MCV RBC AUTO: 84.1 FL (ref 80–99.9)
METER GLUCOSE: 178 MG/DL (ref 70–110)
METER GLUCOSE: 330 MG/DL (ref 70–110)
METER GLUCOSE: 67 MG/DL (ref 70–110)
METER GLUCOSE: 70 MG/DL (ref 70–110)
METER GLUCOSE: 71 MG/DL (ref 70–110)
METER GLUCOSE: 85 MG/DL (ref 70–110)
MONOCYTES ABSOLUTE: 0.94 E9/L (ref 0.1–0.95)
MONOCYTES RELATIVE PERCENT: 9.4 % (ref 2–12)
NEUTROPHILS ABSOLUTE: 4.57 E9/L (ref 1.8–7.3)
NEUTROPHILS RELATIVE PERCENT: 45.7 % (ref 43–80)
PDW BLD-RTO: 13.6 FL (ref 11.5–15)
PLATELET # BLD: 226 E9/L (ref 130–450)
PMV BLD AUTO: 10 FL (ref 7–12)
POTASSIUM SERPL-SCNC: 3.5 MMOL/L (ref 3.5–5)
RBC # BLD: 4.4 E12/L (ref 3.8–5.8)
SODIUM BLD-SCNC: 142 MMOL/L (ref 132–146)
WBC # BLD: 10 E9/L (ref 4.5–11.5)

## 2018-07-08 PROCEDURE — 85025 COMPLETE CBC W/AUTO DIFF WBC: CPT

## 2018-07-08 PROCEDURE — 2580000003 HC RX 258: Performed by: STUDENT IN AN ORGANIZED HEALTH CARE EDUCATION/TRAINING PROGRAM

## 2018-07-08 PROCEDURE — 99232 SBSQ HOSP IP/OBS MODERATE 35: CPT | Performed by: SURGERY

## 2018-07-08 PROCEDURE — 6360000002 HC RX W HCPCS: Performed by: STUDENT IN AN ORGANIZED HEALTH CARE EDUCATION/TRAINING PROGRAM

## 2018-07-08 PROCEDURE — 36415 COLL VENOUS BLD VENIPUNCTURE: CPT

## 2018-07-08 PROCEDURE — 6370000000 HC RX 637 (ALT 250 FOR IP): Performed by: SURGERY

## 2018-07-08 PROCEDURE — 82962 GLUCOSE BLOOD TEST: CPT

## 2018-07-08 PROCEDURE — 6370000000 HC RX 637 (ALT 250 FOR IP): Performed by: STUDENT IN AN ORGANIZED HEALTH CARE EDUCATION/TRAINING PROGRAM

## 2018-07-08 PROCEDURE — 6370000000 HC RX 637 (ALT 250 FOR IP): Performed by: NURSE PRACTITIONER

## 2018-07-08 PROCEDURE — 1200000000 HC SEMI PRIVATE

## 2018-07-08 PROCEDURE — 80048 BASIC METABOLIC PNL TOTAL CA: CPT

## 2018-07-08 RX ADMIN — Medication 10 ML: at 21:56

## 2018-07-08 RX ADMIN — PROPRANOLOL HYDROCHLORIDE 20 MG: 20 TABLET ORAL at 21:55

## 2018-07-08 RX ADMIN — POTASSIUM CHLORIDE 40 MEQ: 20 SOLUTION ORAL at 08:26

## 2018-07-08 RX ADMIN — RISPERIDONE 3 MG: 2 TABLET, FILM COATED ORAL at 21:55

## 2018-07-08 RX ADMIN — SODIUM CHLORIDE TAB 1 GM 1 G: 1 TAB at 14:59

## 2018-07-08 RX ADMIN — INSULIN GLARGINE 40 UNITS: 100 INJECTION, SOLUTION SUBCUTANEOUS at 08:21

## 2018-07-08 RX ADMIN — SODIUM CHLORIDE, PRESERVATIVE FREE 300 UNITS: 5 INJECTION INTRAVENOUS at 21:59

## 2018-07-08 RX ADMIN — ENOXAPARIN SODIUM 30 MG: 30 INJECTION SUBCUTANEOUS at 21:55

## 2018-07-08 RX ADMIN — SODIUM CHLORIDE TAB 1 GM 1 G: 1 TAB at 08:26

## 2018-07-08 RX ADMIN — Medication 10 ML: at 06:52

## 2018-07-08 RX ADMIN — SODIUM CHLORIDE, PRESERVATIVE FREE 300 UNITS: 5 INJECTION INTRAVENOUS at 08:25

## 2018-07-08 RX ADMIN — INSULIN LISPRO 12 UNITS: 100 INJECTION, SOLUTION INTRAVENOUS; SUBCUTANEOUS at 12:14

## 2018-07-08 RX ADMIN — RISPERIDONE 3 MG: 2 TABLET, FILM COATED ORAL at 08:26

## 2018-07-08 RX ADMIN — PROPRANOLOL HYDROCHLORIDE 20 MG: 20 TABLET ORAL at 08:26

## 2018-07-08 RX ADMIN — SODIUM CHLORIDE TAB 1 GM 1 G: 1 TAB at 21:55

## 2018-07-08 RX ADMIN — Medication 10 ML: at 08:25

## 2018-07-08 RX ADMIN — PANTOPRAZOLE SODIUM 40 MG: 40 GRANULE, DELAYED RELEASE ORAL at 06:50

## 2018-07-08 RX ADMIN — DOCUSATE SODIUM 100 MG: 50 LIQUID ORAL at 08:26

## 2018-07-08 RX ADMIN — Medication 10 ML: at 06:51

## 2018-07-08 RX ADMIN — PROPRANOLOL HYDROCHLORIDE 20 MG: 20 TABLET ORAL at 14:59

## 2018-07-08 RX ADMIN — ENOXAPARIN SODIUM 30 MG: 30 INJECTION SUBCUTANEOUS at 08:25

## 2018-07-08 RX ADMIN — SODIUM CHLORIDE, PRESERVATIVE FREE 300 UNITS: 5 INJECTION INTRAVENOUS at 06:51

## 2018-07-08 ASSESSMENT — PAIN SCALES - GENERAL
PAINLEVEL_OUTOF10: 0
PAINLEVEL_OUTOF10: 0

## 2018-07-08 NOTE — PROGRESS NOTES
[facility]  ATTENDING PHYSICIAN PROGRESS NOTE     I have examined the patient, reviewed the record, and discussed the case with the APN/  Resident. I have reviewed all relevant labs and imaging data. Please refer to the  APN/ resident's note. I agree with the  assessment and plan with the following corrections/ additions. The following summarizes my clinical findings and independent assessment. CC: traumatic brain injury    S. Pt passed his video swallow    O. Awake and alert to self/ place  GCS 14  s1s2  Lungs coarse  Abdomen soft nt nd    ASSESSMENT:  Principal Problem:    Trauma  Active Problems:    Subarachnoid hemorrhage (HCC)    Closed fracture of temporal bone (HCC)    Pneumonia of left lower lobe due to methicillin susceptible Staphylococcus aureus (MSSA) ([facility]    Fall from ground level    Acute blood loss anemia    Insulin dependent diabetes mellitus ([facility]    Acute respiratory failure following trauma and surgery ([facility]  Resolved Problems:    * No resolved hospital problems.  *       PLAN:  Traumatic brain injury  SAH  Brainstorming--on propranol and risperdal    Moderate calorie protein malnutrition--encourage regular diet    Diabetes--on lantus/ SSI    PT/OT  PMR eval pending  DVT Proph: SCDS/ lovenox       Charity Dow MD, FACS  7/8/2018  11:03 AM

## 2018-07-08 NOTE — PROGRESS NOTES
Trauma Daily Progress Note  Date of admission:  6/16/2018    Mechanism of injury: [facility]    Patient Active Problem List   Diagnosis    Trauma    [facility]    Closed fracture of temporal bone ([facility]    Pneumonia of left lower lobe due to methicillin susceptible Staphylococcus aureus (MSSA) ([facility]    Fall from ground level    Acute blood loss anemia    Insulin dependent diabetes mellitus (HCC)    Acute respiratory failure following trauma and surgery ([facility])       Subjective:  No acute events  Pain controlled  Tolerating diet  Denied n/v    Physical Exam:  /81   Pulse 89   Temp 96.7 °F (35.9 °C) (Temporal)   Resp 16   Ht 6' 2\" (1.88 m)   Wt 172 lb 9.6 oz (78.3 kg)   SpO2 97%   BMI 22.16 kg/m²     General: NAD, awake and alert. Head: Normocephalic, atraumatic  Eyes: PERRLA, EOMI. Lungs: No increased work of breathing. CTAB. No W/R/R. Cardiovascular: RRR. Abdomen: Soft, ND, NT. No rebound, guarding or rigidity. Extremities: Atraumatic, full range of motion  Skin: Warm, dry and intact        ASSESSMENT / PLAN:  Neuro: Found down. A fall from standing height. Traumatic brain injury. Temporal bone fracture. Hx of substance abuse - Monitor neuro status. Neurosurgery following. Risperdal.    CV:  Sinus tachycardia - - Monitor hemodynamics. Inderal.  Pulm: Respiratory insufficiency. Tracheitis- Monitor RR & SpO2. O2 as needed. Encourage cough, SMI  & deep breathing. GI: Diet as tolerated. Bowel regimen. Renal:  Monitor UOP. ID: Tracheitis. Completed Cefepime   Endocrine: Hyperglycemia. HX of diabetes  - Monitor BS.  ISS. Lantus. MSK: Deconditioned  - ROM. Turn & reposition. PT & OT AM PAC score 10/24. PMR following. Monitor for skin breakdown. Heme: Acute blood loss anemia - Monitor CBC.       Bowel regime: Colace. Senna. MOM. Ducolax suppository. Pain control/Sedation:  Oxycodone. Tylenol. DVT prophylaxis: SCDs. Lovenox. GI prophylaxis: .  TF.

## 2018-07-08 NOTE — PROGRESS NOTES
Subarachnoid hemorrhage (HCC)  Closed fracture of temporal bone (HCC)  There is no change in patient's neurosurgical status. Apparently passed swallowing test & is more oriented  Awaiting rehab placement.  Improving  Will continue to follow along.

## 2018-07-09 PROBLEM — S16.1XXA NECK STRAIN: Status: ACTIVE | Noted: 2018-07-09

## 2018-07-09 LAB
ANION GAP SERPL CALCULATED.3IONS-SCNC: 10 MMOL/L (ref 7–16)
BASOPHILS ABSOLUTE: 0.04 E9/L (ref 0–0.2)
BASOPHILS RELATIVE PERCENT: 0.4 % (ref 0–2)
BUN BLDV-MCNC: 22 MG/DL (ref 6–20)
CALCIUM SERPL-MCNC: 8.9 MG/DL (ref 8.6–10.2)
CHLORIDE BLD-SCNC: 98 MMOL/L (ref 98–107)
CO2: 32 MMOL/L (ref 22–29)
CREAT SERPL-MCNC: 1 MG/DL (ref 0.7–1.2)
EOSINOPHILS ABSOLUTE: 0.38 E9/L (ref 0.05–0.5)
EOSINOPHILS RELATIVE PERCENT: 3.8 % (ref 0–6)
GFR AFRICAN AMERICAN: >60
GFR NON-AFRICAN AMERICAN: >60 ML/MIN/1.73
GLUCOSE BLD-MCNC: 99 MG/DL (ref 74–109)
HCT VFR BLD CALC: 36.5 % (ref 37–54)
HEMOGLOBIN: 11.9 G/DL (ref 12.5–16.5)
IMMATURE GRANULOCYTES #: 0.38 E9/L
IMMATURE GRANULOCYTES %: 3.8 % (ref 0–5)
LYMPHOCYTES ABSOLUTE: 3.63 E9/L (ref 1.5–4)
LYMPHOCYTES RELATIVE PERCENT: 36.7 % (ref 20–42)
MCH RBC QN AUTO: 27.5 PG (ref 26–35)
MCHC RBC AUTO-ENTMCNC: 32.6 % (ref 32–34.5)
MCV RBC AUTO: 84.3 FL (ref 80–99.9)
METER GLUCOSE: 224 MG/DL (ref 70–110)
METER GLUCOSE: 246 MG/DL (ref 70–110)
METER GLUCOSE: 274 MG/DL (ref 70–110)
MONOCYTES ABSOLUTE: 0.95 E9/L (ref 0.1–0.95)
MONOCYTES RELATIVE PERCENT: 9.6 % (ref 2–12)
NEUTROPHILS ABSOLUTE: 4.52 E9/L (ref 1.8–7.3)
NEUTROPHILS RELATIVE PERCENT: 45.7 % (ref 43–80)
PDW BLD-RTO: 13.5 FL (ref 11.5–15)
PLATELET # BLD: 201 E9/L (ref 130–450)
PMV BLD AUTO: 10.1 FL (ref 7–12)
POTASSIUM SERPL-SCNC: 3.6 MMOL/L (ref 3.5–5)
RBC # BLD: 4.33 E12/L (ref 3.8–5.8)
SODIUM BLD-SCNC: 140 MMOL/L (ref 132–146)
WBC # BLD: 9.9 E9/L (ref 4.5–11.5)

## 2018-07-09 PROCEDURE — 82962 GLUCOSE BLOOD TEST: CPT

## 2018-07-09 PROCEDURE — 6370000000 HC RX 637 (ALT 250 FOR IP): Performed by: SURGERY

## 2018-07-09 PROCEDURE — 99231 SBSQ HOSP IP/OBS SF/LOW 25: CPT | Performed by: SURGERY

## 2018-07-09 PROCEDURE — 36415 COLL VENOUS BLD VENIPUNCTURE: CPT

## 2018-07-09 PROCEDURE — 6370000000 HC RX 637 (ALT 250 FOR IP): Performed by: STUDENT IN AN ORGANIZED HEALTH CARE EDUCATION/TRAINING PROGRAM

## 2018-07-09 PROCEDURE — 80048 BASIC METABOLIC PNL TOTAL CA: CPT

## 2018-07-09 PROCEDURE — 85025 COMPLETE CBC W/AUTO DIFF WBC: CPT

## 2018-07-09 PROCEDURE — 1200000000 HC SEMI PRIVATE

## 2018-07-09 PROCEDURE — 2580000003 HC RX 258: Performed by: STUDENT IN AN ORGANIZED HEALTH CARE EDUCATION/TRAINING PROGRAM

## 2018-07-09 PROCEDURE — 97530 THERAPEUTIC ACTIVITIES: CPT

## 2018-07-09 PROCEDURE — 97535 SELF CARE MNGMENT TRAINING: CPT

## 2018-07-09 PROCEDURE — 6370000000 HC RX 637 (ALT 250 FOR IP): Performed by: NURSE PRACTITIONER

## 2018-07-09 PROCEDURE — 6360000002 HC RX W HCPCS: Performed by: STUDENT IN AN ORGANIZED HEALTH CARE EDUCATION/TRAINING PROGRAM

## 2018-07-09 RX ORDER — POTASSIUM CHLORIDE 20 MEQ/1
40 TABLET, EXTENDED RELEASE ORAL 2 TIMES DAILY WITH MEALS
Status: DISCONTINUED | OUTPATIENT
Start: 2018-07-09 | End: 2018-07-10 | Stop reason: HOSPADM

## 2018-07-09 RX ORDER — PANTOPRAZOLE SODIUM 40 MG/1
40 TABLET, DELAYED RELEASE ORAL
Status: DISCONTINUED | OUTPATIENT
Start: 2018-07-09 | End: 2018-07-10 | Stop reason: HOSPADM

## 2018-07-09 RX ORDER — DOCUSATE SODIUM 100 MG/1
100 CAPSULE, LIQUID FILLED ORAL 2 TIMES DAILY
Status: DISCONTINUED | OUTPATIENT
Start: 2018-07-09 | End: 2018-07-10 | Stop reason: HOSPADM

## 2018-07-09 RX ADMIN — SODIUM CHLORIDE TAB 1 GM 1 G: 1 TAB at 17:40

## 2018-07-09 RX ADMIN — Medication 10 ML: at 06:05

## 2018-07-09 RX ADMIN — PROPRANOLOL HYDROCHLORIDE 20 MG: 20 TABLET ORAL at 10:04

## 2018-07-09 RX ADMIN — Medication 10 ML: at 20:47

## 2018-07-09 RX ADMIN — POTASSIUM CHLORIDE 40 MEQ: 20 TABLET, EXTENDED RELEASE ORAL at 16:55

## 2018-07-09 RX ADMIN — SODIUM CHLORIDE, PRESERVATIVE FREE 300 UNITS: 5 INJECTION INTRAVENOUS at 20:46

## 2018-07-09 RX ADMIN — INSULIN GLARGINE 40 UNITS: 100 INJECTION, SOLUTION SUBCUTANEOUS at 10:12

## 2018-07-09 RX ADMIN — PROPRANOLOL HYDROCHLORIDE 20 MG: 20 TABLET ORAL at 16:03

## 2018-07-09 RX ADMIN — INSULIN LISPRO 6 UNITS: 100 INJECTION, SOLUTION INTRAVENOUS; SUBCUTANEOUS at 16:56

## 2018-07-09 RX ADMIN — RISPERIDONE 3 MG: 2 TABLET, FILM COATED ORAL at 20:46

## 2018-07-09 RX ADMIN — SODIUM CHLORIDE TAB 1 GM 1 G: 1 TAB at 10:04

## 2018-07-09 RX ADMIN — INSULIN GLARGINE 40 UNITS: 100 INJECTION, SOLUTION SUBCUTANEOUS at 21:01

## 2018-07-09 RX ADMIN — PANTOPRAZOLE SODIUM 40 MG: 40 TABLET, DELAYED RELEASE ORAL at 06:05

## 2018-07-09 RX ADMIN — DOCUSATE SODIUM 100 MG: 100 CAPSULE, LIQUID FILLED ORAL at 20:46

## 2018-07-09 RX ADMIN — SODIUM CHLORIDE TAB 1 GM 1 G: 1 TAB at 20:46

## 2018-07-09 RX ADMIN — ENOXAPARIN SODIUM 30 MG: 30 INJECTION SUBCUTANEOUS at 20:47

## 2018-07-09 RX ADMIN — Medication 10 ML: at 10:08

## 2018-07-09 RX ADMIN — INSULIN LISPRO 9 UNITS: 100 INJECTION, SOLUTION INTRAVENOUS; SUBCUTANEOUS at 21:02

## 2018-07-09 RX ADMIN — INSULIN LISPRO 6 UNITS: 100 INJECTION, SOLUTION INTRAVENOUS; SUBCUTANEOUS at 13:44

## 2018-07-09 RX ADMIN — POTASSIUM CHLORIDE 40 MEQ: 20 TABLET, EXTENDED RELEASE ORAL at 10:04

## 2018-07-09 RX ADMIN — SODIUM CHLORIDE, PRESERVATIVE FREE 300 UNITS: 5 INJECTION INTRAVENOUS at 10:05

## 2018-07-09 RX ADMIN — DOCUSATE SODIUM 100 MG: 100 CAPSULE, LIQUID FILLED ORAL at 10:04

## 2018-07-09 RX ADMIN — PROPRANOLOL HYDROCHLORIDE 20 MG: 20 TABLET ORAL at 20:46

## 2018-07-09 RX ADMIN — RISPERIDONE 3 MG: 2 TABLET, FILM COATED ORAL at 10:04

## 2018-07-09 RX ADMIN — ENOXAPARIN SODIUM 30 MG: 30 INJECTION SUBCUTANEOUS at 10:04

## 2018-07-09 NOTE — CARE COORDINATION
Social Work/Discharge Planning:    Per Rehab note, Rehab recommending Home with [facility] with PT/OT and speech therapy. TALISHA met with pt mother Johnny Carson and discussed d/c planning. Pt mother agreeable to pt return home with [facility]. Pt mother provided [facility] choices of 1. [facility] 2. [facility]. SW discussed DME with pt mother, pt mother stated that pt would need a wheeled walker and shower chair at discharge. Pt mother stated that she would like  DME after choices. Pt mother stated that she, pt father and pt sister will assist pt at home. Pt mother stated that she will provide transportation home for pt. SW made referral to [facility], [facility]. AND rep MAKENNA at [facility] stated that they do not provide Speech therapy at this time. SW placed a call to [address],  at [address] [facility] stated that they will contact on call rep to follow up with SW. TALISHA notified pt mother of this and discussed additional choices for [facility]. Pt mother stated that she would like [facility]. TALISHA made referral to Connie at [facility]. TALISHA placed a call to [facility] [phone] in regards to pt DME orders in pt chart, No answer at this time, TALISHA left message. Will need to call  DME at [phone] for delivery of pt DME. Awaiting Decision of [facility], TALISHA will follow.     Electronically signed by DENA Christian on 7/9/2018 at 4:39 PM

## 2018-07-09 NOTE — PROGRESS NOTES
Discussed with Dr. Shmuel Pineda , Patient is at a high functional level and does not meet payor source criteria for ARU. Will rec: home with parents and [facility] PT, OT and speech. Social work, Triny Dukes and patient nurse, Shahab Soler notified.

## 2018-07-09 NOTE — CARE COORDINATION
Social Work/Discharge Planning:      Pt DME of shower chair and wheeled walker was placed in pt room by [facility]. TALISHA spoke with Connie at [facility]. Connie stated that pt is accepted at [facility] and [facility] is able to provide service for pt Wednesday 7/11. RN notified. Pt mother  Binta Weir notified and is agreeable to [place] [phone] providing service for pt on Wednesday 7/11. TALISHA will follow.     Electronically signed by DENA Rodriguez on 7/9/2018 at 5:08 PM

## 2018-07-10 VITALS
TEMPERATURE: 97.9 F | SYSTOLIC BLOOD PRESSURE: 121 MMHG | WEIGHT: 174 LBS | HEART RATE: 92 BPM | BODY MASS INDEX: 22.33 KG/M2 | OXYGEN SATURATION: 99 % | RESPIRATION RATE: 14 BRPM | HEIGHT: 74 IN | DIASTOLIC BLOOD PRESSURE: 80 MMHG

## 2018-07-10 LAB
ANION GAP SERPL CALCULATED.3IONS-SCNC: 16 MMOL/L (ref 7–16)
BASOPHILS ABSOLUTE: 0.03 E9/L (ref 0–0.2)
BASOPHILS RELATIVE PERCENT: 0.3 % (ref 0–2)
BUN BLDV-MCNC: 18 MG/DL (ref 6–20)
CALCIUM SERPL-MCNC: 8.8 MG/DL (ref 8.6–10.2)
CHLORIDE BLD-SCNC: 96 MMOL/L (ref 98–107)
CO2: 28 MMOL/L (ref 22–29)
CREAT SERPL-MCNC: 1 MG/DL (ref 0.7–1.2)
EOSINOPHILS ABSOLUTE: 0.36 E9/L (ref 0.05–0.5)
EOSINOPHILS RELATIVE PERCENT: 3.8 % (ref 0–6)
GFR AFRICAN AMERICAN: >60
GFR NON-AFRICAN AMERICAN: >60 ML/MIN/1.73
GLUCOSE BLD-MCNC: 112 MG/DL (ref 74–109)
HCT VFR BLD CALC: 37.3 % (ref 37–54)
HEMOGLOBIN: 11.4 G/DL (ref 12.5–16.5)
IMMATURE GRANULOCYTES #: 0.38 E9/L
IMMATURE GRANULOCYTES %: 4 % (ref 0–5)
LYMPHOCYTES ABSOLUTE: 3.1 E9/L (ref 1.5–4)
LYMPHOCYTES RELATIVE PERCENT: 32.5 % (ref 20–42)
MCH RBC QN AUTO: 27.2 PG (ref 26–35)
MCHC RBC AUTO-ENTMCNC: 30.6 % (ref 32–34.5)
MCV RBC AUTO: 89 FL (ref 80–99.9)
METER GLUCOSE: 118 MG/DL (ref 70–110)
METER GLUCOSE: 212 MG/DL (ref 70–110)
MONOCYTES ABSOLUTE: 0.99 E9/L (ref 0.1–0.95)
MONOCYTES RELATIVE PERCENT: 10.4 % (ref 2–12)
NEUTROPHILS ABSOLUTE: 4.68 E9/L (ref 1.8–7.3)
NEUTROPHILS RELATIVE PERCENT: 49 % (ref 43–80)
PDW BLD-RTO: 14.1 FL (ref 11.5–15)
PLATELET # BLD: 180 E9/L (ref 130–450)
PMV BLD AUTO: 11.2 FL (ref 7–12)
POTASSIUM SERPL-SCNC: 3.4 MMOL/L (ref 3.5–5)
RBC # BLD: 4.19 E12/L (ref 3.8–5.8)
SODIUM BLD-SCNC: 140 MMOL/L (ref 132–146)
WBC # BLD: 9.5 E9/L (ref 4.5–11.5)

## 2018-07-10 PROCEDURE — 6370000000 HC RX 637 (ALT 250 FOR IP): Performed by: SURGERY

## 2018-07-10 PROCEDURE — 6370000000 HC RX 637 (ALT 250 FOR IP): Performed by: STUDENT IN AN ORGANIZED HEALTH CARE EDUCATION/TRAINING PROGRAM

## 2018-07-10 PROCEDURE — 80048 BASIC METABOLIC PNL TOTAL CA: CPT

## 2018-07-10 PROCEDURE — 36415 COLL VENOUS BLD VENIPUNCTURE: CPT

## 2018-07-10 PROCEDURE — 2580000003 HC RX 258: Performed by: STUDENT IN AN ORGANIZED HEALTH CARE EDUCATION/TRAINING PROGRAM

## 2018-07-10 PROCEDURE — 6360000002 HC RX W HCPCS: Performed by: STUDENT IN AN ORGANIZED HEALTH CARE EDUCATION/TRAINING PROGRAM

## 2018-07-10 PROCEDURE — 99231 SBSQ HOSP IP/OBS SF/LOW 25: CPT | Performed by: SURGERY

## 2018-07-10 PROCEDURE — 85025 COMPLETE CBC W/AUTO DIFF WBC: CPT

## 2018-07-10 PROCEDURE — 97530 THERAPEUTIC ACTIVITIES: CPT

## 2018-07-10 PROCEDURE — 6370000000 HC RX 637 (ALT 250 FOR IP): Performed by: NURSE PRACTITIONER

## 2018-07-10 PROCEDURE — 82962 GLUCOSE BLOOD TEST: CPT

## 2018-07-10 RX ADMIN — SODIUM CHLORIDE, PRESERVATIVE FREE 300 UNITS: 5 INJECTION INTRAVENOUS at 10:42

## 2018-07-10 RX ADMIN — DOCUSATE SODIUM 100 MG: 100 CAPSULE, LIQUID FILLED ORAL at 10:41

## 2018-07-10 RX ADMIN — INSULIN GLARGINE 40 UNITS: 100 INJECTION, SOLUTION SUBCUTANEOUS at 10:47

## 2018-07-10 RX ADMIN — PANTOPRAZOLE SODIUM 40 MG: 40 TABLET, DELAYED RELEASE ORAL at 07:06

## 2018-07-10 RX ADMIN — POTASSIUM CHLORIDE 40 MEQ: 20 TABLET, EXTENDED RELEASE ORAL at 10:46

## 2018-07-10 RX ADMIN — RISPERIDONE 3 MG: 2 TABLET, FILM COATED ORAL at 10:41

## 2018-07-10 RX ADMIN — ENOXAPARIN SODIUM 30 MG: 30 INJECTION SUBCUTANEOUS at 10:42

## 2018-07-10 RX ADMIN — Medication 10 ML: at 10:42

## 2018-07-10 RX ADMIN — SODIUM CHLORIDE TAB 1 GM 1 G: 1 TAB at 14:05

## 2018-07-10 RX ADMIN — PROPRANOLOL HYDROCHLORIDE 20 MG: 20 TABLET ORAL at 14:05

## 2018-07-10 RX ADMIN — SODIUM CHLORIDE, PRESERVATIVE FREE 300 UNITS: 5 INJECTION INTRAVENOUS at 05:56

## 2018-07-10 RX ADMIN — Medication 10 ML: at 05:56

## 2018-07-10 RX ADMIN — PROPRANOLOL HYDROCHLORIDE 20 MG: 20 TABLET ORAL at 10:42

## 2018-07-10 RX ADMIN — INSULIN LISPRO 6 UNITS: 100 INJECTION, SOLUTION INTRAVENOUS; SUBCUTANEOUS at 12:47

## 2018-07-10 RX ADMIN — SODIUM CHLORIDE TAB 1 GM 1 G: 1 TAB at 10:46

## 2018-07-10 NOTE — PROGRESS NOTES
Nutrition Assessment    Type and Reason for Visit: Reassess    Nutrition Recommendations: Continue current diet, Start ONS (Glucerna BID d/t variable elevated glucose) Monitor need for CHO Control diet. Malnutrition Assessment:  · Malnutrition Status: No malnutrition  · Context: Acute illness or injury  · Findings of the 6 clinical characteristics of malnutrition (Minimum of 2 out of 6 clinical characteristics is required to make the diagnosis of moderate or severe Protein Calorie Malnutrition based on AND/ASPEN Guidelines):  1. Energy Intake-Less than or equal to 75%,  (x 4 days)    2. Weight Loss-No significant weight loss   3. Fat Loss-No significant subcutaneous fat loss  4. Muscle Loss-No significant muscle mass loss  5. Fluid Accumulation-No significant fluid accumulation  6.  Strength-Not measured    Nutrition Diagnosis:   · Problem: Inadequate oral intake  · Etiology: related to Insufficient energy/nutrient consumption     Signs and symptoms:  as evidenced by Intake 50-75%    Nutrition Assessment:  · Nutrition-Focused Physical Findings: pt alert, drowsy, dysphagia resolved, -I/O's 16L, no edema, active BS   · Wound Type: None     · Current Nutrition Therapies:  · Oral Diet Orders: General   · Oral Diet intake: 51-75% (doc flow average since PO diet advanced from TF )     · Anthropometric Measures:  · Ht: 6' 2\" (188 cm)   · Current Body Wt: 174 lb (78.9 kg) (7/10 bedscale)  · Admission Body Wt: 185 lb (83.9 kg) (first measured)  · Usual Body Wt:  (UTO)  · % Weight Change: noted wt fluctuations since admit, -I/O's 16L at this time.  Pt reports stable wt hx PTA  · Ideal Body Wt: 190 lb (86.2 kg), % Ideal Body 92%  · BMI Classification: BMI 18.5 - 24.9 Normal Weight     · Comparative Standards (Estimated Nutrition Needs):  · Estimated Daily Total Kcal: 7199-0199  · Estimated Daily Protein (g):     Estimated Intake vs Estimated Needs: Intake Less Than Needs, Intake Improving    Nutrition Risk

## 2018-07-10 NOTE — PATIENT CARE CONFERENCE
[facility] Quality Flow/Interdisciplinary Rounds Progress Note        Quality Flow Rounds held on July 10, 2018    Disciplines Attending:  Nursing Leadership, Bedside Nurse, , and Case Management. Raiza Pozo was admitted on 6/16/2018  1:22 AM    Anticipated Discharge Date:  Expected Discharge Date: 07/10/18    Disposition:    Cl Score:  Cl Scale Score: 18    Readmission Risk              Risk of Unplanned Readmission:        19             Discussed patient goal for the day, patient clinical progression, and barriers to discharge. The following Goal(s) of the Day/Commitment(s) have been identified:  Discharge home with home health.     Jonathan Pham  July 10, 2018

## 2018-07-10 NOTE — PROGRESS NOTES
Physical Therapy  Facility/Department: [facility] NEURO SPINE  Daily Treatment Note  NAME: Lakeshia Marques  : 1970  MRN: [id number]    Date of Service: 7/10/2018    Evaluating Therapist: Rickey Evans, PT, DPT     ROOM #: [id number]  DIAGNOSIS: Trauma  PRECAUTIONS: Falls, 6L O2, NG tube,  all imaging is negative, L ICH  PROCEDURES:  extubated     Social:  Pt lives with parents in a 1 floor plan with 2 step(s) and 1 rail(s) to enter. Prior to admission pt walked with no device and was Independent.  Per pt's mother in chart, pt works odd jobs intermittently.       Initial Evaluation  Date: 18 Treatment  Date:   7/10/18  Short Term/ Long Term   Goals   AM-PAC 6 Clicks 83/81       Does pt have pain? No c/o pain No c/o pain      Bed Mobility  Rolling: SBA  Supine to sit: ModA  Sit to supine: MaxA  Scooting: ModA supervision   Supervision      Transfers Sit to stand: MaxA  Stand to sit: MaxA  Stand pivot: NT sba   Supervision with AAD   Ambulation   2 feet side stepping to [facility] with MaxA with no device 150ft with sba ww   >200 feet with Supervision with AAD   Stair negotiation: ascended and descended NT  10 steps 1 rail sba >4 steps with 1 rail with [place]   BLE ROM WNL  WNL     BLE strength Grossly 3+ to 4-/5 NT Increase by 1/3 MMT grade   Balance Sitting: ModA dynamic  Standing: MaxA no device Seated: SBA  Standing dynamic: Min A with ww Sitting: SBA  Standing: Supervision with AAD         Additional Comments: pt supine upon entering room. Pt c/o dizziness upon sitting at EOB. Pt demo slow gait but steady;intermintent brief standing rest breaks d/t dizziness. Pt had mild LOB with R LE buckling on top step but self corrected. Returned pt to bedside chair with all needs met.     Pt call light left by patient.     Time in: 0730  Time out: 0800     Pt is making good progress toward established Physical Therapy goals.   Continue with physical therapy current plan of care.     Marlene Cherry PTA  License Number: PT

## 2018-07-10 NOTE — PLAN OF CARE
Problem: Nutrition  Goal: Optimal nutrition therapy  Outcome: Ongoing  Nutrition Problem: Inadequate oral intake  Nutritional Goals: Pt to consume >75% meals and ONS

## 2018-07-10 NOTE — CARE COORDINATION
Alert, pleasant. Pt states he is hoping to go home today. States he has been ambulating. Plan is now to discharge home with [facility].

## 2018-08-10 NOTE — DISCHARGE SUMMARY
images 123 Technique: Low-dose CT  acquisition technique included one of following options; 1 . Automated exposure control, 2. Adjustment of MA and or KV according to patient's size or 3. Use of iterative reconstruction. INDICATION:  follow up head  COMPARISON: Previous CT scan 6/16/2018 FINDINGS: The previously noted subarachnoid hemorrhage in the left temporal and frontal area is identified with  slight interval improvement. There may be  tiny amount of subdural hematoma also in the  region. There is no midline shift or mass effect. The ventricles are of normal size and configuration. The remainder of the brain parenchyma has normal architecture and attenuation. There is pansinusitis with soft tissue densities in the maxillary, ethmoid and frontal sinus. Small horizontally oriented fracture through the left temporal bone and the mastoid cells with a small amount of fluid in the mastoid air cells are noted. Persistent but slightly improved subarachnoid and small subdural hematoma in the left temporal /frontal region. Horizontal fracture through the left temporal bone and pansinusitis     Ct Head Wo Contrast    Result Date: 6/16/2018  EXAM:   CT Head Without Intravenous Contrast EXAM DATE/TIME:   6/16/2018 6:00 AM CLINICAL HISTORY:   56years old, male; Injury or trauma; Fall; Follow-up exam; Blunt trauma (contusions or hematomas); Consciousness not specified; Additional info: Trauma f/u TECHNIQUE:   Axial computed tomography images of the head/brain without intravenous contrast.  All CT scans at this facility use one or more dose reduction techniques, viz.: automated exposure control; ma/kV adjustment per patient size (including targeted exams where dose is matched to indication; i.e. head); or iterative reconstruction technique. Coronal and sagittal reformatted images were created and reviewed.  COMPARISON:   CT HEAD WO CONTRAST 2018-06-16 01:47 FINDINGS:   Brain:  Stable acute subarachnoid hemorrhage centered at the left temporal lobe. Suspect mild hemorrhagic contusion inferior right frontal lobe with mild edema. No significant white matter disease. Midline shift:  No midline shift. Ventricles:  Unremarkable. No ventriculomegaly. Bones/joints:  Nondisplaced left mastoid temporal bone fracture is again demonstrated, stable. Soft tissues:  Unremarkable. Sinuses:  Severe paranasal sinus disease. Mastoid air cells:  Partial opacification of the left mastoid air cells. Stable left temporal subarachnoid hemorrhage. Suspect mild hemorrhagic contusion of the inferior right frontal lobe with mild associated edema, new. Stable nondisplaced left mastoid temporal bone fracture. This report has been electronically signed by Pedro Trinidad MD.    Ct Head Wo Contrast    Addendum Date: 6/16/2018    EXAM: CT Head Without Intravenous Contrast CLINICAL HISTORY: 56years old, male; Injury or trauma; Initial encounter TECHNIQUE: Axial computed tomography images of the head/brain without intravenous contrast.  All CT scans at this facility use one or more dose reduction techniques, viz.: automated exposure control; ma/kV adjustment per patient size (including targeted exams where dose is matched to indication; i.e. head); or iterative reconstruction technique. Coronal and sagittal reformatted images were created and reviewed. COMPARISON: No relevant prior studies available. FINDINGS: Brain:  Small amount of subarachnoid hemorrhage within the left temporal sulci. Ventricles:  Normal. Bones/joints:  Normal.  No acute fracture. Soft tissues:  Mild left posterior soft tissue swelling/contusion. Sinuses:  Diffuse paranasal sinusitis. Mastoid air cells:  Normal as visualized. No mastoid effusion. IMPRESSION:     1. Small amount of subarachnoid hemorrhage within the left temporal sulci. 2.  Mild left posterior soft tissue swelling/contusion. 3.  Diffuse paranasal sinusitis.  ADDENDUM: Small amount of fluid within the left mastoid air cells as well as the left external auditory canal. On the coronal images, there is a minimally displaced, horizontally oriented fracture extending through the left mastoid air cells and into the left temporomandibular joint (series 4, images 22-25). Fracture extends posteriorly into the left posterior temporal calvarium, near the suture (series 4, images 35-40). Additionally, there is a single locule of gas within the left posterior fossa extra-axial region, possibly secondary to fracture. THIS REPORT CONTAINS FINDINGS THAT MAY BE CRITICAL TO PATIENT CARE. The findings were verbally communicated via telephone conference with Dr. Ramila Tamez at 2:41 AM EDT on 6/16/2018. The findings were acknowledged and understood. This addendum has been electronically signed by Dennise Waddell MD.    Result Date: 6/16/2018  EXAM: CT Head Without Intravenous Contrast CLINICAL HISTORY: 56years old, male; Injury or trauma; Initial encounter TECHNIQUE: Axial computed tomography images of the head/brain without intravenous contrast.  All CT scans at this facility use one or more dose reduction techniques, viz.: automated exposure control; ma/kV adjustment per patient size (including targeted exams where dose is matched to indication; i.e. head); or iterative reconstruction technique. Coronal and sagittal reformatted images were created and reviewed. COMPARISON: No relevant prior studies available. FINDINGS: Brain:  Small amount of subarachnoid hemorrhage within the left temporal sulci. Ventricles:  Normal. Bones/joints:  Normal.  No acute fracture. Soft tissues:  Mild left posterior soft tissue swelling/contusion. Sinuses:  Diffuse paranasal sinusitis. Mastoid air cells:  Normal as visualized. No mastoid effusion. 1.  Small amount of subarachnoid hemorrhage within the left temporal sulci. 2.  Mild left posterior soft tissue swelling/contusion. 3.  Diffuse paranasal sinusitis.  This report has been electronically signed by as: PRINIVIL;ZESTRIL     metFORMIN 1000 MG tablet  Commonly known as:  GLUCOPHAGE            Disposition: home    Patient Instructions: Activity: Per discharge instructions  Diet per Discharge instructions   Wound Care: Per discharge instructions   Follow-up with per discharge instructions         Signed:   Azeem Bates  8/10/2018  5:17 PM

## 2018-09-26 ENCOUNTER — HOSPITAL ENCOUNTER (OUTPATIENT)
Dept: NEUROLOGY | Age: 48
Discharge: HOME | End: 2018-09-26
Payer: COMMERCIAL

## 2018-09-26 PROCEDURE — 95953 HC EEG CONT EA 24HR 16CH UNATTENDED: CPT

## 2018-09-27 ENCOUNTER — HOSPITAL ENCOUNTER (OUTPATIENT)
Dept: NEUROLOGY | Age: 48
Discharge: HOME | End: 2018-09-27
Payer: COMMERCIAL

## 2018-09-27 PROCEDURE — 95950 PR AMBULATORY EEG MONITORING: CPT | Performed by: PSYCHIATRY & NEUROLOGY

## 2018-10-17 NOTE — PROCEDURES
Ambulatory EEG    This 72-year-old man on Keppra, Glucophage, Prinivil, insulin and Invokana, displayed the following underlying rhythms---well-organized, symmetrical, 12 Hz, 30-50 µV, alpha rhythms in both posterior regions. 18 Hz, 10 µV beta rhythms were noted in both precentral regions. There was symmetrical attenuation of the posterior alpha rhythms with eye opening. The patient did fall asleep, progressing uneventfully through all stages of somnolence. Despite moderate Jimmie and eye movement artifacts, there were no focal abnormalities or epileptiform discharges. Impression---normal 24-hour ambulatory EEG    Clinical correlation was highly advised.

## 2020-08-20 NOTE — PLAN OF CARE
Problem: Risk for Impaired Skin Integrity  Goal: Tissue integrity - skin and mucous membranes  Structural intactness and normal physiological function of skin and  mucous membranes.    Outcome: Met This Shift      Problem: Restraint Use - Nonviolent/Non-Self-Destructive Behavior:  Goal: Absence of restraint indications  Absence of restraint indications   Outcome: Not Met This Shift    Goal: Absence of restraint-related injury  Absence of restraint-related injury   Outcome: Met This Shift      Problem: Confusion - Acute:  Goal: Absence of continued neurological deterioration signs and symptoms  Absence of continued neurological deterioration signs and symptoms   Outcome: Not Met This Shift    Goal: Mental status will be restored to baseline  Mental status will be restored to baseline   Outcome: Not Met This Shift detailed exam

## 2025-02-05 NOTE — PLAN OF CARE
I called Antonia to get her fax number to fax the AVS from today and to also speak with her about a new TLSO brace for Tani.  She was given my direct phone number to call.   Problem: Restraint Use - Nonviolent/Non-Self-Destructive Behavior:  Goal: Absence of restraint indications  Absence of restraint indications   Outcome: Not Met This Shift    Goal: Absence of restraint-related injury  Absence of restraint-related injury   Outcome: Met This Shift